# Patient Record
Sex: FEMALE | Race: WHITE | Employment: OTHER | URBAN - NONMETROPOLITAN AREA
[De-identification: names, ages, dates, MRNs, and addresses within clinical notes are randomized per-mention and may not be internally consistent; named-entity substitution may affect disease eponyms.]

---

## 2019-11-08 ENCOUNTER — HOSPITAL ENCOUNTER (OUTPATIENT)
Age: 79
Setting detail: SPECIMEN
Discharge: HOME | End: 2019-11-08
Payer: MEDICARE

## 2019-11-08 PROCEDURE — 87449 NOS EACH ORGANISM AG IA: CPT

## 2019-11-08 PROCEDURE — 87506 IADNA-DNA/RNA PROBE TQ 6-11: CPT

## 2019-11-08 PROCEDURE — 87324 CLOSTRIDIUM AG IA: CPT

## 2019-11-09 LAB
C DIFF AG + TOXIN: NEGATIVE
CAMPYLOBACTER PCR: NORMAL
E COLI ENTEROTOXIGENIC PCR: NORMAL
PLESIOMONAS SHIGELLOIDES PCR: NORMAL
SALMONELLA PCR: NORMAL
SHIGATOXIN GENE PCR: NORMAL
SHIGELLA SP PCR: NORMAL
SPECIMEN DESCRIPTION: NORMAL
SPECIMEN DESCRIPTION: NORMAL
VIBRIO PCR: NORMAL
YERSINIA ENTEROCOLITICA PCR: NORMAL

## 2019-11-12 ENCOUNTER — HOSPITAL ENCOUNTER (OUTPATIENT)
Age: 79
Setting detail: SPECIMEN
Discharge: HOME | End: 2019-11-12
Payer: MEDICARE

## 2019-11-12 LAB
ALBUMIN SERPL-MCNC: 3.3 G/DL (ref 3.5–5.2)
ALBUMIN/GLOBULIN RATIO: 0.8 (ref 1–2.5)
ALP BLD-CCNC: 109 U/L (ref 35–104)
ALT SERPL-CCNC: 26 U/L (ref 5–33)
ANION GAP SERPL CALCULATED.3IONS-SCNC: 14 MMOL/L (ref 9–17)
AST SERPL-CCNC: 13 U/L
BILIRUB SERPL-MCNC: 0.46 MG/DL (ref 0.3–1.2)
BUN BLDV-MCNC: 25 MG/DL (ref 8–23)
BUN/CREAT BLD: 36 (ref 9–20)
CALCIUM SERPL-MCNC: 9.6 MG/DL (ref 8.6–10.4)
CHLORIDE BLD-SCNC: 96 MMOL/L (ref 98–107)
CO2: 27 MMOL/L (ref 20–31)
CREAT SERPL-MCNC: 0.7 MG/DL (ref 0.5–0.9)
GFR AFRICAN AMERICAN: >60 ML/MIN
GFR NON-AFRICAN AMERICAN: >60 ML/MIN
GFR SERPL CREATININE-BSD FRML MDRD: ABNORMAL ML/MIN/{1.73_M2}
GFR SERPL CREATININE-BSD FRML MDRD: ABNORMAL ML/MIN/{1.73_M2}
GLUCOSE BLD-MCNC: 182 MG/DL (ref 70–99)
HCT VFR BLD CALC: 37.2 % (ref 36.3–47.1)
HEMOGLOBIN: 12 G/DL (ref 11.9–15.1)
MCH RBC QN AUTO: 29.1 PG (ref 25.2–33.5)
MCHC RBC AUTO-ENTMCNC: 32.3 G/DL (ref 28.4–34.8)
MCV RBC AUTO: 90.1 FL (ref 82.6–102.9)
NRBC AUTOMATED: 0 PER 100 WBC
PDW BLD-RTO: 13.2 % (ref 11.8–14.4)
PLATELET # BLD: 500 K/UL (ref 138–453)
PMV BLD AUTO: 10.3 FL (ref 8.1–13.5)
POTASSIUM SERPL-SCNC: 4.4 MMOL/L (ref 3.7–5.3)
RBC # BLD: 4.13 M/UL (ref 3.95–5.11)
SODIUM BLD-SCNC: 137 MMOL/L (ref 135–144)
TOTAL PROTEIN: 7.3 G/DL (ref 6.4–8.3)
URIC ACID: 5.5 MG/DL (ref 2.4–5.7)
WBC # BLD: 6.9 K/UL (ref 3.5–11.3)

## 2019-11-12 PROCEDURE — 80053 COMPREHEN METABOLIC PANEL: CPT

## 2019-11-12 PROCEDURE — 36415 COLL VENOUS BLD VENIPUNCTURE: CPT

## 2019-11-12 PROCEDURE — 85027 COMPLETE CBC AUTOMATED: CPT

## 2019-11-12 PROCEDURE — 84550 ASSAY OF BLOOD/URIC ACID: CPT

## 2019-11-12 PROCEDURE — P9604 ONE-WAY ALLOW PRORATED TRIP: HCPCS

## 2020-12-09 PROBLEM — M19.90 OSTEOARTHRITIS: Chronic | Status: ACTIVE | Noted: 2020-12-09

## 2020-12-09 PROBLEM — Z85.828 PERSONAL HISTORY OF OTHER MALIGNANT NEOPLASM OF SKIN: Status: ACTIVE | Noted: 2019-10-24

## 2020-12-09 PROBLEM — I10 ESSENTIAL HYPERTENSION: Status: ACTIVE | Noted: 2020-12-09

## 2020-12-09 PROBLEM — K52.9 CHRONIC DIARRHEA: Chronic | Status: ACTIVE | Noted: 2020-12-09

## 2021-07-12 ENCOUNTER — APPOINTMENT (OUTPATIENT)
Dept: CT IMAGING | Age: 81
DRG: 065 | End: 2021-07-12
Payer: MEDICARE

## 2021-07-12 ENCOUNTER — HOSPITAL ENCOUNTER (EMERGENCY)
Age: 81
Discharge: ANOTHER HOSPITAL | End: 2021-07-12
Attending: EMERGENCY MEDICINE | Admitting: EMERGENCY MEDICINE
Payer: MEDICARE

## 2021-07-12 ENCOUNTER — APPOINTMENT (OUTPATIENT)
Dept: GENERAL RADIOLOGY | Age: 81
End: 2021-07-12
Payer: MEDICARE

## 2021-07-12 ENCOUNTER — HOSPITAL ENCOUNTER (INPATIENT)
Age: 81
LOS: 14 days | Discharge: NURSING, REHAB OR LONG-TERM CARE | DRG: 065 | End: 2021-07-26
Attending: EMERGENCY MEDICINE | Admitting: PSYCHIATRY & NEUROLOGY
Payer: MEDICARE

## 2021-07-12 ENCOUNTER — APPOINTMENT (OUTPATIENT)
Dept: CT IMAGING | Age: 81
End: 2021-07-12
Payer: MEDICARE

## 2021-07-12 VITALS
DIASTOLIC BLOOD PRESSURE: 97 MMHG | HEART RATE: 88 BPM | RESPIRATION RATE: 21 BRPM | SYSTOLIC BLOOD PRESSURE: 183 MMHG | OXYGEN SATURATION: 90 %

## 2021-07-12 DIAGNOSIS — I62.9 INTRACRANIAL HEMORRHAGE (HCC): Primary | ICD-10-CM

## 2021-07-12 DIAGNOSIS — I16.9 HYPERTENSIVE CRISIS: ICD-10-CM

## 2021-07-12 DIAGNOSIS — I61.0 BASAL GANGLIA HEMORRHAGE (HCC): ICD-10-CM

## 2021-07-12 DIAGNOSIS — I61.9 INTRAPARENCHYMAL HEMORRHAGE OF BRAIN (HCC): Primary | ICD-10-CM

## 2021-07-12 PROBLEM — I63.9 ACUTE CEREBROVASCULAR ACCIDENT (CVA) (HCC): Status: ACTIVE | Noted: 2021-07-12

## 2021-07-12 LAB
ABSOLUTE EOS #: 0.11 K/UL (ref 0–0.44)
ABSOLUTE IMMATURE GRANULOCYTE: 0.03 K/UL (ref 0–0.3)
ABSOLUTE LYMPH #: 3.83 K/UL (ref 1.1–3.7)
ABSOLUTE MONO #: 0.69 K/UL (ref 0.1–1.2)
ALBUMIN SERPL-MCNC: 4.4 G/DL (ref 3.5–5.2)
ALBUMIN/GLOBULIN RATIO: 1.5 (ref 1–2.5)
ALP BLD-CCNC: 86 U/L (ref 35–104)
ALT SERPL-CCNC: 35 U/L (ref 5–33)
ANION GAP SERPL CALCULATED.3IONS-SCNC: 15 MMOL/L (ref 9–17)
AST SERPL-CCNC: 27 U/L
BASOPHILS # BLD: 1 % (ref 0–2)
BASOPHILS ABSOLUTE: 0.05 K/UL (ref 0–0.2)
BILIRUB SERPL-MCNC: 0.34 MG/DL (ref 0.3–1.2)
BUN BLDV-MCNC: 19 MG/DL (ref 8–23)
BUN/CREAT BLD: 36 (ref 9–20)
CALCIUM SERPL-MCNC: 9.5 MG/DL (ref 8.6–10.4)
CHLORIDE BLD-SCNC: 99 MMOL/L (ref 98–107)
CO2: 23 MMOL/L (ref 20–31)
CREAT SERPL-MCNC: 0.53 MG/DL (ref 0.5–0.9)
DIFFERENTIAL TYPE: ABNORMAL
EKG ATRIAL RATE: 84 BPM
EKG P AXIS: 57 DEGREES
EKG P-R INTERVAL: 200 MS
EKG Q-T INTERVAL: 420 MS
EKG QRS DURATION: 122 MS
EKG QTC CALCULATION (BAZETT): 496 MS
EKG R AXIS: -47 DEGREES
EKG T AXIS: 64 DEGREES
EKG VENTRICULAR RATE: 84 BPM
EOSINOPHILS RELATIVE PERCENT: 1 % (ref 1–4)
GFR AFRICAN AMERICAN: >60 ML/MIN
GFR NON-AFRICAN AMERICAN: >60 ML/MIN
GFR SERPL CREATININE-BSD FRML MDRD: ABNORMAL ML/MIN/{1.73_M2}
GFR SERPL CREATININE-BSD FRML MDRD: ABNORMAL ML/MIN/{1.73_M2}
GLUCOSE BLD-MCNC: 126 MG/DL (ref 70–99)
HCT VFR BLD CALC: 42.1 % (ref 36.3–47.1)
HEMOGLOBIN: 14.2 G/DL (ref 11.9–15.1)
IMMATURE GRANULOCYTES: 0 %
INR BLD: 1
INR BLD: 1
LYMPHOCYTES # BLD: 41 % (ref 24–43)
MAGNESIUM: 1.9 MG/DL (ref 1.6–2.6)
MCH RBC QN AUTO: 30.7 PG (ref 25.2–33.5)
MCHC RBC AUTO-ENTMCNC: 33.7 G/DL (ref 28.4–34.8)
MCV RBC AUTO: 91.1 FL (ref 82.6–102.9)
MONOCYTES # BLD: 7 % (ref 3–12)
NRBC AUTOMATED: 0 PER 100 WBC
PARTIAL THROMBOPLASTIN TIME: 21.2 SEC (ref 20.5–30.5)
PARTIAL THROMBOPLASTIN TIME: 24.3 SEC (ref 23.9–33.8)
PDW BLD-RTO: 13 % (ref 11.8–14.4)
PLATELET # BLD: 255 K/UL (ref 138–453)
PLATELET ESTIMATE: ABNORMAL
PMV BLD AUTO: 10.3 FL (ref 8.1–13.5)
POTASSIUM SERPL-SCNC: 3.3 MMOL/L (ref 3.7–5.3)
PROTHROMBIN TIME: 10.8 SEC (ref 9.1–12.3)
PROTHROMBIN TIME: 13 SEC (ref 11.5–14.2)
RBC # BLD: 4.62 M/UL (ref 3.95–5.11)
RBC # BLD: ABNORMAL 10*6/UL
SARS-COV-2, RAPID: NOT DETECTED
SEG NEUTROPHILS: 50 % (ref 36–65)
SEGMENTED NEUTROPHILS ABSOLUTE COUNT: 4.75 K/UL (ref 1.5–8.1)
SODIUM BLD-SCNC: 137 MMOL/L (ref 135–144)
SPECIMEN DESCRIPTION: NORMAL
TOTAL PROTEIN: 7.4 G/DL (ref 6.4–8.3)
TROPONIN INTERP: NORMAL
TROPONIN T: NORMAL NG/ML
TROPONIN, HIGH SENSITIVITY: 11 NG/L (ref 0–14)
TROPONIN, HIGH SENSITIVITY: 7 NG/L (ref 0–14)
TROPONIN, HIGH SENSITIVITY: 8 NG/L (ref 0–14)
WBC # BLD: 9.5 K/UL (ref 3.5–11.3)
WBC # BLD: ABNORMAL 10*3/UL

## 2021-07-12 PROCEDURE — 85730 THROMBOPLASTIN TIME PARTIAL: CPT

## 2021-07-12 PROCEDURE — 2500000003 HC RX 250 WO HCPCS: Performed by: STUDENT IN AN ORGANIZED HEALTH CARE EDUCATION/TRAINING PROGRAM

## 2021-07-12 PROCEDURE — 70496 CT ANGIOGRAPHY HEAD: CPT

## 2021-07-12 PROCEDURE — 93005 ELECTROCARDIOGRAM TRACING: CPT | Performed by: EMERGENCY MEDICINE

## 2021-07-12 PROCEDURE — 70450 CT HEAD/BRAIN W/O DYE: CPT

## 2021-07-12 PROCEDURE — 96375 TX/PRO/DX INJ NEW DRUG ADDON: CPT

## 2021-07-12 PROCEDURE — 6370000000 HC RX 637 (ALT 250 FOR IP): Performed by: PSYCHIATRY & NEUROLOGY

## 2021-07-12 PROCEDURE — 6360000004 HC RX CONTRAST MEDICATION: Performed by: EMERGENCY MEDICINE

## 2021-07-12 PROCEDURE — 85025 COMPLETE CBC W/AUTO DIFF WBC: CPT

## 2021-07-12 PROCEDURE — 93005 ELECTROCARDIOGRAM TRACING: CPT | Performed by: STUDENT IN AN ORGANIZED HEALTH CARE EDUCATION/TRAINING PROGRAM

## 2021-07-12 PROCEDURE — 84484 ASSAY OF TROPONIN QUANT: CPT

## 2021-07-12 PROCEDURE — 87635 SARS-COV-2 COVID-19 AMP PRB: CPT

## 2021-07-12 PROCEDURE — 2580000003 HC RX 258: Performed by: EMERGENCY MEDICINE

## 2021-07-12 PROCEDURE — 2000000003 HC NEURO ICU R&B

## 2021-07-12 PROCEDURE — 96374 THER/PROPH/DIAG INJ IV PUSH: CPT

## 2021-07-12 PROCEDURE — 2500000003 HC RX 250 WO HCPCS: Performed by: EMERGENCY MEDICINE

## 2021-07-12 PROCEDURE — 6360000002 HC RX W HCPCS: Performed by: STUDENT IN AN ORGANIZED HEALTH CARE EDUCATION/TRAINING PROGRAM

## 2021-07-12 PROCEDURE — 99283 EMERGENCY DEPT VISIT LOW MDM: CPT

## 2021-07-12 PROCEDURE — 83735 ASSAY OF MAGNESIUM: CPT

## 2021-07-12 PROCEDURE — 85610 PROTHROMBIN TIME: CPT

## 2021-07-12 PROCEDURE — 71045 X-RAY EXAM CHEST 1 VIEW: CPT

## 2021-07-12 PROCEDURE — 36415 COLL VENOUS BLD VENIPUNCTURE: CPT

## 2021-07-12 PROCEDURE — 51702 INSERT TEMP BLADDER CATH: CPT

## 2021-07-12 PROCEDURE — 93010 ELECTROCARDIOGRAM REPORT: CPT | Performed by: INTERNAL MEDICINE

## 2021-07-12 PROCEDURE — 6360000002 HC RX W HCPCS: Performed by: EMERGENCY MEDICINE

## 2021-07-12 PROCEDURE — 6360000002 HC RX W HCPCS: Performed by: PSYCHIATRY & NEUROLOGY

## 2021-07-12 PROCEDURE — 80053 COMPREHEN METABOLIC PANEL: CPT

## 2021-07-12 PROCEDURE — 99223 1ST HOSP IP/OBS HIGH 75: CPT | Performed by: PSYCHIATRY & NEUROLOGY

## 2021-07-12 PROCEDURE — 2580000003 HC RX 258: Performed by: PSYCHIATRY & NEUROLOGY

## 2021-07-12 RX ORDER — NICARDIPINE HYDROCHLORIDE 0.1 MG/ML
3-15 INJECTION INTRAVENOUS CONTINUOUS
Status: DISCONTINUED | OUTPATIENT
Start: 2021-07-12 | End: 2021-07-14

## 2021-07-12 RX ORDER — FENTANYL CITRATE 50 UG/ML
25 INJECTION, SOLUTION INTRAMUSCULAR; INTRAVENOUS
Status: DISCONTINUED | OUTPATIENT
Start: 2021-07-12 | End: 2021-07-13

## 2021-07-12 RX ORDER — SODIUM CHLORIDE 9 MG/ML
25 INJECTION, SOLUTION INTRAVENOUS PRN
Status: DISCONTINUED | OUTPATIENT
Start: 2021-07-12 | End: 2021-07-26 | Stop reason: HOSPADM

## 2021-07-12 RX ORDER — ATORVASTATIN CALCIUM 80 MG/1
80 TABLET, FILM COATED ORAL NIGHTLY
Status: DISCONTINUED | OUTPATIENT
Start: 2021-07-12 | End: 2021-07-13

## 2021-07-12 RX ORDER — ONDANSETRON 2 MG/ML
4 INJECTION INTRAMUSCULAR; INTRAVENOUS ONCE
Status: COMPLETED | OUTPATIENT
Start: 2021-07-12 | End: 2021-07-12

## 2021-07-12 RX ORDER — ONDANSETRON 4 MG/1
4 TABLET, ORALLY DISINTEGRATING ORAL EVERY 8 HOURS PRN
Status: DISCONTINUED | OUTPATIENT
Start: 2021-07-12 | End: 2021-07-26 | Stop reason: HOSPADM

## 2021-07-12 RX ORDER — FENTANYL CITRATE 50 UG/ML
50 INJECTION, SOLUTION INTRAMUSCULAR; INTRAVENOUS
Status: DISCONTINUED | OUTPATIENT
Start: 2021-07-12 | End: 2021-07-13

## 2021-07-12 RX ORDER — ONDANSETRON 2 MG/ML
4 INJECTION INTRAMUSCULAR; INTRAVENOUS EVERY 6 HOURS PRN
Status: DISCONTINUED | OUTPATIENT
Start: 2021-07-12 | End: 2021-07-26 | Stop reason: HOSPADM

## 2021-07-12 RX ORDER — SODIUM CHLORIDE 0.9 % (FLUSH) 0.9 %
5-40 SYRINGE (ML) INJECTION EVERY 12 HOURS SCHEDULED
Status: DISCONTINUED | OUTPATIENT
Start: 2021-07-12 | End: 2021-07-26 | Stop reason: HOSPADM

## 2021-07-12 RX ORDER — SODIUM CHLORIDE 0.9 % (FLUSH) 0.9 %
5-40 SYRINGE (ML) INJECTION PRN
Status: DISCONTINUED | OUTPATIENT
Start: 2021-07-12 | End: 2021-07-26 | Stop reason: HOSPADM

## 2021-07-12 RX ORDER — LEVETIRACETAM 10 MG/ML
1000 INJECTION INTRAVASCULAR ONCE
Status: COMPLETED | OUTPATIENT
Start: 2021-07-12 | End: 2021-07-12

## 2021-07-12 RX ORDER — LEVETIRACETAM 5 MG/ML
500 INJECTION INTRAVASCULAR EVERY 12 HOURS
Status: DISCONTINUED | OUTPATIENT
Start: 2021-07-13 | End: 2021-07-13

## 2021-07-12 RX ORDER — ACETAMINOPHEN 325 MG/1
650 TABLET ORAL EVERY 4 HOURS PRN
Status: DISCONTINUED | OUTPATIENT
Start: 2021-07-12 | End: 2021-07-26 | Stop reason: HOSPADM

## 2021-07-12 RX ADMIN — ONDANSETRON 4 MG: 2 INJECTION INTRAMUSCULAR; INTRAVENOUS at 17:58

## 2021-07-12 RX ADMIN — FENTANYL CITRATE 25 MCG: 50 INJECTION, SOLUTION INTRAMUSCULAR; INTRAVENOUS at 22:18

## 2021-07-12 RX ADMIN — LEVETIRACETAM 1000 MG: 10 INJECTION INTRAVENOUS at 21:56

## 2021-07-12 RX ADMIN — DEXTROSE MONOHYDRATE 3 MG/HR: 50 INJECTION, SOLUTION INTRAVENOUS at 18:00

## 2021-07-12 RX ADMIN — IOPAMIDOL 90 ML: 755 INJECTION, SOLUTION INTRAVENOUS at 19:44

## 2021-07-12 RX ADMIN — ATORVASTATIN CALCIUM 80 MG: 80 TABLET, FILM COATED ORAL at 21:56

## 2021-07-12 RX ADMIN — ONDANSETRON 4 MG: 2 INJECTION INTRAMUSCULAR; INTRAVENOUS at 21:56

## 2021-07-12 RX ADMIN — NICARDIPINE HYDROCHLORIDE 5 MG/HR: 0.1 INJECTION INTRAVENOUS at 20:06

## 2021-07-12 RX ADMIN — SODIUM CHLORIDE, PRESERVATIVE FREE 10 ML: 5 INJECTION INTRAVENOUS at 21:56

## 2021-07-12 ASSESSMENT — ENCOUNTER SYMPTOMS
ABDOMINAL PAIN: 0
NAUSEA: 0
COUGH: 0
BACK PAIN: 0
VOMITING: 0
SHORTNESS OF BREATH: 0
COLOR CHANGE: 0

## 2021-07-12 ASSESSMENT — PAIN SCALES - GENERAL
PAINLEVEL_OUTOF10: 8

## 2021-07-12 ASSESSMENT — PAIN DESCRIPTION - LOCATION: LOCATION: HEAD;NECK

## 2021-07-12 ASSESSMENT — PAIN DESCRIPTION - FREQUENCY: FREQUENCY: INTERMITTENT

## 2021-07-12 ASSESSMENT — PAIN DESCRIPTION - ORIENTATION: ORIENTATION: POSTERIOR

## 2021-07-12 ASSESSMENT — PAIN DESCRIPTION - PAIN TYPE: TYPE: ACUTE PAIN

## 2021-07-12 ASSESSMENT — PAIN DESCRIPTION - DESCRIPTORS: DESCRIPTORS: DISCOMFORT;THROBBING

## 2021-07-12 NOTE — ED PROVIDER NOTES
with complaint of headache. At 1530 was noted to have change in status with left-sided weakness. CT was performed at outlying facility which showed right basal ganglia bleed with ventricular extension. Patient was transferred here for further evaluation by stroke team.  Nikki Dietz started in route. Patient denies any blood thinner use except for baby aspirin. Physical:     blood pressure is 174/100 (abnormal) and her pulse is 92. Her respiration is 22 and oxygen saturation is 95%.    78 y.o. female no acute distress but appears uncomfortable. Diminished strength on the left side. Answering questions appropriately. Impression: Intracranial hemorrhage    Plan: CT head CTA head, stroke alert, admit to neuro ICU    EKG Interpretation    Interpreted by emergency department physician    Rhythm: normal sinus   Rate: normal  Axis: normal  Ectopy: Fusion complexes  Conduction: left anterior fasciclar block and left ventricular hypertrophy with nonspecific interventricular conduction delay  ST Segments: no acute change  T Waves: no acute change  Q Waves: none    EKG  Impression: Abnormal EKG, nonspecific    Virginia Doherty MD          CRITICAL CARE: There was a high probability of clinically significant/life threatening deterioration in this patient's condition which required my urgent intervention. Total critical care time was 31 minutes. This excludes any time for separately reportable procedures.      Rafiq Adler MD, Dilan Cook  Attending Emergency Physician        Virginia Doherty MD  07/12/21 1943       Virginia Doherty MD  07/12/21 2032

## 2021-07-12 NOTE — CODE DOCUMENTATION
Patient arrived to ED as a transfer from [facility] for facial droop and left sided weakness. Last known wellness is 1545. Patient shows slight Left sided droopiness and weakness on left side. Patient wears corrective lenses, but right eye opens more than left eye. Patient is on no blood thinners. Patient AOx3 and has C/O headache and nausea. Patient has history of CVA in 2014 and afib. Patient displays hypertension. Stroke alert called at [phone] [address]

## 2021-07-12 NOTE — ED NOTES
Bed: 02  Expected date:   Expected time:   Means of arrival:   Comments:  Stroke alert       Edris Severs, RN  07/12/21 8987

## 2021-07-12 NOTE — ED NOTES
To CT per cart with RN present, Dr Michael Stewart assessed pt at bedside      Carolina Aguiar, LEÓN  07/12/21 9615

## 2021-07-12 NOTE — ED PROVIDER NOTES
I did not see, evaluate, or staff this patient. Patient seen by Dr. Rogelio Medrano.        Aniceto Ledezma MD  07/12/21 Michael Garrison

## 2021-07-12 NOTE — ED NOTES
Pt had moderate sized yellow emesis on the way back from CT, Dr Muñoz Led notified     Jenna Oliveira, RN  07/12/21 3674

## 2021-07-12 NOTE — ED PROVIDER NOTES
[facility] ED  EMERGENCY DEPARTMENT ENCOUNTER      Pt Name: Duncan Wadsworth  MRN: [id number]  [place] 1940  Date of evaluation: 7/12/2021  Provider: Aparna Miller MD    CHIEF COMPLAINT       Chief Complaint   Patient presents with    Extremity Weakness     left sided, facial droop present, has not been taking BP meds for last several days    Nausea         HISTORY OF PRESENT ILLNESS   (Location/Symptom, Timing/Onset, Context/Setting, Quality, Duration, Modifying Factors, Severity)  Note limiting factors. Duncan Wadsworth is a 78 y.o. female who presents to the emergency department     78year-old patient presents emergency department with rather abrupt onset of weakness and inability to move her left arm left leg occurring 30 minutes prior to ED arrival at approximately 5 PM.  Patient also admits to mild headache. There was no loss conscious.-The patient does have previous history for CVA with previous history for left hemiparesis upper and lower extremities of which she is regained her function per EMS in attendance. Patient has a history for hypertension. Patient also admittedly has stopped taking her antihypertensive medications to 3 days prior to ED arrival does not really admit to any change in her visual acuity. Nursing Notes were reviewed. REVIEW OF SYSTEMS    (2-9 systems for level 4, 10 or more for level 5)     Review of Systems   Unable to perform ROS: Acuity of condition       Except as noted above the remainder of the review of systems was reviewed and negative.        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 ASPIRIN BUF,CACARB-MGCARB-MGO, (BUFFERIN PO)    Take 2 tablets by mouth 4 times daily as needed Per pt    ASPIRIN-ACETAMINOPHEN-CAFFEINE (EXCEDRIN MIGRAINE) 250-250-65 MG PER TABLET    Take 2 tablets by mouth daily as needed for Headaches    CALCIUM-MAGNESIUM-VITAMIN D PO        CHOLESTYRAMINE (QUESTRAN) 4 GM/DOSE POWDER    TAKE ONE SCOOP DISSOLVED IN A GLASS OF WATER TWICE A DAY AS NEEDED. FERROUS SULFATE 324 (65 FE) MG EC TABLET    Take 324 mg by mouth daily (with breakfast)    FLAXSEED, LINSEED, (FLAXSEED OIL PO)        LOSARTAN-HYDROCHLOROTHIAZIDE (HYZAAR) 100-25 MG PER TABLET    TAKE ONE TABLET DAILY. MAGNESIUM OXIDE 250 MG TABS    Take 1 tablet by mouth daily    MELATONIN 10 MG TABS    Take by mouth nightly as needed    NONFORMULARY    Indications: Natural supplement to help with elevated BP STRICTION BP - 2 TABLETS 2 TIMES A DAY    OMEGA-3 FATTY ACIDS (FISH OIL PO)        POTASSIUM GLUCONATE 595 (99 K) MG TABS    Take by mouth daily    UNABLE TO FIND    CBD Oil per pt report       ALLERGIES     Clonidine derivatives, Morphine, and Sulfa antibiotics    FAMILY HISTORY       Family History   Problem Relation Age of Onset    Diabetes Mother     Stroke Father     Heart Disease Father           SOCIAL HISTORY       Social History     Socioeconomic History    Marital status:       Spouse name: Not on file    Number of children: Not on file    Years of education: Not on file    Highest education level: Not on file   Occupational History    Not on file   Tobacco Use    Smoking status: Never Smoker    Smokeless tobacco: Never Used   Substance and Sexual Activity    Alcohol use: Yes    Drug use: Never    Sexual activity: Not Currently   Other Topics Concern    Not on file   Social History Narrative    Not on file     Social Determinants of Health     Financial Resource Strain:     Difficulty of Paying Living Expenses:    Food Insecurity:     Worried About Running Out of Food in the Last Year:     [address] in the Last Year:    Transportation Needs:     Lack of Transportation (Medical):  Lack of Transportation (Non-Medical):    Physical Activity:     Days of Exercise per Week:     Minutes of Exercise per Session:    Stress:     Feeling of Stress :    Social Connections:     Frequency of Communication with Friends and Family:     Frequency of Social Gatherings with Friends and Family:     Attends [employer] Services:     Active Member of Clubs or Organizations:     Attends Club or Organization Meetings:     Marital Status:    Intimate Partner Violence:     Fear of Current or Ex-Partner:     Emotionally Abused:     Physically Abused:     Sexually Abused:        SCREENINGS   NIH Stroke Scale  NIH Stroke Scale Assessed: Yes  Interval: Baseline  Level of Consciousness (1a. ): Not alert, requires repeated/painful stimulation or obtunded  LOC Questions (1b. ):  Answers both correctly  LOC Commands (1c. ): Performs both tasks correctly  Best Gaze (2. ): Normal  Visual (3. ): No visual loss  Facial Palsy (4. ): (!) Complete paralysis of one or both sides  Motor Arm, Left (5a. ): No movement  Motor Arm, Right (5b. ): No drift  Motor Leg, Left (6a. ): No movement  Motor Leg, Right (6b. ): No drift  Limb Ataxia (7. ): Absent  Sensory (8. ): Normal  Best Language (9. ): No aphasia  Dysarthria (10. ): Normal  Extinction and Inattention (11): No abnormality  Total: 12                    PHYSICAL EXAM    (up to 7 for level 4, 8 patient remains responsive to verbal stimuli during emergency department course-patient's blood pressure did improve after IV Cardene has been initiated and as documented per nursing    ED Course as of Jul 13 1300   Mon Jul 12, 2021   1801 Performed at 1759-the ventricular rate 84-sinus rhythm with premature ventricular complexes, CT interval 0.200-QRS duration 0.122-QTc corrected 0.496 nonspecific ST changes noted in 1 and aVL   EKG 12 Lead [RS]   Tue Jul 13, 2021   1255 Comprehensive Metabolic Panel w/ Reflex to MG(!):    Glucose 126(!)   BUN 19   Creatinine 0.53   Bun/Cre Ratio 36(!)   Calcium 9.5   Sodium 137   Potassium 3.3(!)   Chloride 99   CO2 23   Anion Gap 15   Alk Phos 86   ALT 35(!)   AST 27   Bilirubin 0.34   Total Protein 7.4   Albumin 4.4   Albumin/Globulin Ratio 1.5   GFR Non-African American >60   GFR  >60   GFR Comment        GFR Staging      [RS]   1255 CBC Auto Differential(!):    WBC 9.5   RBC 4.62   Hemoglobin Quant 14.2   Hematocrit 42.1   MCV 91.1   MCH 30.7   MCHC 33.7   RDW 13.0   Platelet Count 295   MPV 10.3   NRBC Automated 0.0   Differential Type NOT REPORTED   Seg Neutrophils 50   Lymphocytes 41   Monocytes 7   Eosinophils % 1   Basophils 1   Immature Granulocytes 0   Segs Absolute 4.75   Absolute Lymph # 3.83(!)   Absolute Mono # 0.69   Absolute Eos # 0.11   Basophils Absolute 0.05   Absolute Immature Granulocyte 0.03   WBC Morphology NOT REPORTED   . .. [RS]   1255 Magnesium:    Magnesium 1.9 [RS]   1255 Protime-INR:    Prothrombin Time 13.0   INR 1.0 [RS]   1255 Chest x-ray no acute process radiologist read   XR CHEST PORTABLE [RS]   0080 CT the patient's brain intraparenchymal\basal ganglion hemorrhage with ventricular involvement   CT Head WO Contrast [RS]      ED Course User Index  [RS] Sami Briones MD         CRITICAL CARE TIME   Total Critical Care time was 35 minutes, excluding separately reportable procedures.   There was a high probability of clinically significant/life threatening deterioration in the patient's condition which required my urgent intervention. CONSULTS:  Consultation obtained with emergency department physician Dr. Josette Nevarez. [facility] emergency department physician will except the patient in transfer    PROCEDURES:  Unless otherwise noted below, none     Procedures    FINAL IMPRESSION      1. Intraparenchymal hemorrhage of brain (HCC)    2. Basal ganglia hemorrhage ([facility] [address]    3. Hypertensive crisis          DISPOSITION/PLAN   DISPOSITION Decision To Transfer 07/12/2021 05:44:56 PM      PATIENT REFERRED TO:  No follow-up provider specified. DISCHARGE MEDICATIONS:  New Prescriptions    No medications on file     Controlled Substances Monitoring:     No flowsheet data found.     (Please note that portions of this note were completed with a voice recognition program.  Efforts were made to edit the dictations but occasionally words are mis-transcribed.)    Tracee Singh MD (electronically signed)  Attending Emergency Physician            Tracee Singh MD  07/13/21 1300

## 2021-07-12 NOTE — ED NOTES
Spoke with Patient's Son Lindsay Lundy.  He will leave and head towards [facility] and meet her up there     Nelsy Ramirez  07/12/21 5583

## 2021-07-13 ENCOUNTER — APPOINTMENT (OUTPATIENT)
Dept: CT IMAGING | Age: 81
DRG: 065 | End: 2021-07-13
Payer: MEDICARE

## 2021-07-13 ENCOUNTER — APPOINTMENT (OUTPATIENT)
Dept: GENERAL RADIOLOGY | Age: 81
DRG: 065 | End: 2021-07-13
Payer: MEDICARE

## 2021-07-13 PROBLEM — I61.9 THALAMIC HEMORRHAGE WITH STROKE (HCC): Status: ACTIVE | Noted: 2021-07-12

## 2021-07-13 LAB
ANION GAP SERPL CALCULATED.3IONS-SCNC: 14 MMOL/L (ref 9–17)
BUN BLDV-MCNC: 14 MG/DL (ref 8–23)
BUN/CREAT BLD: ABNORMAL (ref 9–20)
CALCIUM SERPL-MCNC: 8.8 MG/DL (ref 8.6–10.4)
CHLORIDE BLD-SCNC: 100 MMOL/L (ref 98–107)
CHOLESTEROL/HDL RATIO: 2.9
CHOLESTEROL: 144 MG/DL
CO2: 23 MMOL/L (ref 20–31)
CREAT SERPL-MCNC: 0.63 MG/DL (ref 0.5–0.9)
EKG ATRIAL RATE: 95 BPM
EKG P AXIS: 42 DEGREES
EKG P-R INTERVAL: 186 MS
EKG Q-T INTERVAL: 412 MS
EKG QRS DURATION: 124 MS
EKG QTC CALCULATION (BAZETT): 517 MS
EKG R AXIS: -50 DEGREES
EKG T AXIS: 85 DEGREES
EKG VENTRICULAR RATE: 95 BPM
ESTIMATED AVERAGE GLUCOSE: 134 MG/DL
GFR AFRICAN AMERICAN: >60 ML/MIN
GFR NON-AFRICAN AMERICAN: >60 ML/MIN
GFR SERPL CREATININE-BSD FRML MDRD: ABNORMAL ML/MIN/{1.73_M2}
GFR SERPL CREATININE-BSD FRML MDRD: ABNORMAL ML/MIN/{1.73_M2}
GLUCOSE BLD-MCNC: 146 MG/DL (ref 70–99)
HBA1C MFR BLD: 6.3 % (ref 4–6)
HCT VFR BLD CALC: 39.7 % (ref 36.3–47.1)
HDLC SERPL-MCNC: 49 MG/DL
HEMOGLOBIN: 12.9 G/DL (ref 11.9–15.1)
LDL CHOLESTEROL: 56 MG/DL (ref 0–130)
MCH RBC QN AUTO: 30 PG (ref 25.2–33.5)
MCHC RBC AUTO-ENTMCNC: 32.5 G/DL (ref 28.4–34.8)
MCV RBC AUTO: 92.3 FL (ref 82.6–102.9)
NRBC AUTOMATED: 0 PER 100 WBC
PDW BLD-RTO: 13.2 % (ref 11.8–14.4)
PLATELET # BLD: 253 K/UL (ref 138–453)
PMV BLD AUTO: 10.1 FL (ref 8.1–13.5)
POTASSIUM SERPL-SCNC: 3.8 MMOL/L (ref 3.7–5.3)
RBC # BLD: 4.3 M/UL (ref 3.95–5.11)
SODIUM BLD-SCNC: 137 MMOL/L (ref 135–144)
TRIGL SERPL-MCNC: 194 MG/DL
VLDLC SERPL CALC-MCNC: ABNORMAL MG/DL (ref 1–30)
WBC # BLD: 9.6 K/UL (ref 3.5–11.3)

## 2021-07-13 PROCEDURE — 2000000003 HC NEURO ICU R&B

## 2021-07-13 PROCEDURE — 99292 CRITICAL CARE ADDL 30 MIN: CPT | Performed by: PSYCHIATRY & NEUROLOGY

## 2021-07-13 PROCEDURE — 6360000002 HC RX W HCPCS: Performed by: PSYCHIATRY & NEUROLOGY

## 2021-07-13 PROCEDURE — 80048 BASIC METABOLIC PNL TOTAL CA: CPT

## 2021-07-13 PROCEDURE — 6370000000 HC RX 637 (ALT 250 FOR IP): Performed by: NURSE PRACTITIONER

## 2021-07-13 PROCEDURE — 2580000003 HC RX 258: Performed by: PSYCHIATRY & NEUROLOGY

## 2021-07-13 PROCEDURE — 2500000003 HC RX 250 WO HCPCS: Performed by: STUDENT IN AN ORGANIZED HEALTH CARE EDUCATION/TRAINING PROGRAM

## 2021-07-13 PROCEDURE — 70450 CT HEAD/BRAIN W/O DYE: CPT

## 2021-07-13 PROCEDURE — 97163 PT EVAL HIGH COMPLEX 45 MIN: CPT

## 2021-07-13 PROCEDURE — 36415 COLL VENOUS BLD VENIPUNCTURE: CPT

## 2021-07-13 PROCEDURE — 92523 SPEECH SOUND LANG COMPREHEN: CPT

## 2021-07-13 PROCEDURE — 6370000000 HC RX 637 (ALT 250 FOR IP): Performed by: STUDENT IN AN ORGANIZED HEALTH CARE EDUCATION/TRAINING PROGRAM

## 2021-07-13 PROCEDURE — APPNB15 APP NON BILLABLE TIME 0-15 MINS: Performed by: NURSE PRACTITIONER

## 2021-07-13 PROCEDURE — 80061 LIPID PANEL: CPT

## 2021-07-13 PROCEDURE — 6360000002 HC RX W HCPCS: Performed by: STUDENT IN AN ORGANIZED HEALTH CARE EDUCATION/TRAINING PROGRAM

## 2021-07-13 PROCEDURE — 71045 X-RAY EXAM CHEST 1 VIEW: CPT

## 2021-07-13 PROCEDURE — 97530 THERAPEUTIC ACTIVITIES: CPT

## 2021-07-13 PROCEDURE — 6370000000 HC RX 637 (ALT 250 FOR IP): Performed by: PSYCHIATRY & NEUROLOGY

## 2021-07-13 PROCEDURE — 85027 COMPLETE CBC AUTOMATED: CPT

## 2021-07-13 PROCEDURE — 99291 CRITICAL CARE FIRST HOUR: CPT | Performed by: PSYCHIATRY & NEUROLOGY

## 2021-07-13 PROCEDURE — 92610 EVALUATE SWALLOWING FUNCTION: CPT

## 2021-07-13 PROCEDURE — 83036 HEMOGLOBIN GLYCOSYLATED A1C: CPT

## 2021-07-13 RX ORDER — LABETALOL HYDROCHLORIDE 5 MG/ML
10 INJECTION, SOLUTION INTRAVENOUS
Status: DISCONTINUED | OUTPATIENT
Start: 2021-07-13 | End: 2021-07-26 | Stop reason: HOSPADM

## 2021-07-13 RX ORDER — DIPHENHYDRAMINE HCL 25 MG
25 TABLET ORAL ONCE
Status: COMPLETED | OUTPATIENT
Start: 2021-07-13 | End: 2021-07-13

## 2021-07-13 RX ORDER — LOSARTAN POTASSIUM AND HYDROCHLOROTHIAZIDE 25; 100 MG/1; MG/1
1 TABLET ORAL DAILY
Status: DISCONTINUED | OUTPATIENT
Start: 2021-07-13 | End: 2021-07-13

## 2021-07-13 RX ORDER — HYDROCHLOROTHIAZIDE 25 MG/1
25 TABLET ORAL DAILY
Status: DISCONTINUED | OUTPATIENT
Start: 2021-07-13 | End: 2021-07-23

## 2021-07-13 RX ORDER — ONDANSETRON 2 MG/ML
4 INJECTION INTRAMUSCULAR; INTRAVENOUS ONCE
Status: COMPLETED | OUTPATIENT
Start: 2021-07-14 | End: 2021-07-14

## 2021-07-13 RX ORDER — AMLODIPINE BESYLATE 5 MG/1
5 TABLET ORAL DAILY
Status: DISCONTINUED | OUTPATIENT
Start: 2021-07-13 | End: 2021-07-14

## 2021-07-13 RX ORDER — FENTANYL CITRATE 50 UG/ML
50 INJECTION, SOLUTION INTRAMUSCULAR; INTRAVENOUS ONCE
Status: COMPLETED | OUTPATIENT
Start: 2021-07-14 | End: 2021-07-14

## 2021-07-13 RX ORDER — LOSARTAN POTASSIUM 50 MG/1
100 TABLET ORAL DAILY
Status: DISCONTINUED | OUTPATIENT
Start: 2021-07-13 | End: 2021-07-26 | Stop reason: HOSPADM

## 2021-07-13 RX ORDER — LEVETIRACETAM 500 MG/1
500 TABLET ORAL 2 TIMES DAILY
Status: DISCONTINUED | OUTPATIENT
Start: 2021-07-13 | End: 2021-07-14

## 2021-07-13 RX ORDER — DIPHENHYDRAMINE HCL 25 MG
25 TABLET ORAL ONCE
Status: COMPLETED | OUTPATIENT
Start: 2021-07-14 | End: 2021-07-14

## 2021-07-13 RX ADMIN — HYDROCHLOROTHIAZIDE 25 MG: 25 TABLET ORAL at 11:01

## 2021-07-13 RX ADMIN — FENTANYL CITRATE 25 MCG: 50 INJECTION, SOLUTION INTRAMUSCULAR; INTRAVENOUS at 12:18

## 2021-07-13 RX ADMIN — NICARDIPINE HYDROCHLORIDE 6 MG/HR: 0.1 INJECTION INTRAVENOUS at 20:51

## 2021-07-13 RX ADMIN — FENTANYL CITRATE 25 MCG: 50 INJECTION, SOLUTION INTRAMUSCULAR; INTRAVENOUS at 08:16

## 2021-07-13 RX ADMIN — ONDANSETRON 4 MG: 4 TABLET, ORALLY DISINTEGRATING ORAL at 21:32

## 2021-07-13 RX ADMIN — SODIUM CHLORIDE, PRESERVATIVE FREE 10 ML: 5 INJECTION INTRAVENOUS at 21:21

## 2021-07-13 RX ADMIN — SODIUM CHLORIDE, PRESERVATIVE FREE 10 ML: 5 INJECTION INTRAVENOUS at 08:01

## 2021-07-13 RX ADMIN — LEVETIRACETAM 500 MG: 500 TABLET, FILM COATED ORAL at 11:01

## 2021-07-13 RX ADMIN — ACETAMINOPHEN 650 MG: 325 TABLET ORAL at 19:31

## 2021-07-13 RX ADMIN — ONDANSETRON 4 MG: 2 INJECTION INTRAMUSCULAR; INTRAVENOUS at 05:38

## 2021-07-13 RX ADMIN — ONDANSETRON 4 MG: 2 INJECTION INTRAMUSCULAR; INTRAVENOUS at 14:03

## 2021-07-13 RX ADMIN — ACETAMINOPHEN 650 MG: 325 TABLET ORAL at 08:16

## 2021-07-13 RX ADMIN — FENTANYL CITRATE 50 MCG: 50 INJECTION, SOLUTION INTRAMUSCULAR; INTRAVENOUS at 04:06

## 2021-07-13 RX ADMIN — Medication 10 MG: at 08:16

## 2021-07-13 RX ADMIN — LOSARTAN POTASSIUM 100 MG: 50 TABLET, FILM COATED ORAL at 11:01

## 2021-07-13 RX ADMIN — NICARDIPINE HYDROCHLORIDE 3 MG/HR: 0.1 INJECTION INTRAVENOUS at 12:33

## 2021-07-13 RX ADMIN — AMLODIPINE BESYLATE 5 MG: 5 TABLET ORAL at 11:01

## 2021-07-13 RX ADMIN — DIPHENHYDRAMINE HCL 25 MG: 25 TABLET ORAL at 05:38

## 2021-07-13 RX ADMIN — LEVETIRACETAM 500 MG: 500 TABLET, FILM COATED ORAL at 19:30

## 2021-07-13 ASSESSMENT — PAIN DESCRIPTION - LOCATION
LOCATION: HEAD

## 2021-07-13 ASSESSMENT — PAIN DESCRIPTION - DESCRIPTORS
DESCRIPTORS: HEADACHE

## 2021-07-13 ASSESSMENT — PAIN SCALES - GENERAL
PAINLEVEL_OUTOF10: 3
PAINLEVEL_OUTOF10: 8
PAINLEVEL_OUTOF10: 5
PAINLEVEL_OUTOF10: 8
PAINLEVEL_OUTOF10: 6
PAINLEVEL_OUTOF10: 8
PAINLEVEL_OUTOF10: 6

## 2021-07-13 ASSESSMENT — PAIN DESCRIPTION - PAIN TYPE: TYPE: ACUTE PAIN

## 2021-07-13 NOTE — PROGRESS NOTES
sleep apnea, Osteoarthritis, Pain in right foot, Spinal stenosis, Surgical aftercare, neoplasm, and Vitamin D deficiency. has a past surgical history that includes Appendectomy; Cholecystectomy; hernia repair; Tonsillectomy; Tubal ligation; and Hysterectomy.     Restrictions  Restrictions/Precautions  Restrictions/Precautions: General Precautions, Up as Tolerated, Seizure  Required Braces or Orthoses?: No  Vision/Hearing  Vision: Impaired  Vision Exceptions: Wears glasses at all times  Hearing: Exceptions to [facility]  Hearing Exceptions: Bilateral hearing aid;Hard of hearing/hearing concerns     Subjective  BP: 145/77 lying in bed; 186/87 sitting EOB ~4 min; 151/55 lying in bed upon exit  General  Patient assessed for rehabilitation services?: Yes  Response To Previous Treatment: Not applicable  Family / Caregiver Present: Yes (daughter)  Follows Commands: Within Functional Limits  Pain Screening  Patient Currently in Pain: Yes  Pain Assessment  Pain Assessment: 0-10  Pain Level: 6  Pain Type: Acute pain  Pain Location: Head  Pain Descriptors: Headache  Vital Signs  Patient Currently in Pain: Yes  Pre Treatment Pain Screening  Intervention List: Patient able to continue with treatment    Orientation  Orientation  Overall Orientation Status: Within Functional Limits  Social/Functional History  Social/Functional History  Lives With: Alone  Type of Home: Apartment  Home Layout: One level  Home Access: Stairs to enter with rails  Entrance Stairs - Number of Steps: 3-4  Entrance Stairs - Rails:  (daughter unsure of which side rail is on)  Home Equipment: Rolling walker, Cane  Ambulation Assistance: Independent  Transfer Assistance: Independent  Active : Yes  Occupation: Retired  Additional Comments: information obtained from daughter at bedside    Objective     Observation/Palpation  Posture: Poor    PROM RLE (degrees)  RLE PROM: WFL  PROM LLE (degrees)  LLE PROM: WFL  PROM RUE (degrees)  RUE PROM: WFL  PROM LUE (degrees)  LUE PROM: [facility]  Strength RLE  Strength RLE: Exception (hip distal at least 3/5- pt moved against gravity)  Strength LLE  Strength LLE: Exception (hip distal 0/5; pt tried to move but unable to; no palpable muscle contraction)  Strength RUE  Strength RUE: WFL  Strength LUE  Strength LUE: Exception (shoulder distal 0/5; pt tried to move; no palpable muscle contraction)  Tone RLE  RLE Tone: Normotonic  Tone LLE  LLE Tone: Hypertonic  Motor Control  Gross Motor?: Exceptions (pt unable to find LUE w/ RUE w/o assistance)  Sensation  Overall Sensation Status: Impaired (Pt has no sensation to LUE/LLE)  Bed mobility  Rolling to Left: Moderate assistance  Rolling to Right: Maximum assistance  Supine to Sit: Maximum assistance (trunk and BLE progression)  Sit to Supine: Maximum assistance  Scooting: Maximal assistance  Transfers  Comment: Not appropriate this date d/t nausea and elevated BP  Ambulation  Ambulation?: No  Stairs/Curb  Stairs?: No     Balance  Posture: Poor (pt leaning posterior and to the L; able to correct at times but reverts back to previous posture)  Sitting - Static: Poor  Sitting - Dynamic: Poor  Exercises  Comments: PROM LUE/LE x5 flexion/extension     Plan   Plan  Times per week: 5-6 visits weekly  Times per day: Daily  Current Treatment Recommendations: Strengthening, ROM, Balance Training, Functional Mobility Training, Stair training, Gait Training, Endurance Training, Transfer Training, Safety Education & Training  Safety Devices  Type of devices: Nurse notified, Left in bed, Patient at risk for falls, Call light within reach  Restraints  Initially in place: No      AM-PAC Score  AM-PAC Inpatient Mobility Raw Score : 9 (07/13/21 1454)  AM-PAC Inpatient T-Scale Score : 30.55 (07/13/21 1454)  Mobility Inpatient CMS 0-100% Score: 81.38 (07/13/21 1454)  Mobility Inpatient CMS G-Code Modifier : CM (07/13/21 1454)          Goals  Short term goals  Time Frame for Short term goals: 12 visits  Short term goal 1: Perform bed mob IND  Short term goal 2: Assess and set goals for transfers, ambulation, and stair negotiation as appropriate  Short term goal 3: Prevent contractures  Short term goal 4: Facilitate active movement  Patient Goals   Patient goals : Go home       Therapy Time   Individual Concurrent Group Co-treatment   Time In 1332         Time Out 1409         Minutes 37         Timed Code Treatment Minutes: 15 Minutes       JESSICA Sepulveda    This treatment/evaluation completed by signing SPT. Signing PT agrees with treatment and documentation.   Mark Cabezas, PT

## 2021-07-13 NOTE — CARE COORDINATION
PHQ 9  Pt present with headache, left-sided weakness (stroke)  Met with pt to assess for support and needs. Dtr at bedside and consent given. Pt states she has been depressed a little. No suicidal ideations. Pt states no change in her appetite. No problems sleeping. Tired at times. Pt states no problem with her concentration, however has noticed a change in her vision. Patient Health Questionnaire-9 (PHQ-9)    Over the last 2 weeks, how often have you been bothered by any of the following problems? 1. Little Interest or pleasure in doing things? [x] Not at all  [] Several Days  [] More than half the day  []  Nearly every day    2. Feeling down, depressed or hopeless? [] Not at all  [x] Several Days  [] More than half the day  []  Nearly every day    3. Trouble falling or staying asleep, or sleeping too much? [x] Not at all  [] Several Days  [] More than half the day  []  Nearly every day    4. Feeling tired or having little energy? [] Not at all  [x] Several Days  [] More than half the day  []  Nearly every day    5. Poor apettite or overeating? [x] Not at all  [] Several Days  [] More than half the day  []  Nearly every day    6. Feeling bad about yourself-or that you are a failure or have let yourself or your family down? [x] Not at all  [] Several Days  [] More than half the day  []  Nearly every day    7. Trouble concentrating on things, such as reading the newspaper or watching television? [x] Not at all  [] Several Days  [] More than half the day  []  Nearly every day    8. Moving or speaking so slowly that other people could have noticed? Or the opposite-being so fidgety or restless that you have been moving around a lot more than usual?   [x] Not at all  [] Several Days  [] More than half the day  []  Nearly every day    9. Thoughts that you would be better off dead or of hurting yourself in some way?    [x] Not at all  [] Several Days  [] More than half the day  []  Nearly every day    Total Score:2  If you checked off any problems, how difficult have these problems made it for you to do your work, take care of things at home, or get along with other people?    [x] Not difficult at all  [] Somewhat Difficult  [] Very Difficult  []  Extremely Difficult

## 2021-07-13 NOTE — ED PROVIDER NOTES
[facility] ED  Emergency Department Encounter  Emergency Medicine Resident     Pt Name: Isai Ayala  MRN: [id number]  [place] 1940  Date of evaluation: 7/12/21  PCP:  CHRISTIANNE Quinonez CNP    CHIEF COMPLAINT       No chief complaint on file. HISTORY OFPRESENT ILLNESS  (Location/Symptom, Timing/Onset, Context/Setting, Quality, Duration, Modifying Factors,Severity.)      Isai Ayala is a 78 y.o. female with past medical history hypertension, hyperlipidemia, CVA who presents as a transfer from [facility] due to intracranial hemorrhage. Patient initially presented to to the emergency department with complaints of headache, inability to move her left arm and inability to move her left leg. Last known well approximately 1545 today. Patient states she was getting ready to get up and use the bathroom however was unable to move her left leg and felt like her left upper and left lower extremities were \"asleep \". Per chart review patient does have history of CVA with previous hemiparesis of her left upper and lower extremities however she did regain function. Blood pressure found to be 476 systolic at [facility] emergency department, patient was started on Cardene and transferred to [facility]. Patient arrives alert and oriented to person, place, time. She does endorse left-sided weakness in her left arm and left lower extremity. 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      has a past surgical history that includes Appendectomy; Cholecystectomy; hernia repair; Tonsillectomy; Tubal ligation; and Hysterectomy. Social History     Socioeconomic History    Marital status:      Spouse name: Not on file    Number of children: Not on file    Years of education: Not on file    Highest education level: Not on file   Occupational History    Not on file   Tobacco Use    Smoking status: Never Smoker    Smokeless tobacco: Never Used   Substance and Sexual Activity    Alcohol use: Yes    Drug use: Never    Sexual activity: Not Currently   Other Topics Concern    Not on file   Social History Narrative    Not on file     Social Determinants of Health     Financial Resource Strain:     Difficulty of Paying Living Expenses:    Food Insecurity:     Worried About Running Out of Food in the Last Year:     [address] in the Last Year:    Transportation Needs:     Lack of Transportation (Medical):  Lack of Transportation (Non-Medical):    Physical Activity:     Days of Exercise per Week:     Minutes of Exercise per Session:    Stress:     Feeling of Stress :    Social Connections:     Frequency of Communication with Friends and Family:     Frequency of Social Gatherings with Friends and Family:     Attends Restoration Services:     Active Member of Clubs or Organizations:     Attends Club or Organization Meetings:     Marital Status:    Intimate Partner Violence:     Fear of Current or Ex-Partner:     Emotionally Abused:     Physically Abused:     Sexually Abused:        Family History   Problem Relation Age of Onset    Diabetes Mother     Stroke Father     Heart Disease Father        Allergies:  Clonidine derivatives, Morphine, and Sulfa antibiotics    Home Medications:  Prior to Admission medications    Medication Sig Start Date End Date Taking?  Authorizing Provider   cholestyramine (QUESTRAN) 4 GM/DOSE powder TAKE ONE SCOOP DISSOLVED IN A GLASS OF WATER TWICE A DAY AS NEEDED. 5/17/21   CHRISTIANNE Meenses CNP   NONFORMULARY Indications: Natural supplement to help with elevated BP STRICTION BP - 2 TABLETS 2 TIMES A DAY    Historical Provider, MD   Aspirin Buf,CaCarb-MgCarb-MgO, (BUFFERIN PO) Take 2 tablets by mouth 4 times daily as needed Per pt    Historical Provider, MD   Melatonin 10 MG TABS Take by mouth nightly as needed    Historical Provider, MD   Omega-3 Fatty Acids (FISH OIL PO)     Historical Provider, MD   losartan-hydroCHLOROthiazide (HYZAAR) 100-25 MG per tablet TAKE ONE TABLET DAILY. 2/12/21   CHRISTIANNE Meneses CNP   amLODIPine (NORVASC) 5 MG tablet TAKE ONE TABLET DAILY. 2/12/21   CHRISTIANNE Meneses CNP   UNABLE TO FIND CBD Oil per pt report    Historical Provider, MD   CALCIUM-MAGNESIUM-VITAMIN D PO     Historical Provider, MD   Potassium Gluconate 595 (99 K) MG TABS Take by mouth daily    Historical Provider, MD   Flaxed Linseed, (FLAXSEED OIL PO)     Historical Provider, MD   aspirin-acetaminophen-caffeine (Judieth Lackey) [phone] MG per tablet Take 2 tablets by mouth daily as needed for Headaches    Historical Provider, MD   Magnesium Oxide 250 MG TABS Take 1 tablet by mouth daily    Historical Provider, MD   ferrous sulfate 324 (65 Fe) MG EC tablet Take 324 mg by mouth daily (with breakfast)    Historical Provider, MD       REVIEW OF SYSTEMS    (2-9 systems for level 4, 10 or more for level 5)      Review of Systems   Constitutional: Negative for chills and fever. HENT: Negative for congestion. Eyes: Negative for visual disturbance. Respiratory: Negative for cough and shortness of breath. Gastrointestinal: Negative for abdominal pain, nausea and vomiting. Musculoskeletal: Negative for back pain. Skin: Negative for color change and rash. Neurological: Positive for weakness, numbness and headaches. Psychiatric/Behavioral: Negative for confusion.        PHYSICAL EXAM   (up to 7 for level 4, 8 or more for level 5)     INITIAL VITALS:    axillary temperature is 98.4 °F (36.9 °C). Her blood pressure is 146/80 (abnormal) and her pulse is 98. Her respiration is 18 and oxygen saturation is 96%. Physical Exam  Constitutional:       General: She is not in acute distress. Appearance: Normal appearance. HENT:      Head: Normocephalic and atraumatic. Eyes:      Extraocular Movements: Extraocular movements intact. Comments: Pupils 3 mm bilaterally and equally reactive to light   Cardiovascular:      Rate and Rhythm: Normal rate and regular rhythm. Heart sounds: No murmur heard. No gallop. Pulmonary:      Effort: Pulmonary effort is normal. No respiratory distress. Breath sounds: Normal breath sounds. No wheezing. Abdominal:      General: Abdomen is flat. Palpations: Abdomen is soft. Tenderness: There is no abdominal tenderness. Musculoskeletal:         General: No tenderness or deformity. Skin:     General: Skin is warm and dry. Capillary Refill: Capillary refill takes less than 2 seconds. Findings: No rash. Neurological:      Mental Status: She is alert. Comments: Alert and oriented to person, place, time. Pupils are equal and reactive bilaterally, extraocular motions intact and patient is able to cross midline with extraocular motion. She has no facial sensory deficit however there is left-sided facial droop noted upon exam.  Patient has good range of motion her tongue, symmetrical palate elevation. Patient has 0/5 strength left upper and left lower extremities, 5/5 strength right upper and right lower extremities, she does not have any sensory deficits and sensation is intact in her bilateral upper and lower extremities.          DIFFERENTIAL  DIAGNOSIS     PLAN (LABS / IMAGING / EKG):  Orders Placed This Encounter   Procedures    COVID-19, Rapid    CT HEAD WO CONTRAST    CTA HEAD W CONTRAST    CT head without contrast    Basic metabolic panel    CBC    Hemoglobin A1c    hemorrhage. Systolic blood pressure initially in the 210s. Patient started on Cardene and transferred to [facility]. Patient seen and examined, she is in no acute distress. Patient has left-sided facial droop, left upper and left lower extremity weakness. No sensory deficits. Blood pressure 170s on 1.75 mg of Cardene/hour. Neuro critical care consult obtained. Will perform repeat CT of the head, CTA of head. Will optimize blood pressure control with Cardene, plan to increase to 5 mg/hour. Plan for admission. INITIAL NIH STROKE SCALE    Time Performed:  1930 PM     1a. Level of consciousness:  0 - alert; keenly responsive  1b. Level of consciousness questions:  0 - answers both questions correctly  1c. Level of consciousness questions:  0 - performs both tasks correctly  2. Best Gaze:  0 - normal  3. Visual:  0 - no visual loss  4. Facial Palsy:  2 - partial paralysis (total or near total paralysis of the lower face)  5a. Motor left arm:  4 - no movement  5b. Motor right arm:  0 - no drift, limb holds 90 (or 45) degrees for full 10 seconds  6a. Motor left le - no movement  6b. Motor right le - no drift; leg holds 30 degree position for full 5 seconds  7. Limb Ataxia:  1 - present in one limb  8. Sensory:  0 - normal; no sensory loss  9. Best Language:  0 - no aphasia, normal  10. Dysarthria:  0 - normal  11.   Extinction and Inattention:  0 - no abnormality    TOTAL:  11          DIAGNOSTIC RESULTS / EMERGENCY DEPARTMENT COURSE / MDM     LABS:  Labs Reviewed   COVID-19, RAPID   TROPONIN   TROPONIN   APTT   PROTIME-INR   BASIC METABOLIC PANEL   CBC   HEMOGLOBIN A1C   LIPID PANEL         RADIOLOGY:  CT HEAD WO CONTRAST    Result Date: 2021  EXAMINATION: CT OF THE HEAD WITHOUT CONTRAST; CTA OF THE HEAD WITH CONTRAST 2021 7:35 pm; 2021 7:36 pm TECHNIQUE: CT of the head was performed without the administration of intravenous contrast. Dose modulation, iterative reconstruction, and/or weight based adjustment of the mA/kV was utilized to reduce the radiation dose to as low as reasonably achievable.; CTA of the head/brain was performed with the administration of intravenous contrast. Multiplanar reformatted images are provided for review. MIP images are provided for review. Dose modulation, iterative reconstruction, and/or weight based adjustment of the mA/kV was utilized to reduce the radiation dose to as low as reasonably achievable. COMPARISON: July 12, 2021 at 1735 hours HISTORY: ORDERING SYSTEM PROVIDED HISTORY: known basal ganglia bleed, transferred here, eval for progression TECHNOLOGIST PROVIDED HISTORY: known basal ganglia bleed, transferred here, eval for progression Decision Support Exception - unselect if not a suspected or confirmed emergency medical condition->Emergency Medical Condition (MA) Reason for Exam: stroke,known bleed Acuity: Unknown Type of Exam: Unknown; ORDERING SYSTEM PROVIDED HISTORY: basal ganglia bleed, eval for vascular malformation TECHNOLOGIST PROVIDED HISTORY: basal ganglia bleed, eval for vascular malformation Decision Support Exception - unselect if not a suspected or confirmed emergency medical condition->Emergency Medical Condition (MA) Reason for Exam: stroke,known bleed Acuity: Unknown Type of Exam: Unknown FINDINGS: : CT HEAD: BRAIN/VENTRICLES: Stable appearance to right basal ganglia hematoma unchanged in size again measured at 2.6 AP by 2.2 cm transverse by 2.0 cm craniocaudal. ORBITS: The visualized portion of the orbits demonstrate no acute abnormality. SINUSES:  The visualized paranasal sinuses and mastoid air cells demonstrate no acute abnormality. SOFT TISSUES/SKULL: No acute abnormality of the visualized skull or soft tissues. CTA HEAD: ANTERIOR CIRCULATION: No significant stenosis of the intracranial internal carotid, anterior cerebral, or middle cerebral arteries. No aneurysm.  POSTERIOR CIRCULATION: No significant stenosis of the vertebral, basilar, or posterior cerebral arteries. No aneurysm. OTHER: No dural venous sinus thrombosis on this non-dedicated study. 1. Stable appearance to right basal ganglia hemorrhage with intraventricular hemorrhage most likely representing rupture of the basal ganglia hemorrhage into the ventricular system with asymmetric amount of blood in the right lateral ventricle compared to the left. Blood fills the 3rd ventricle and the sylvian aqueduct is also blood in the 4th ventricle. 2. Unremarkable CTA of the head. Findings were discussed with Dr. Eugenie Daniel at 7:58 pm on 7/12/2021. CT Head WO Contrast    Result Date: 7/12/2021  EXAMINATION: CT OF THE HEAD WITHOUT CONTRAST  7/12/2021 5:36 pm TECHNIQUE: CT of the head was performed without the administration of intravenous contrast. Dose modulation, iterative reconstruction, and/or weight based adjustment of the mA/kV was utilized to reduce the radiation dose to as low as reasonably achievable. COMPARISON: None. HISTORY: ORDERING SYSTEM PROVIDED HISTORY: Left hemiplegia TECHNOLOGIST PROVIDED HISTORY: Left hemiplegia Decision Support Exception - unselect if not a suspected or confirmed emergency medical condition->Emergency Medical Condition (MA) FINDINGS: BRAIN/VENTRICLES: Right basal ganglia hemorrhage 2.6 cm AP x 2.2 cm transverse by 2.0 cm craniocaudal with hematoma volume estimated 5.7 mL. There is hemorrhage twitches in the ventricular system most likely secondary to basal ganglia hemorrhage rupture into the ventricular system is blood predominantly in the right lateral ventricle 3rd ventricle aqueduct with blood also noted in the left lateral ventricle to a smaller extent there is also blood noted in 4th ventricle. There is no midline shift at this time.   There is diffuse volume loss with extensive white matter changes noted bilaterally old infarct in the left periventricular white matter No blood is noted in the basal cisterns or the sylvian fissure or middle cerebral artery cisterns. ORBITS: The visualized portion of the orbits demonstrate no acute abnormality. SINUSES: The visualized paranasal sinuses and mastoid air cells demonstrate no acute abnormality. SOFT TISSUES/SKULL:  No acute abnormality of the visualized skull or soft tissues. A 2.6 x 2.2 x 2.0 cm intraparenchymal hematoma with intraventricular hemorrhage most likely representing rupture the basal ganglia hemorrhage into the right lateral ventricle. There is some small amount of blood noted in the left lateral ventricle as well as blood filling the 3rd ventricle in the aqueduct and in the 4th ventricle. No midline shift. Diffuse volume loss is evident. Findings were discussed with Abi Rm at 5:45 pm on 7/12/2021. XR CHEST PORTABLE    Result Date: 7/12/2021  EXAMINATION: ONE XRAY VIEW OF THE CHEST 7/12/2021 2:56 pm COMPARISON: None. HISTORY: ORDERING SYSTEM PROVIDED HISTORY: Hypertension TECHNOLOGIST PROVIDED HISTORY: Hypertension FINDINGS: The cardiac size is normal.  Small granuloma in the left upper lobe. No acute infiltrates or pleural effusions are seen. Pulmonary vascularity appears normal. There is mild ectasia of the thoracic aorta. There are degenerative changes in the spine . No acute bony abnormalities. The hilar structures are normal.     No acute cardiopulmonary disease     CTA HEAD W CONTRAST    Result Date: 7/12/2021  EXAMINATION: CT OF THE HEAD WITHOUT CONTRAST; CTA OF THE HEAD WITH CONTRAST 7/12/2021 7:35 pm; 7/12/2021 7:36 pm TECHNIQUE: CT of the head was performed without the administration of intravenous contrast. Dose modulation, iterative reconstruction, and/or weight based adjustment of the mA/kV was utilized to reduce the radiation dose to as low as reasonably achievable.; CTA of the head/brain was performed with the administration of intravenous contrast. Multiplanar reformatted images are provided for review.   MIP images are provided for review. Dose modulation, iterative reconstruction, and/or weight based adjustment of the mA/kV was utilized to reduce the radiation dose to as low as reasonably achievable. COMPARISON: July 12, 2021 at 1735 hours HISTORY: ORDERING SYSTEM PROVIDED HISTORY: known basal ganglia bleed, transferred here, eval for progression TECHNOLOGIST PROVIDED HISTORY: known basal ganglia bleed, transferred here, eval for progression Decision Support Exception - unselect if not a suspected or confirmed emergency medical condition->Emergency Medical Condition (MA) Reason for Exam: stroke,known bleed Acuity: Unknown Type of Exam: Unknown; ORDERING SYSTEM PROVIDED HISTORY: basal ganglia bleed, eval for vascular malformation TECHNOLOGIST PROVIDED HISTORY: basal ganglia bleed, eval for vascular malformation Decision Support Exception - unselect if not a suspected or confirmed emergency medical condition->Emergency Medical Condition (MA) Reason for Exam: stroke,known bleed Acuity: Unknown Type of Exam: Unknown FINDINGS: : CT HEAD: BRAIN/VENTRICLES: Stable appearance to right basal ganglia hematoma unchanged in size again measured at 2.6 AP by 2.2 cm transverse by 2.0 cm craniocaudal. ORBITS: The visualized portion of the orbits demonstrate no acute abnormality. SINUSES:  The visualized paranasal sinuses and mastoid air cells demonstrate no acute abnormality. SOFT TISSUES/SKULL: No acute abnormality of the visualized skull or soft tissues. CTA HEAD: ANTERIOR CIRCULATION: No significant stenosis of the intracranial internal carotid, anterior cerebral, or middle cerebral arteries. No aneurysm. POSTERIOR CIRCULATION: No significant stenosis of the vertebral, basilar, or posterior cerebral arteries. No aneurysm. OTHER: No dural venous sinus thrombosis on this non-dedicated study.      1. Stable appearance to right basal ganglia hemorrhage with intraventricular hemorrhage most likely representing rupture of the basal ganglia hemorrhage into the ventricular system with asymmetric amount of blood in the right lateral ventricle compared to the left. Blood fills the 3rd ventricle and the sylvian aqueduct is also blood in the 4th ventricle. 2. Unremarkable CTA of the head. Findings were discussed with Dr. Kenn Muniz at 7:58 pm on 7/12/2021. EMERGENCY DEPARTMENT COURSE:     CT imaging reveals stable appearance of right basal ganglia hemorrhage with intraventricular hemorrhage. CTA of head is negative. Cardene increased to 5 mg/hour, patient had systolic blood pressures 090609Q. Neuro critical care saw and evaluated patient, they accepted admission of the patient. PROCEDURES:  None    CONSULTS:  IP CONSULT TO NEUROCRITICAL CARE  IP CONSULT TO NEUROSURGERY  IP CONSULT TO PHARMACY  PHARMACY TO CHANGE BASE FLUIDS    CRITICAL CARE:  Please see attending note    FINAL IMPRESSION      1. Intracranial hemorrhage ([address]          DISPOSITION / PLAN     DISPOSITION Admitted 07/12/2021 07:41:42 PM        PATIENTREFERRED TO:  No follow-up provider specified.     DISCHARGE MEDICATIONS:  Current Discharge Medication List          Lilia Humphries DO  EmergencyMedicine Resident    (Please note that portions of this note were completed with a voice recognition program.  Efforts were made to edit the dictations but occasionally words are mis-transcribed.)        Lilia Humphries DO  Resident  07/12/21 [id number]

## 2021-07-13 NOTE — PROGRESS NOTES
I was assigned to this patient in error. I did not evaluate or treat this patient during this emergency department encounter.     Frida Bazzi DO, PGY-2  Emergency Medicine Resident  7/12/2021     11:06 PM

## 2021-07-13 NOTE — FLOWSHEET NOTE
[facility] CARE DEPARTMENT - [address]     Emergency/Trauma Note    PATIENT NAME: Ciarra Ornelas    Shift date: 7/12/21  Shift day: Monday   Shift # 3    Room # 2654/5987-14   Name: Ciarra Ornelas            Age: 78 y.o. Gender: female          Hinduism: Belinda [address] 31 of Yazidism:     Trauma/Incident type: Stroke Alert  Admit Date & Time: 7/12/2021  7:15 PM  TRAUMA NAME:     ADVANCE DIRECTIVES IN CHART? No    NAME OF DECISION MAKER:     RELATIONSHIP OF DECISION MAKER TO PATIENT:     PATIENT/EVENT DESCRIPTION:  Ciarra Ornelas is a 78 y.o. female who presents with hemorrhagic stroke as a transfer from [facility]. Patient originally presented emergency department for a headache. Patient had follow-up head CT. Patient transferred to [facility] for increase in acuity and care. Pt to be admitted to 0522/0522-01. SPIRITUAL ASSESSMENT/INTERVENTION:   was bedside support for Stroke Alert.  offered words of comfort and nurtured hope while in ED.  had prayer with pt. Meaghan Rose will follow up with \"son\" EMS spoke about son coming to hospital for visit. PATIENT BELONGINGS:  With patient    ANY BELONGINGS OF SIGNIFICANT VALUE NOTED:  N/A    REGISTRATION STAFF NOTIFIED?   Yes    WHAT IS YOUR SPIRITUAL CARE PLAN FOR THIS PATIENT?:  Chaplains are available 24/7 via [employer].        07/12/21 2757   Encounter Summary   Services provided to: Patient   Referral/Consult From: Multi-disciplinary team   Support System Children   Continue Visiting   (7/12/21)   Complexity of Encounter Moderate   Length of Encounter 30 minutes   Spiritual Assessment Completed Yes   Crisis   Type Stroke Alert   Assessment Approachable   Intervention Prayer   Outcome Comfort       Electronically signed by Shama Thomas on 7/12/2021 at 9:55 PM.  [address] [employer]  [phone]

## 2021-07-13 NOTE — PROGRESS NOTES
Daily Progress Note  Neuro Critical Care    Patient Name: Ama Ortiz  Patient : 1940  Room/Bed: [facility]-  Allergies: Allergies   Allergen Reactions    Clonidine Derivatives     Morphine     Sulfa Antibiotics        CHIEF COMPLAINT:      ICH     INTERVAL HISTORY    Initial Presentation (Admitted 21): The patient is a 78 y.o. female with a history of HTN, HLD and CVA () who presented as a transfer from [facility] with a right basal ganglia IPH. Patient initially presented to [facility] with acute onset left sided weakness. LKW around 1700, about 30 minutes prior to arrival at [facility]. Initial . Patient had stopped taking her antihypertensive medications 3 days prior. NIH 12.  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 Started on Cardene infusion for BP control. Transferred to [facility] for further evaluation and management. On arrival to [facility] ED, NIH 11. On Cardene infusion for BP control. CT Head stable. CTA Head/Neck unremarkable. Neurosurgery consulted. Admitted to Neuro ICU for close monitoring. Last 24h:   No acute events overnight. Repeat CT Head this morning stable. Clinical exam remains stable. Weaned off Cardene infusion overnight. Home Losartan-HCTZ 100-25mg daily and Norvasc 5mg QD restarted. Cardene infusion had to be resumed later this morning, wean as tolerated. Febrile this morning, 38.7C. UA& CXR ordered.     CURRENT MEDICATIONS:  SCHEDULED MEDICATIONS:   sodium chloride flush  5-40 mL Intravenous 2 times per day    levetiracetam  500 mg Intravenous Q12H    atorvastatin  80 mg Oral Nightly     CONTINUOUS INFUSIONS:   sodium chloride      niCARdipine Stopped (21 2320)     PRN MEDICATIONS:   labetalol, sodium chloride flush, sodium chloride, acetaminophen, ondansetron **OR** ondansetron, fentanNYL **OR** fentanNYL    VITALS:  Temperature Range: Temp: 98.3 °F (36.8 °C) Temp  Av.2 °F (36.8 °C)  Min: 97.9 °F (36.6 °C)  Max: 98.5 °F (36.9 °C)  BP Range: Systolic (36IOQ), HLJ:033 , Min:113 , MXT:172     Diastolic (32ZEJ), OKX:23, Min:56, Max:195    Pulse Range: Pulse  Av.8  Min: 77  Max: 104  Respiration Range: Resp  Av.9  Min: 15  Max: 22  Current Pulse Ox: SpO2: 98 %  24HR Pulse Ox Range: SpO2  Av.6 %  Min: 82 %  Max: 99 %  Patient Vitals for the past 12 hrs:   BP Temp Temp src Pulse Resp SpO2   21 0700 (!) 159/92   83 19 98 %   21 0600 (!) 161/66 98.3 °F (36.8 °C) Oral 80 17 98 %   21 0505 (!) 161/74   87 16 95 %   21 0420 (!) 168/82 98.5 °F (36.9 °C) Oral 93 20 94 %   21 0300 134/68   77 15 98 %   21 0200 (!) 139/93 98 °F (36.7 °C) Oral 96 17 97 %   21 0100 129/67   83 17 97 %   21 0000 (!) 162/80 97.9 °F (36.6 °C) Oral 102 20 97 %   21 2315 (!) 113/56   84 15 96 %   21 2300 (!) 145/65   97 16 96 %   21 2245 (!) 157/72   100 18 91 %   21 2230 139/70   94 15 93 %   21 2215 (!) 167/80   104 15 97 %   21 2200 (!) 153/80   103 16 96 %   21 2145 (!) 146/63   101 19 95 %   21 2130 139/62 98.1 °F (36.7 °C) Oral 101 18 93 %   21 (!) 146/80   98 18 96 %   21 (!) 150/80   98 17 93 %   21 (!) 116/97   99 20 92 %   21 (!) 169/104   94 18 (!) 82 %   21  98.4 °F (36.9 °C) Axillary      21 (!) 172/90   93 19 99 %     Estimated body mass index is 26.49 kg/m² as calculated from the following:    Height as of 21: 4' 11.5\" (1.511 m).     Weight as of 21: 133 lb 6.4 oz (60.5 kg).  []<16 Severe malnutrition  []1616.99 Moderate malnutrition  []1718.49 Mild malnutrition  []18.524.9 Normal  [x]2529.9 Overweight (not obese)  []3034.9 Obese class 1 (Low Risk)  []3539.9 Obese class 2 (Moderate Risk)  []?40 Obese class 3 (High Risk)    RECENT LABS:   Lab Results   Component Value Date    WBC 9.6 07/13/2021    HGB 12.9 07/13/2021    HCT 39.7 07/13/2021     07/13/2021    CHOL 144 07/13/2021    TRIG 194 (H) 07/13/2021    HDL 49 07/13/2021    ALT 35 (H) 07/12/2021    AST 27 07/12/2021     07/13/2021    K 3.8 07/13/2021     07/13/2021    CREATININE 0.63 07/13/2021    BUN 14 07/13/2021    CO2 23 07/13/2021    TSH 1.350 02/04/2021    INR 1.0 07/12/2021     24 HOUR INTAKE/OUTPUT:    Intake/Output Summary (Last 24 hours) at 7/13/2021 0745  Last data filed at 7/13/2021 0630  Gross per 24 hour   Intake 170 ml   Output 800 ml   Net -630 ml       RADIOLOGY:   CT head without contrast  Result Date: 7/13/2021  Stable examination. CT HEAD WO CONTRAST  Result Date: 7/12/2021  1. Stable appearance to right basal ganglia hemorrhage with intraventricular hemorrhage most likely representing rupture of the basal ganglia hemorrhage into the ventricular system with asymmetric amount of blood in the right lateral ventricle compared to the left. Blood fills the 3rd ventricle and the sylvian aqueduct is also blood in the 4th ventricle. 2. Unremarkable CTA of the head. Findings were discussed with Dr. Rodrigo Rolon at 7:58 pm on 7/12/2021. CT Head WO Contrast  Result Date: 7/12/2021  A 2.6 x 2.2 x 2.0 cm intraparenchymal hematoma with intraventricular hemorrhage most likely representing rupture the basal ganglia hemorrhage into the right lateral ventricle. There is some small amount of blood noted in the left lateral ventricle as well as blood filling the 3rd ventricle in the aqueduct and in the 4th ventricle. No midline shift. Diffuse volume loss is evident. Findings were discussed with Jaimie Rudd at 5:45 pm on 7/12/2021. XR CHEST PORTABLE  Result Date: 7/13/2021  1.   Prominence of the right paratracheal stripe which could be positional. If clinically indicated, this could be further evaluated with a contrast-enhanced CT chest. 2.  Mild interstitial prominence in the lungs, possibly chronic interstitial change versus edema versus pneumonitis. XR CHEST PORTABLE  Result Date: 7/12/2021  No acute cardiopulmonary disease     CTA HEAD W CONTRAST  Result Date: 7/12/2021  1. Stable appearance to right basal ganglia hemorrhage with intraventricular hemorrhage most likely representing rupture of the basal ganglia hemorrhage into the ventricular system with asymmetric amount of blood in the right lateral ventricle compared to the left. Blood fills the 3rd ventricle and the sylvian aqueduct is also blood in the 4th ventricle. 2. Unremarkable CTA of the head. Findings were discussed with Dr. Ed Harris at 7:58 pm on 7/12/2021. Labs and Images reviewed with:  [x] Dr. Rubén Hoyt    [] Dr. Hansa Humphrey  [] Dr. Morenita Blair  [] There are no new interval images to review. PHYSICAL EXAM       CONSTITUTIONAL:  Alert and oriented x 3, in no acute distress. GCS 15. Nontoxic. Mild dysarthria. No aphasia. HEAD:  normocephalic, atraumatic    EYES:  PERRL, EOMI   ENT:  moist mucous membranes   NECK:  supple, symmetric   LUNGS:  Equal air entry bilaterally, clear   CARDIOVASCULAR:  normal s1 / s2, RRR, distal pulses intact   ABDOMEN:  Soft, no rigidity, normal bowel sounds   NEUROLOGIC:  Mental Status:  A & O x3, Awake             Cranial Nerves:    II: Visual fields:  normal  III: Pupils:  equal, round, reactive to light  III,IV,VI: Extra Ocular Movements: intact  V: Facial sensation:  intact  VII: Facial strength: abnormal - left facial droop    Motor Exam:    Drift:  present - left upper and lower extremities  Tone:  Decreased left upper extremity    5/5 strength in the right upper and right lower extremities  0/5 strength in the left upper extremity, no movement to pain.   Withdraws to pain in the left lower extremity      Sensory:    Touch:    Right Upper Extremity:  normal  Left Upper Extremity:  abnormal - decreased  Right Lower Extremity:  normal  Left Lower Extremity:  abnormal - decreased NIH Stroke Scale Total (if not done complete detailed one below):    1a.  Level of consciousness:  0 - alert; keenly responsive  1b. Level of consciousness questions:  0 - answers both questions correctly  1c. Level of consciousness questions:  0 - performs both tasks correctly  2. Best Gaze:  0 - normal  3. Visual:  0 - no visual loss  4. Facial Palsy:  2 - partial paralysis (total or near total paralysis of the lower face)  5a. Motor left arm:  4 - no movement  5b. Motor right arm:  0 - no drift, limb holds 90 (or 45) degrees for full 10 seconds  6a. Motor left leg:  3 - no effort against gravity; leg falls to bed immediately  6b. Motor right le - no drift; leg holds 30 degree position for full 5 seconds  7. Limb Ataxia:  0 - absent  8. Sensory:  1 - mild to moderate sensory loss; patient feels pinprick is less sharp or is dull on the affected side; there is a loss of superficial pain with pinprick but patient is aware of being touched   9. Best Language:  0 - no aphasia, normal  10. Dysarthria:  1 - mild to moderate, patient slurs at least some words and at worst, can be understood with some difficulty  11. Extinction and Inattention:  1 - visual, tactile, auditory, spatial or personal inattention or extinction to bilateral simultaneous stimulation in one of the sensory modalities   TOTAL:     DRAINS:  [x] There are no drains for Neuro Critical Care to monitor at this time. ASSESSMENT AND PLAN:       The patient is a 77 yo female with a history of  HTN, HLD and CVA () who presented as a transfer from [facility] with a right basal ganglia IPH. initially presented to [facility] ED with acute onset left sided weakness. Found to be hypertensive with . CT Head revealed right basal ganglia IPH with intraventricular extension.       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  - Etiology likely hypertension  - CTA Head/Neck unremarkable  - Repeat CT Head this AM stable  - Keppra 500mg Q12h for seizure prophylaxis  - Goal SBP<160  - Neuro checks per protocol    CARDIOVASCULAR:  - Goal SBP<160  - PRN Labetalol  - Essential HTN  - Resume home Losartan-HCTZ 100-25mg daily, Norvasc 5mg QD  - Troponin 11, EKG sinus rhythm, left anterior fascicular block  - Echo pending  - Lipid panel; LDL 56, cholesterol 144 - no need for statin in setting of ICH  - Continue telemetry    PULMONARY:  - Maintaining O2 sats on 0-2L nasal canula  - Wean O2 as tolerates  - Incentive spirometry  - CXR showed prominence or right paratracheal stripe which could be positional, mild interstitial prominence  - Consider CT chest if needed    RENAL/FLUID/ELECTROLYTE:  - Normal renal functioning  - BUN 14/ Creatinine 0.63  - Monitor I&O; 170/800  - No maintenance IVF, tolerating PO  - Replace electrolytes PRN  - Daily BMP    GI/NUTRITION:  NUTRITION:  ADULT DIET; Regular   - Passed speech bedside swallow eval; Easy to chew, thin liquids  - Chronic diarrhea  - Bowel regimen: Held due to above  - GI prophylaxis: N/A    ID:  - Febrile x1 this AM, 38.7C  - No leukocytosis, WBC 9.6  - UA pending  - CXR with mild interstitial prominence, edema vs pneumonitis  - Monitor off antibiotics for now  - Consider starting empiric antibiotics if fever recurs or leukocytosis   - Daily CBC    HEME:   - H&H 12.9/39.7  - Platelets 188  - Daily CBC    ENDOCRINE:  - Continue to monitor blood glucose, goal <180  - Hemoglobin A1C 6.3  - Glucose well controlled    OTHER:  - PT/OT/ST   - Code Status: FULL    PROPHYLAXIS:  Stress ulcer: N/A    DVT PROPHYLAXIS:  - SCD sleeves - Thigh High   - No chemoprophylaxis anticoagulation at this time due to [address]:  [x] To remain ICU for close neurological monitoring, blood pressure monitoring. We will continue to follow along. For any changes in exam or patient status please contact Neuro Critical Care.       CHRISTIANNE Lewis - CNP  Neuro Critical

## 2021-07-13 NOTE — PROGRESS NOTES
Speech Language Pathology  Facility/Department: [address]   CLINICAL BEDSIDE SWALLOW EVALUATION    NAME: Christopher Poole  : 1940  MRN: [id number]    ADMISSION DATE: 2021  ADMITTING DIAGNOSIS: has Personal history of other malignant neoplasm of skin; Neoplasm of uncertain behavior of skin; Essential hypertension; Osteoarthritis; Chronic diarrhea; Acute cerebrovascular accident (CVA) ([address] Intracranial hemorrhage ([address] and Hypertensive emergency on their problem list.     Date of Eval: 2021  Evaluating Therapist: [address]    Current Diet level:  Current Diet : Regular  Current Liquid Diet : Thin    Primary Complaint: The patient is a 78 y.o. female presented to [facility] emergency department with complaint of a headache. At  patient was noted to have a change in mental status with new left-sided weakness. CT head was obtained at that time which showed IPH with blood entering the third and fourth ventricles. There was also concern at that time for being a right basal ganglia bleed. Patient was auto launched from [facility] facility to [facility] ER. In route patient was started on Cardene. Pain:  Pain Assessment  Pain Assessment: 0-10  Pain Type: Acute pain  Pain Location: Head      Reason for Referral  Christopher Poole was referred for a bedside swallow evaluation to assess the efficiency of her swallow function, identify signs and symptoms of aspiration and make recommendations regarding safe dietary consistencies, effective compensatory strategies, and safe eating environment. Impression  Patient presents with probable safe swallow for easy to chew diet with thin liquids as evidenced by no overt s/s of aspiration noted with consistencies tested. Recommend small sips and bites, only feed when alert and awake and upright at 90 degrees for all PO intake.   Recommend close monitoring for overt/clinical s/s of aspiration and D/C PO intake and complete Modified Barium Swallow Study should they occur. Results and recommendations reported to RN. Treatment Plan  Requires SLP Intervention: Yes  Duration/Frequency of Treatment: 1-2x/week  D/C Recommendations: Further therapy recommended at discharge. Recommended Diet and Intervention  Diet Solids Recommendation:  (Easy to Chew)  Liquid Consistency Recommendation: Thin  Recommended Form of Meds: Meds in puree     Therapeutic Interventions: Diet tolerance monitoring;Patient/Family education    Compensatory Swallowing Strategies  Compensatory Swallowing Strategies: Alternate solids and liquids;Eat/Feed slowly;Upright as possible for all oral intake; Check for pocketing of food on the Left;Small bites/sips    Treatment/Goals  Dysphagia Goals: The patient will tolerate recommended diet without observed clinical signs of aspiration; The patient/caregiver will demonstrate understanding of compensatory strategies for improved swallowing safety. General  Chart Reviewed: Yes  Behavior/Cognition: Alert; Cooperative  Respiratory Status: Room air  Breath Sounds: Clear  O2 Device: None (Room air)  Communication Observation: Functional  Follows Directions: Complex  Dentition: Some missing teeth  Patient Positioning: Upright in bed  Baseline Vocal Quality: Normal  Consistencies Administered: Dysphagia Soft and Bite-Sized (Dysphagia III); Dysphagia Minced and Moist (Dysphagia II); Dysphagia Pureed (Dysphagia I); Nectar - teaspoon;Nectar - straw; Thin - teaspoon; Thin - straw      Oral Motor Deficits  Oral/Motor  Oral Motor: Exceptions to [facility]  Labial ROM: Reduced left  Labial Symmetry: Abnormal symmetry left    Oral Phase Dysfunction  Oral Phase  Oral Phase: WFL  Oral Phase  Oral Phase - Comment: adequate mastication and A-P transit for all consistencies tested     Indicators of Pharyngeal Phase Dysfunction   Pharyngeal Phase  Pharyngeal Phase: WFL  Pharyngeal Phase   Pharyngeal: no overt s/s of aspiration on all consistencies tested. Prognosis  Prognosis  Prognosis for safe diet advancement: fair  Individuals consulted  Consulted and agree with results and recommendations: Patient; Family member;RN  Family member consulted: daughter    Education  Patient Education: yes  Patient Education Response: Verbalizes understanding             Therapy Time  SLP Individual Minutes  Time In: 0932  Time Out: 3994  Minutes: 10          Completed by: [address]  Clinician    Cosigned By: Vahid Epstein S.CCC/SLP

## 2021-07-13 NOTE — CARE COORDINATION
Case Management Initial Discharge Plan  Ama Ortiz,             Met with daughter at bedside to discuss discharge plans. Information verified: address, contacts, phone number, , insurance Yes  Insurance Provider: [facility]    Emergency Contact/Next of Kin name & number: Michaela Amaya, daughter [phone] and Marian Blair, son [phone]  Who are involved in patient's support system? children    PCP: Corazon Bateman, APRN - CNP  Date of last visit: not sure      Discharge Planning    Living Arrangements:  Alone     Home has 1 story      Patient able to perform ADL's:Independent    Current Services (outpatient & in home) none  DME equipment: none  DME provider: n/a    Is patient receiving oral anticoagulation therapy? No        Potential Assistance Needed:  Elliott Dietrich    Patient agreeable to home care: No  [place] of choice provided:  n/a    Prior SNF/Rehab Placement and Facility: [place] [id number] to SNF/Rehab: Yes  [place] of choice provided: yes     Evaluation: no    Expected Discharge date:       Patient expects to be discharged to:  SNF vs AL    If home: is the family and/or caregiver wiling & able to provide support at home? Probable not home  Who will be providing this support? Follow Up Appointment: Best Day/ Time:      Transportation provider: children or ambulance, depending on progress  Transportation arrangements needed for discharge: TBD    Readmission Risk              Risk of Unplanned Readmission:  10             Does patient have a readmission risk score greater than 14?: No  If yes, follow-up appointment must be made within 7 days of discharge. Goals of Care: CVA stable      Educated daughter on transitional options, provided freedom of choice and are agreeable with plan      Discharge Plan: Most likely SNF, the AL    Writer calls [facility] SNF and spoke with Nickie Perze.  Gave daughter both Arlene's number in admissions at [facility] and also Annie's number at [place], [facility] that is AL after daughter voiced concern she doesn't think Mom can live on her own anymore      Electronically signed by Lupillo Hutson RN on 7/13/21 at 3:23 PM EDT

## 2021-07-13 NOTE — PROGRESS NOTES
Occupational [address]  Occupational Therapy Not Seen Note    DATE: 2021    NAME: Franki Bustamante  MRN: [id number]   : 1940      Patient not seen this date for Occupational Therapy due to:     Other: PT just finished, patient now nauseous, hold OT eval    Next Scheduled Treatment: Check back  as appropriate    Electronically signed by LINCOLN Alicea/OT on 2021 at 2:08 PM

## 2021-07-13 NOTE — PROGRESS NOTES
SYSTEM PROVIDED HISTORY[place] [facility] [place] TECHNOLOGIST PROVIDED HISTORY: [facility] [place] Decision Support Exception - unselect if not a suspected or confirmed emergency medical condition->Emergency Medical Condition (MA) Reason for Exam: stroke Acuity: Unknown Type of Exam: Subsequent/Follow-up FINDINGS: BRAIN/VENTRICLES: There has been no significant change in the 2.1 cm right thalamic hemorrhage with intraventricular rupture and minor hydrocephalus. There is mild diffuse cerebral atrophy and chronic white matter ischemic change. Old lacunar infarct noted at the mid left corona radiata. ORBITS: The visualized portion of the orbits demonstrate no acute abnormality. SINUSES: The visualized paranasal sinuses and mastoid air cells demonstrate no acute abnormality. SOFT TISSUES/SKULL:  No acute abnormality of the visualized skull or soft tissues. Stable examination. CT HEAD WO CONTRAST    Result Date: 7/12/2021  EXAMINATION: CT OF THE HEAD WITHOUT CONTRAST; CTA OF THE HEAD WITH CONTRAST 7/12/2021 7:35 pm; 7/12/2021 7:36 pm TECHNIQUE: CT of the head was performed without the administration of intravenous contrast. Dose modulation, iterative reconstruction, and/or weight based adjustment of the mA/kV was utilized to reduce the radiation dose to as low as reasonably achievable.; CTA of the head/brain was performed with the administration of intravenous contrast. Multiplanar reformatted images are provided for review. MIP images are provided for review. Dose modulation, iterative reconstruction, and/or weight based adjustment of the mA/kV was utilized to reduce the radiation dose to as low as reasonably achievable.  COMPARISON: July 12, 2021 at 1735 hours HISTORY: ORDERING SYSTEM PROVIDED HISTORY: known basal ganglia bleed, transferred here, eval for progression TECHNOLOGIST PROVIDED HISTORY: known basal ganglia bleed, transferred here, eval for progression Decision Support Exception - unselect if not a suspected or confirmed emergency medical condition->Emergency Medical Condition (MA) Reason for Exam: stroke,known bleed Acuity: Unknown Type of Exam: Unknown; ORDERING SYSTEM PROVIDED HISTORY: basal ganglia bleed, eval for vascular malformation TECHNOLOGIST PROVIDED HISTORY: basal ganglia bleed, eval for vascular malformation Decision Support Exception - unselect if not a suspected or confirmed emergency medical condition->Emergency Medical Condition (MA) Reason for Exam: stroke,known bleed Acuity: Unknown Type of Exam: Unknown FINDINGS: : CT HEAD: BRAIN/VENTRICLES: Stable appearance to right basal ganglia hematoma unchanged in size again measured at 2.6 AP by 2.2 cm transverse by 2.0 cm craniocaudal. ORBITS: The visualized portion of the orbits demonstrate no acute abnormality. SINUSES:  The visualized paranasal sinuses and mastoid air cells demonstrate no acute abnormality. SOFT TISSUES/SKULL: No acute abnormality of the visualized skull or soft tissues. CTA HEAD: ANTERIOR CIRCULATION: No significant stenosis of the intracranial internal carotid, anterior cerebral, or middle cerebral arteries. No aneurysm. POSTERIOR CIRCULATION: No significant stenosis of the vertebral, basilar, or posterior cerebral arteries. No aneurysm. OTHER: No dural venous sinus thrombosis on this non-dedicated study. 1. Stable appearance to right basal ganglia hemorrhage with intraventricular hemorrhage most likely representing rupture of the basal ganglia hemorrhage into the ventricular system with asymmetric amount of blood in the right lateral ventricle compared to the left. Blood fills the 3rd ventricle and the sylvian aqueduct is also blood in the 4th ventricle. 2. Unremarkable CTA of the head. Findings were discussed with Dr. Nelida Halsted at 7:58 pm on 7/12/2021.      CT Head WO Contrast    Result Date: 7/12/2021  EXAMINATION: CT OF THE HEAD WITHOUT CONTRAST  7/12/2021 5:36 pm TECHNIQUE: CT of the head was performed without the administration of intravenous contrast. Dose modulation, iterative reconstruction, and/or weight based adjustment of the mA/kV was utilized to reduce the radiation dose to as low as reasonably achievable. COMPARISON: None. HISTORY: ORDERING SYSTEM PROVIDED HISTORY: Left hemiplegia TECHNOLOGIST PROVIDED HISTORY: Left hemiplegia Decision Support Exception - unselect if not a suspected or confirmed emergency medical condition->Emergency Medical Condition (MA) FINDINGS: BRAIN/VENTRICLES: Right basal ganglia hemorrhage 2.6 cm AP x 2.2 cm transverse by 2.0 cm craniocaudal with hematoma volume estimated 5.7 mL. There is hemorrhage twitches in the ventricular system most likely secondary to basal ganglia hemorrhage rupture into the ventricular system is blood predominantly in the right lateral ventricle 3rd ventricle aqueduct with blood also noted in the left lateral ventricle to a smaller extent there is also blood noted in 4th ventricle. There is no midline shift at this time. There is diffuse volume loss with extensive white matter changes noted bilaterally old infarct in the left periventricular white matter No blood is noted in the basal cisterns or the sylvian fissure or middle cerebral artery cisterns. ORBITS: The visualized portion of the orbits demonstrate no acute abnormality. SINUSES: The visualized paranasal sinuses and mastoid air cells demonstrate no acute abnormality. SOFT TISSUES/SKULL:  No acute abnormality of the visualized skull or soft tissues. A 2.6 x 2.2 x 2.0 cm intraparenchymal hematoma with intraventricular hemorrhage most likely representing rupture the basal ganglia hemorrhage into the right lateral ventricle. There is some small amount of blood noted in the left lateral ventricle as well as blood filling the 3rd ventricle in the aqueduct and in the 4th ventricle. No midline shift. Diffuse volume loss is evident.  Findings were discussed with Bia Sheldon at 5:45 pm on unselect if not a suspected or confirmed emergency medical condition->Emergency Medical Condition (MA) Reason for Exam: stroke,known bleed Acuity: Unknown Type of Exam: Unknown; ORDERING SYSTEM PROVIDED HISTORY: basal ganglia bleed, eval for vascular malformation TECHNOLOGIST PROVIDED HISTORY: basal ganglia bleed, eval for vascular malformation Decision Support Exception - unselect if not a suspected or confirmed emergency medical condition->Emergency Medical Condition (MA) Reason for Exam: stroke,known bleed Acuity: Unknown Type of Exam: Unknown FINDINGS: : CT HEAD: BRAIN/VENTRICLES: Stable appearance to right basal ganglia hematoma unchanged in size again measured at 2.6 AP by 2.2 cm transverse by 2.0 cm craniocaudal. ORBITS: The visualized portion of the orbits demonstrate no acute abnormality. SINUSES:  The visualized paranasal sinuses and mastoid air cells demonstrate no acute abnormality. SOFT TISSUES/SKULL: No acute abnormality of the visualized skull or soft tissues. CTA HEAD: ANTERIOR CIRCULATION: No significant stenosis of the intracranial internal carotid, anterior cerebral, or middle cerebral arteries. No aneurysm. POSTERIOR CIRCULATION: No significant stenosis of the vertebral, basilar, or posterior cerebral arteries. No aneurysm. OTHER: No dural venous sinus thrombosis on this non-dedicated study. 1. Stable appearance to right basal ganglia hemorrhage with intraventricular hemorrhage most likely representing rupture of the basal ganglia hemorrhage into the ventricular system with asymmetric amount of blood in the right lateral ventricle compared to the left. Blood fills the 3rd ventricle and the sylvian aqueduct is also blood in the 4th ventricle. 2. Unremarkable CTA of the head. Findings were discussed with Dr. Sujata Swift at 7:58 pm on 7/12/2021.        A/P  78 y.o. female who presents with headache, Basal ganglia hemorrhage with intraventricular hemorrhage     Continue to monitor neuro exam  Please contact neurosurgery with any changes in patients neurologic status.        Jania Anderson CNP  7/13/21  6:42 AM

## 2021-07-13 NOTE — CONSULTS
Department of Neurosurgery                                       Resident Consult Note      Reason for Consult: Concern for possible hydrocephalus with intraventricular hemorrhage  Requesting Physician: Dr. Shirin Wetzel:   [x]Dr. Migel Wood  []Dr. Nkechi Dai  []Dr. Yogesh Dietz     History Obtained From:  patient, electronic medical record    CHIEF COMPLAINT:         Hemorrhagic stroke    HISTORY OF PRESENT ILLNESS:       The patient is a 78 y.o. female who presents with hemorrhagic stroke as a transfer from [place] [id number]. Patient originally presented emergency department for a headache. Patient had acute mental status change around 3:30 PM while in the emergency department. Patient had follow-up head CT which showed basal ganglier stroke with intraventricular hemorrhage. Patient transferred to [facility] for increase in acuity and care. 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 with unspecified severity (HCC)     Nondisp fx of fourth metatarsal bone of right foot with routine heal     Obstructive sleep apnea     Osteoarthritis     Pain in right foot     Spinal stenosis     Surgical aftercare, neoplasm     Vitamin D deficiency        Past Surgical History:        Procedure Laterality Date    APPENDECTOMY      CHOLECYSTECTOMY      HERNIA REPAIR      HYSTERECTOMY      TONSILLECTOMY      TUBAL LIGATION         Social History:   Social History     Socioeconomic History    Marital status:       Spouse name: Not on file    Number of children: Not on file    Years of education: Not on file    Highest education level: Not on file   Occupational History    Not on file   Tobacco Use    Smoking status: Never Smoker    Smokeless tobacco: Never Used   Substance and Sexual Activity    Alcohol use: Yes    Drug use: Never    Sexual activity: Not Currently   Other Topics Concern    Not on file   Social History Narrative    Not on file     Social Determinants of Health     Financial Resource Strain:     Difficulty of Paying Living Expenses:    Food Insecurity:     Worried About Running Out of Food in the Last Year:     [address] in the Last Year:    Transportation Needs:     Lack of Transportation (Medical):  Lack of Transportation (Non-Medical):    Physical Activity:     Days of Exercise per Week:     Minutes of Exercise per Session:    Stress:     Feeling of Stress :    Social Connections:     Frequency of Communication with Friends and Family:     Frequency of Social Gatherings with Friends and Family:     Attends Zoroastrian Services:     Active Member of Clubs or Organizations:     Attends Club or Organization Meetings:     Marital Status:    Intimate Partner Violence:     Fear of Current or Ex-Partner:     Emotionally Abused:     Physically Abused:     Sexually Abused:        Family History:       Problem Relation Age of Onset    Diabetes Mother     Stroke Father     Heart Disease Father        Allergies:  Clonidine derivatives, Morphine, and Sulfa antibiotics    Home Medications:  Prior to Admission medications    Medication Sig Start Date End Date Taking?  Authorizing Provider   cholestyramine (QUESTRAN) 4 GM/DOSE powder TAKE ONE SCOOP DISSOLVED IN A GLASS OF WATER TWICE A DAY AS NEEDED. 5/17/21   Dauna Other, APRN - CNP   NONFORMULARY Indications: Natural supplement to help with elevated BP STRICTION BP - 2 TABLETS 2 TIMES A DAY    Historical Provider, MD   Aspirin Buf,CaCarb-MgCarb-MgO, (BUFFERIN PO) Take 2 tablets by mouth 4 times daily as needed Per pt    Historical Provider, MD   Melatonin 10 MG TABS Take by mouth nightly as needed    Historical Provider, MD   Omega-3 Fatty Acids (FISH OIL PO)     Historical Provider, MD   losartan-hydroCHLOROthiazide (HYZAAR) 100-25 MG per tablet TAKE ONE TABLET DAILY. 2/12/21   CHRISTIANNE Hernandez - CNP   amLODIPine (NORVASC) 5 MG tablet TAKE ONE TABLET DAILY. 2/12/21   Erin November, CHRISTIANNE - CNP   UNABLE TO FIND CBD Oil per pt report    Historical Provider, MD   CALCIUM-MAGNESIUM-VITAMIN D PO     Historical Provider, MD   Potassium Gluconate 595 (99 K) MG TABS Take by mouth daily    Historical Provider, MD   Flaxseed, Linseed, (FLAXSEED OIL PO)     Historical Provider, MD   aspirin-acetaminophen-caffeine (Finas Pelt) 680-828-70 MG per tablet Take 2 tablets by mouth daily as needed for Headaches    Historical Provider, MD   Magnesium Oxide 250 MG TABS Take 1 tablet by mouth daily    Historical Provider, MD   ferrous sulfate 324 (65 Fe) MG EC tablet Take 324 mg by mouth daily (with breakfast)    Historical Provider, MD       Current Medications:   Current Facility-Administered Medications: niCARdipine (CARDENE) 20 mg in 0.9 % sodium chloride 200 mL solution, 3-15 mg/hr, Intravenous, Continuous    REVIEW OF SYSTEMS:       CONSTITUTIONAL: negative for fatigue and malaise   EYES: negative for double vision and photophobia    HEENT: negative for tinnitus and sore throat   RESPIRATORY: negative for cough, shortness of breath   CARDIOVASCULAR: negative for chest pain, palpitations   GASTROINTESTINAL: negative for nausea, vomiting   GENITOURINARY: negative for incontinence   MUSCULOSKELETAL: negative for neck or back pain   NEUROLOGICAL: negative for seizures, positive for headaches   PSYCHIATRIC: negative for agitated     Review of systems otherwise negative.     PHYSICAL EXAM:       BP (!) 174/100   Pulse 92   Temp 98.4 °F (36.9 °C) (Axillary)   Resp 22   SpO2 95%       PHYSICAL EXAM:  CONSTITUTIONAL:  Well developed, well nourished, alert and oriented x 3, in no acute distress. GCS 15. Nontoxic. No dysarthria. No aphasia. HEAD:  normocephalic, atraumatic    EYES:  PERRLA, EOMI. no gaze palsy   ENT:  moist mucous membranes   LUNGS:  Equal air entry bilaterally   CARDIOVASCULAR:  normal s1 / s2   ABDOMEN:  Soft, no rigidity   NECK supple, symmetric, no midline tenderness to palpation    BACK without midline tenderness, step-offs or deformities    EXTREMITIES Normal ROM on right side, range of motion limited on left   NEUROLOGIC:  Mental Status:  ,awake             Cranial Nerves:    cranial nerves II-XII are grossly intact     Motor Exam:    Drift:  absent  Tone:  abnormal -left-sided loss of tone     Motor exam is 5 out of 5 right upper and right lower extremities  Left-sided weakness: Left upper extremity, left lower extremity.     Sensory:    Touch:    Right Upper Extremity:  normal  Left Upper Extremity:  abnormal -lack of sharp dull differentiation  Right Lower Extremity:  normal  Left Lower Extremity:  abnormal -lack of sharp dull differentiation     Deep Tendon Reflexes:    Right Bicep:  2+  Left Bicep:  2+  Right Knee:  2+  Left Knee:  2+     Plantar Response:  Right:  no response  Left:  no response     Clonus:  absent  Maloney's:  absent     Coordination/Dysmetria:  Heel to Shin:  Right:  normal  Left:  abnormal -unable to complete heel-to-shin  Finger to Nose:   Right:  normal  Left:  normal   Dysdiadochokinesia:  absent     Gait: Patient was unable to ambulate   SKIN No obvious ecchymosis, rashes, or lesions    NIH Stroke Scale Total (if not done complete detailed one below):    1a.  Level of consciousness:  0 - alert; keenly responsive  1b. Level of consciousness questions:  0 - answers both questions correctly  1c. Level of consciousness questions:  0 - performs both tasks correctly  2. Best Gaze:  0 - normal  3. Visual:  0 - no visual loss  4.     Facial Palsy:  3 - complete paralysis of one or both sides (absence of facial movement in the upper and mA/kV was utilized to reduce the radiation dose to as low   as reasonably achievable.; CTA of the head/brain was performed with the   administration of intravenous contrast. Multiplanar reformatted images are   provided for review.  MIP images are provided for review.  Dose modulation,   iterative reconstruction, and/or weight based adjustment of the mA/kV was   utilized to reduce the radiation dose to as low as reasonably achievable.       COMPARISON:   July 12, 2021 at 1735 hours       HISTORY:   ORDERING SYSTEM PROVIDED HISTORY: known basal ganglia bleed, transferred   here, eval for progression   TECHNOLOGIST PROVIDED HISTORY:       known basal ganglia bleed, transferred here, eval for progression   Decision Support Exception - unselect if not a suspected or confirmed   emergency medical condition->Emergency Medical Condition (MA)   Reason for Exam: stroke,known bleed   Acuity: Unknown   Type of Exam: Unknown; ORDERING SYSTEM PROVIDED HISTORY: basal ganglia bleed,   eval for vascular malformation   TECHNOLOGIST PROVIDED HISTORY:   basal ganglia bleed, eval for vascular malformation       Decision Support Exception - unselect if not a suspected or confirmed   emergency medical condition->Emergency Medical Condition (MA)   Reason for Exam: stroke,known bleed   Acuity: Unknown   Type of Exam: Unknown       FINDINGS:   :           CT HEAD:       BRAIN/VENTRICLES: Stable appearance to right basal ganglia hematoma unchanged   in size again measured at 2.6 AP by 2.2 cm transverse by 2.0 cm craniocaudal.       ORBITS: The visualized portion of the orbits demonstrate no acute abnormality.       SINUSES:  The visualized paranasal sinuses and mastoid air cells demonstrate   no acute abnormality.       SOFT TISSUES/SKULL: No acute abnormality of the visualized skull or soft   tissues.           CTA HEAD:       ANTERIOR CIRCULATION: No significant stenosis of the intracranial internal   carotid, anterior cerebral, or middle cerebral arteries. No aneurysm.       POSTERIOR CIRCULATION: No significant stenosis of the vertebral, basilar, or   posterior cerebral arteries. No aneurysm.       OTHER: No dural venous sinus thrombosis on this non-dedicated study. Narrative   EXAMINATION:   CT OF THE HEAD WITHOUT CONTRAST; CTA OF THE HEAD WITH CONTRAST 7/12/2021 7:35   pm; 7/12/2021 7:36 pm       TECHNIQUE:   CT of the head was performed without the administration of intravenous   contrast. Dose modulation, iterative reconstruction, and/or weight based   adjustment of the mA/kV was utilized to reduce the radiation dose to as low   as reasonably achievable.; CTA of the head/brain was performed with the   administration of intravenous contrast. Multiplanar reformatted images are   provided for review.  MIP images are provided for review.  Dose modulation,   iterative reconstruction, and/or weight based adjustment of the mA/kV was   utilized to reduce the radiation dose to as low as reasonably achievable.       COMPARISON:   July 12, 2021 at 1735 hours       HISTORY:   ORDERING SYSTEM PROVIDED HISTORY: known basal ganglia bleed, transferred   here, eval for progression   TECHNOLOGIST PROVIDED HISTORY:       known basal ganglia bleed, transferred here, eval for progression   Decision Support Exception - unselect if not a suspected or confirmed   emergency medical condition->Emergency Medical Condition (MA)   Reason for Exam: stroke,known bleed   Acuity: Unknown   Type of Exam: Unknown; ORDERING SYSTEM PROVIDED HISTORY: basal ganglia bleed,   eval for vascular malformation   TECHNOLOGIST PROVIDED HISTORY:   basal ganglia bleed, eval for vascular malformation       Decision Support Exception - unselect if not a suspected or confirmed   emergency medical condition->Emergency Medical Condition (MA)   Reason for Exam: stroke,known bleed   Acuity: Unknown   Type of Exam: Unknown       FINDINGS:   :           CT HEAD:       BRAIN/VENTRICLES: Stable appearance to right basal ganglia hematoma unchanged   in size again measured at 2.6 AP by 2.2 cm transverse by 2.0 cm craniocaudal.       ORBITS: The visualized portion of the orbits demonstrate no acute abnormality.       SINUSES:  The visualized paranasal sinuses and mastoid air cells demonstrate   no acute abnormality.       SOFT TISSUES/SKULL: No acute abnormality of the visualized skull or soft   tissues.           CTA HEAD:       ANTERIOR CIRCULATION: No significant stenosis of the intracranial internal   carotid, anterior cerebral, or middle cerebral arteries. No aneurysm.       POSTERIOR CIRCULATION: No significant stenosis of the vertebral, basilar, or   posterior cerebral arteries. No aneurysm.       OTHER: No dural venous sinus thrombosis on this non-dedicated study. ASSESSMENT AND PLAN:       Patient Active Problem List   Diagnosis    Personal history of other malignant neoplasm of skin    Neoplasm of uncertain behavior of skin    Essential hypertension    Osteoarthritis    Chronic diarrhea    Acute cerebrovascular accident (CVA) ([facility] [address]         A/P:  This is a 78 y.o. 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 Patient's repeat scans were stable. Patient has severe left-sided deficits and is admitted to the neuro critical care service. Patient care will be discussed with attending, will reevaluate patient along with attending     - No neurosurgical interventions planned for now  - HOB: 30 degrees   - Obtain CT head in AM 0730   - Neuro checks per protocol  - Hold all antiplatelets and anticoagulants  - We recommend SBP < 160   - Determine the lower limit of SBP clinically based on mentation      Additional recommendations may follow    Please contact neurosurgery with any changes in patients neurologic status. Thank you for your consult.        No Valerio MD     7/12/2021  8:29 PM

## 2021-07-13 NOTE — H&P
Neuro ICU History & Physical    Patient Name: Dallin Salinas  Patient : 1940  Room/Bed:   Code Status: full  Allergies: Allergies   Allergen Reactions    Clonidine Derivatives     Morphine     Sulfa Antibiotics        CHIEF COMPLAINT     Headache, left-sided weakness (stroke)    HPI    History Obtained From: EMR, PT and EMS     The patient is a 78 y.o. female presented to [facility] emergency department with complaint of a headache. At [address] patient was noted to have a change in mental status with new left-sided weakness. CT head was obtained at that time which showed IPH with blood entering the third and fourth ventricles. There was also concern at that time for being a right basal ganglia bleed. Patient was auto launched from [facility] to [facility] ER. In route patient was started on Cardene. 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 with unspecified severity (HCC)     Nondisp fx of fourth metatarsal bone of right foot with routine heal     Obstructive sleep apnea     Osteoarthritis     Pain in right foot     Spinal stenosis     Surgical aftercare, neoplasm     Vitamin D deficiency        Past Surgical History:        Procedure Laterality Date    APPENDECTOMY      CHOLECYSTECTOMY      HERNIA REPAIR      HYSTERECTOMY      TONSILLECTOMY      TUBAL LIGATION         Social History:   Social History     Socioeconomic History    Marital status:       Spouse name: Not on file    Number of children: Not on file    Years of education: Not on file    Highest education level: Not on file   Occupational History    Not nausea, vomiting, diarrhea, or constipation    GENITOURINARY: negative for incontinence or retention    MUSCULOSKELETAL: negative for neck or back pain, negative for extremity pain   NEUROLOGICAL: Negative for seizures, positive for headaches, positive for previous strokes with mildly residual left-sided sensory deficits. Negative for weakness, numbness, confusion, aphasia, dysarthria    PSYCHIATRIC: negative for agitation, hallucination, SI/HI   SKIN Negative for spontaneous contusions, rashes, or lesions      PHYSICAL EXAM:     BP (!) 174/100   Pulse 92   Temp 98.4 °F (36.9 °C) (Axillary)   Resp 22   SpO2 95%     PHYSICAL EXAM:  CONSTITUTIONAL:  Well developed, well nourished, alert and oriented x 3, in no acute distress. GCS 15. Nontoxic. No dysarthria. No aphasia. HEAD:  normocephalic, atraumatic    EYES:  PERRLA, EOMI. no gaze palsy   ENT:  moist mucous membranes   LUNGS:  Equal air entry bilaterally   CARDIOVASCULAR:  normal s1 / s2   ABDOMEN:  Soft, no rigidity   NECK supple, symmetric, no midline tenderness to palpation    BACK without midline tenderness, step-offs or deformities    EXTREMITIES Normal ROM on right side, range of motion limited on left   NEUROLOGIC:  Mental Status:  ,awake             Cranial Nerves:    cranial nerves II-XII are grossly intact    Motor Exam:    Drift:  absent  Tone:  abnormal -left-sided loss of tone    Motor exam is 5 out of 5 right upper and right lower extremities  Left-sided weakness: Left upper extremity, left lower extremity.     Sensory:    Touch:    Right Upper Extremity:  normal  Left Upper Extremity:  abnormal -lack of sharp dull differentiation  Right Lower Extremity:  normal  Left Lower Extremity:  abnormal -lack of sharp dull differentiation    Deep Tendon Reflexes:    Right Bicep:  2+  Left Bicep:  2+  Right Knee:  2+  Left Knee:  2+    Plantar Response:  Right:  no response  Left:  no response    Clonus:  absent  Maloney's: absent    Coordination/Dysmetria:  Heel to Shin:  Right:  normal  Left:  abnormal -unable to complete heel-to-shin  Finger to Nose:   Right:  normal  Left:  normal   Dysdiadochokinesia:  absent    Gait: Patient was unable to ambulate   SKIN No obvious ecchymosis, rashes, or lesions    NIH Stroke Scale Total (if not done complete detailed one below):    1a.  Level of consciousness:  0 - alert; keenly responsive  1b. Level of consciousness questions:  0 - answers both questions correctly  1c. Level of consciousness questions:  0 - performs both tasks correctly  2. Best Gaze:  0 - normal  3. Visual:  0 - no visual loss  4. Facial Palsy:  3 - complete paralysis of one or both sides (absence of facial movement in the upper and lower face)  5a. Motor left arm:  4 - no movement  5b. Motor right arm:  0 - no drift, limb holds 90 (or 45) degrees for full 10 seconds  6a. Motor left leg:  3 - no effort against gravity; leg falls to bed immediately  6b. Motor right le - no drift; leg holds 30 degree position for full 5 seconds  7. Limb Ataxia:  1 - present in one limb  8. Sensory:  1 - mild to moderate sensory loss; patient feels pinprick is less sharp or is dull on the affected side; there is a loss of superficial pain with pinprick but patient is aware of being touched   9. Best Language:  0 - no aphasia, normal  10. Dysarthria:  0 - normal  11.   Extinction and Inattention:  0 - no abnormality   Total: 12    LABS AND IMAGING:     RECENT LABS:  CBC with Differential:    Lab Results   Component Value Date    WBC 9.5 2021    RBC 4.62 2021    RBC 4.26 2021    HGB 14.2 2021    HCT 42.1 2021     2021    MCV 91.1 2021    MCH 30.7 2021    MCHC 33.7 2021    RDW 13.0 2021    LYMPHOPCT 41 2021    MONOPCT 7 2021    BASOPCT 1 2021    MONOSABS 0.69 2021    LYMPHSABS 3.83 2021    EOSABS 0.11 2021    MAJO 0.05 07/12/2021    DIFFTYPE NOT REPORTED 07/12/2021     BMP:    Lab Results   Component Value Date     07/12/2021    K 3.3 07/12/2021    CL 99 07/12/2021    CO2 23 07/12/2021    BUN 19 07/12/2021    LABALBU 4.4 07/12/2021    CREATININE 0.53 07/12/2021    CREATININE 0.65 02/04/2021    CALCIUM 9.5 07/12/2021    GFRAA >60 07/12/2021    LABGLOM >60 07/12/2021    GLUCOSE 126 07/12/2021    GLUCOSE 136 02/04/2021       RADIOLOGY:   Narrative   EXAMINATION:   CT OF THE HEAD WITHOUT CONTRAST; CTA OF THE HEAD WITH CONTRAST 7/12/2021 7:35   pm; 7/12/2021 7:36 pm       TECHNIQUE:   CT of the head was performed without the administration of intravenous   contrast. Dose modulation, iterative reconstruction, and/or weight based   adjustment of the mA/kV was utilized to reduce the radiation dose to as low   as reasonably achievable.; CTA of the head/brain was performed with the   administration of intravenous contrast. Multiplanar reformatted images are   provided for review.  MIP images are provided for review.  Dose modulation,   iterative reconstruction, and/or weight based adjustment of the mA/kV was   utilized to reduce the radiation dose to as low as reasonably achievable.       COMPARISON:   July 12, 2021 at 1735 hours       HISTORY:   ORDERING SYSTEM PROVIDED HISTORY: known basal ganglia bleed, transferred   here, eval for progression   TECHNOLOGIST PROVIDED HISTORY:       known basal ganglia bleed, transferred here, eval for progression   Decision Support Exception - unselect if not a suspected or confirmed   emergency medical condition->Emergency Medical Condition (MA)   Reason for Exam: stroke,known bleed   Acuity: Unknown   Type of Exam: Unknown; ORDERING SYSTEM PROVIDED HISTORY: basal ganglia bleed,   eval for vascular malformation   TECHNOLOGIST PROVIDED HISTORY:   basal ganglia bleed, eval for vascular malformation       Decision Support Exception - unselect if not a suspected or confirmed   emergency medical condition->Emergency Medical Condition (MA)   Reason for Exam: stroke,known bleed   Acuity: Unknown   Type of Exam: Unknown       FINDINGS:   :           CT HEAD:       BRAIN/VENTRICLES: Stable appearance to right basal ganglia hematoma unchanged   in size again measured at 2.6 AP by 2.2 cm transverse by 2.0 cm craniocaudal.       ORBITS: The visualized portion of the orbits demonstrate no acute abnormality.       SINUSES:  The visualized paranasal sinuses and mastoid air cells demonstrate   no acute abnormality.       SOFT TISSUES/SKULL: No acute abnormality of the visualized skull or soft   tissues.           CTA HEAD:       ANTERIOR CIRCULATION: No significant stenosis of the intracranial internal   carotid, anterior cerebral, or middle cerebral arteries. No aneurysm.       POSTERIOR CIRCULATION: No significant stenosis of the vertebral, basilar, or   posterior cerebral arteries. No aneurysm.       OTHER: No dural venous sinus thrombosis on this non-dedicated study.         Narrative   EXAMINATION:   CT OF THE HEAD WITHOUT CONTRAST; CTA OF THE HEAD WITH CONTRAST 7/12/2021 7:35   pm; 7/12/2021 7:36 pm       TECHNIQUE:   CT of the head was performed without the administration of intravenous   contrast. Dose modulation, iterative reconstruction, and/or weight based   adjustment of the mA/kV was utilized to reduce the radiation dose to as low   as reasonably achievable.; CTA of the head/brain was performed with the   administration of intravenous contrast. Multiplanar reformatted images are   provided for review.  MIP images are provided for review.  Dose modulation,   iterative reconstruction, and/or weight based adjustment of the mA/kV was   utilized to reduce the radiation dose to as low as reasonably achievable.       COMPARISON:   July 12, 2021 at 1735 hours       HISTORY:   ORDERING SYSTEM PROVIDED HISTORY: known basal ganglia bleed, transferred   here, eval for progression   TECHNOLOGIST PROVIDED HISTORY:     known basal ganglia bleed, transferred here, eval for progression   Decision Support Exception - unselect if not a suspected or confirmed   emergency medical condition->Emergency Medical Condition (MA)   Reason for Exam: stroke,known bleed   Acuity: Unknown   Type of Exam: Unknown; ORDERING SYSTEM PROVIDED HISTORY: basal ganglia bleed,   eval for vascular malformation   TECHNOLOGIST PROVIDED HISTORY:   basal ganglia bleed, eval for vascular malformation       Decision Support Exception - unselect if not a suspected or confirmed   emergency medical condition->Emergency Medical Condition (MA)   Reason for Exam: stroke,known bleed   Acuity: Unknown   Type of Exam: Unknown       FINDINGS:   :           CT HEAD:       BRAIN/VENTRICLES: Stable appearance to right basal ganglia hematoma unchanged   in size again measured at 2.6 AP by 2.2 cm transverse by 2.0 cm craniocaudal.       ORBITS: The visualized portion of the orbits demonstrate no acute abnormality.       SINUSES:  The visualized paranasal sinuses and mastoid air cells demonstrate   no acute abnormality.       SOFT TISSUES/SKULL: No acute abnormality of the visualized skull or soft   tissues.           CTA HEAD:       ANTERIOR CIRCULATION: No significant stenosis of the intracranial internal   carotid, anterior cerebral, or middle cerebral arteries. No aneurysm.       POSTERIOR CIRCULATION: No significant stenosis of the vertebral, basilar, or   posterior cerebral arteries. No aneurysm.       OTHER: No dural venous sinus thrombosis on this non-dedicated study. Labs and Images reviewed with:    [x] Erwin Garcia MD    [] Lukasz Hinton MD  [] Antelmo Pemberton MD  --[] there are no new interval images to review. ASSESSMENT AND PLAN:         ASSESSMENT:     This is a 78 y.o. female with labile right basal ganglia hemorrhage with intraventricular component with blood in the third and fourth ventricles. Patient NIH of 12.   Patient

## 2021-07-13 NOTE — PLAN OF CARE
Problem: Falls - Risk of:  Goal: Will remain free from falls  Description: Will remain free from falls  Outcome: Ongoing  Goal: Absence of physical injury  Description: Absence of physical injury  Outcome: Ongoing     Problem: HEMODYNAMIC STATUS  Goal: Patient has stable vital signs and fluid balance  Outcome: Ongoing     Problem: ACTIVITY INTOLERANCE/IMPAIRED MOBILITY  Goal: Mobility/activity is maintained at optimum level for patient  Outcome: Ongoing     Problem: COMMUNICATION IMPAIRMENT  Goal: Ability to express needs and understand communication  Outcome: Ongoing     Problem: Coping:  Goal: Ability to cope will improve  Description: Ability to cope will improve  Outcome: Ongoing  Goal: Ability to identify appropriate support needs will improve  Description: Ability to identify appropriate support needs will improve  Outcome: Ongoing     Problem: Health Behavior:  Goal: Ability to manage health-related needs will improve  Description: Ability to manage health-related needs will improve  Outcome: Ongoing     Problem: Physical Regulation:  Goal: Signs of adequate cerebral perfusion will increase  Description: Signs of adequate cerebral perfusion will increase  Outcome: Ongoing  Goal: Ability to maintain a stable neurologic state will improve  Description: Ability to maintain a stable neurologic state will improve  Outcome: Ongoing     Problem: Safety:  Goal: Ability to remain free from injury will improve  Description: Ability to remain free from injury will improve  Outcome: Ongoing     Problem: Self-Concept:  Goal: Level of anxiety will decrease  Description: Level of anxiety will decrease  Outcome: Ongoing  Goal: Ability to verbalize feelings about condition will improve  Description: Ability to verbalize feelings about condition will improve  Outcome: Ongoing

## 2021-07-13 NOTE — CONSULTS
Department of Endovascular Neurosurgery                                                                                                                      Resident Consult Note  Stroke Alert paged @7:14 PM  ER Room #12  Arrival to patient bedside @ 7:20        Reason for Consult: Stroke alert  Requesting Physician:  Dr. Walker Jeans:   [x]Dr. Rosario Yanez  []Dr. Salvador Joy  []Dr. Chadd Lyn   []    History Obtained From:  patient, electronic medical record    CHIEF COMPLAINT:       Intraparenchymal hemorrhage     HISTORY OF PRESENT ILLNESS:       The patient is a 78 y.o. female with a history of prior stroke and essential hypertension who presented with headache and left-sided weakness. Patient states that this morning she developed a severe headache which she attributed to one of her migraine headaches along with nausea. She took Excedrin Migraine without much relief. .  At , patient developed left-sided upper and lower extremity weakness and facial droop. She arrived to the to the [facility] emergency room about 30 minutes later where a CT head showed a 2.6 x 2.2 x 2.0 cm intraparenchymal hematoma with intraventricular hemorrhage at the location of the right thalamus. Blood pressure was also elevated and patient was started on a Cardene drip and transferred to [facility] emergency room. Upon arrival, /100, patient is AAO x3 and demonstrates 1 out of 5 strength in left upper and lower extremities and facial droop affecting her lower face on the left. NIH 15. patient also has some sensory deficits affecting her left upper extremity. CT head without contrast is stable and CTA head without contrast is unremarkable. Patient states that she has not been taking her antihypertensive medication for the past few days due to low blood pressure.   She claims to have taken aspirin this morning at 10 AM,  does not take a statin. 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 with unspecified severity (HCC)     Nondisp fx of fourth metatarsal bone of right foot with routine heal     Obstructive sleep apnea     Osteoarthritis     Pain in right foot     Spinal stenosis     Surgical aftercare, neoplasm     Vitamin D deficiency        Past Surgical History:        Procedure Laterality Date    APPENDECTOMY      CHOLECYSTECTOMY      HERNIA REPAIR      HYSTERECTOMY      TONSILLECTOMY      TUBAL LIGATION         Social History:   Social History     Socioeconomic History    Marital status:      Spouse name: Not on file    Number of children: Not on file    Years of education: Not on file    Highest education level: Not on file   Occupational History    Not on file   Tobacco Use    Smoking status: Never Smoker    Smokeless tobacco: Never Used   Substance and Sexual Activity    Alcohol use: Yes    Drug use: Never    Sexual activity: Not Currently   Other Topics Concern    Not on file   Social History Narrative    Not on file     Social Determinants of Health     Financial Resource Strain:     Difficulty of Paying Living Expenses:    Food Insecurity:     Worried About Running Out of Food in the Last Year:     [address] in the Last Year:    Transportation Needs:     Lack of Transportation (Medical):      Lack of Transportation (Non-Medical):    Physical Activity:     Days of Exercise per Week:     Minutes of Exercise per Session:    Stress:     Feeling of Stress :    Social Connections:     Frequency of Communication with Friends and Family:     Frequency of Social Gatherings with Friends and Family:     Attends Hoahaoism Services:     Active Member of Clubs or Organizations:     Attends Club or Organization Meetings:     Marital Status:    Intimate Partner Violence:     Fear of Current or Ex-Partner:     Emotionally Abused:     Physically Abused:     Sexually Abused:        Family History:       Problem Relation Age of Onset    Diabetes Mother     Stroke Father     Heart Disease Father        Allergies:  Clonidine derivatives, Morphine, and Sulfa antibiotics    Home Medications:  Prior to Admission medications    Medication Sig Start Date End Date Taking? Authorizing Provider   cholestyramine (QUESTRAN) 4 GM/DOSE powder TAKE ONE SCOOP DISSOLVED IN A GLASS OF WATER TWICE A DAY AS NEEDED. 5/17/21   CHRISTIANNE Easley CNP   NONFORMULARY Indications: Natural supplement to help with elevated BP STRICTION BP - 2 TABLETS 2 TIMES A DAY    Historical Provider, MD   Aspirin Buf,CaCarb-MgCarb-MgO, (BUFFERIN PO) Take 2 tablets by mouth 4 times daily as needed Per pt    Historical Provider, MD   Melatonin 10 MG TABS Take by mouth nightly as needed    Historical Provider, MD   Omega-3 Fatty Acids (FISH OIL PO)     Historical Provider, MD   losartan-hydroCHLOROthiazide (HYZAAR) 100-25 MG per tablet TAKE ONE TABLET DAILY. 2/12/21   CHRISTIANNE Easley CNP   amLODIPine (NORVASC) 5 MG tablet TAKE ONE TABLET DAILY.  2/12/21   CHRISTIANNE Easley CNP   UNABLE TO FIND CBD Oil per pt report    Historical Provider, MD   CALCIUM-MAGNESIUM-VITAMIN D PO     Historical Provider, MD   Potassium Gluconate 595 (99 K) MG TABS Take by mouth daily    Historical Provider, MD   Flaxmary Linseed, (FLAXSEED OIL PO)     Historical Provider, MD   aspirin-acetaminophen-caffeine ([address] [place] [address]) 030-353-13 MG per tablet Take 2 tablets by mouth daily as needed for Headaches    Historical Provider, MD   Magnesium Oxide 250 MG TABS Take 1 tablet by mouth daily    Historical Provider, MD   ferrous sulfate 324 (65 Fe) MG EC tablet Take 324 mg by mouth daily (with breakfast)    Historical Provider, MD       Current Medications:   Current Facility-Administered Medications: niCARdipine (CARDENE) 20 mg in 0.9 % sodium chloride 200 mL solution, 3-15 mg/hr, Intravenous, Continuous    REVIEW OF SYSTEMS:       CONSTITUTIONAL: negative for fatigue and malaise   EYES: negative for double vision and photophobia    HEENT: negative for tinnitus and sore throat   RESPIRATORY: negative for cough, shortness of breath   CARDIOVASCULAR: negative for chest pain, palpitations   GASTROINTESTINAL: negative for nausea, vomiting   GENITOURINARY: negative for incontinence   MUSCULOSKELETAL: negative for neck or back pain   NEUROLOGICAL: negative for seizures   PSYCHIATRIC: negative for fatigue     Review of systems otherwise negative. PHYSICAL EXAM:       BP (!) 174/100   Pulse 92   Temp 98.4 °F (36.9 °C) (Axillary)   Resp 22   SpO2 95%     CONSTITUTIONAL:  Well developed, well nourished, alert and oriented x 3, in no acute distress. GCS 15, nontoxic. No dysarthria, no aphasia.    HEAD:  normocephalic, atraumatic    EYES:  PERRLA, EOMI.   ENT:  moist mucous membranes   NECK:  supple, symmetric, no midline tenderness to palpation    BACK:  without midline tenderness, step-offs or deformities    LUNGS:  Equal air entry bilaterally   CARDIOVASCULAR:  normal s1 / s2   ABDOMEN:  Soft, no rigidity   NEUROLOGIC:  Mental Status:  A & O x3,awake             Cranial Nerves:    VII: Facial strength: abnormal - left sided facial droop    Motor Exam:    Drift:  present - left arm, left leg  Tone:  normal    Motor exam is 5 out of 5 all extremities with the exception of  1/5 on left UE and LE    Sensory:    Touch:    Right Upper Extremity:  normal  Left Upper Extremity:  abnormal -   Right Lower Extremity:  normal  Left Lower Extremity:  normal    Deep Tendon Reflexes:    Right Bicep:  2+  Left Bicep:  2+  Right Knee:  2+  Left Knee:  2+    Plantar Response:  Right:  downgoing  Left:  downgoing    Clonus:  absent  Maloney's:  absent    Coordination/Dysmetria:  Heel to Shin:  Right:  normal  Left:  normal  Finger to Nose:   Right:  normal  Left:  normal   Dysdiadochokinesia:  absent    Gait:  normal    INITIAL NIH STROKE SCALE:    Time Performed:   750 PM     1a. Level of consciousness:  0 - alert; keenly responsive  1b. Level of consciousness questions:  0 - answers both questions correctly  1c. Level of consciousness questions:  0 - performs both tasks correctly  2. Best Gaze:  0 - normal  3. Visual:  0 - no visual loss  4. Facial Palsy:  2 - partial paralysis (total or near total paralysis of the lower face)  5a. Motor left arm:  4 - no movement  5b. Motor right arm:  0 - no drift, limb holds 90 (or 45) degrees for full 10 seconds  6a. Motor left le - no drift; leg holds 30 degree position for full 5 seconds  6b. Motor right le - no movement  7. Limb Ataxia:  0 - absent  8. Sensory:  1 - mild to moderate sensory loss; patient feels pinprick is less sharp or is dull on the affected side; there is a loss of superficial pain with pinprick but patient is aware of being touched   9. Best Language:  0 - no aphasia, normal  10. Dysarthria:  0 - normal  11.   Extinction and Inattention:  0 - no abnormality    TOTAL:  15     SKIN:  no rash      Modified [place] Score Scale:     [] Zero: No symptoms at all   [] 1: No significant disability despite symptoms; able to carry out all usual duties and activities   [] 2: Slight disability; unable to carry out all previous activities, but able to look after own affairs without assistance   [] 3:Moderate disability; requiring some help, but able to walk without assistance   [x] 4: Moderately severe disability; unable to walk and attend to bodily needs without assistance   [] 5:Severe disability; bedridden, incontinent and requiring constant nursing care and attention    LABS AND IMAGING:     CBC with Differential:    Lab Results   Component Value Date    WBC 9.5 07/12/2021    RBC 4.62 07/12/2021    RBC 4.26 02/04/2021    HGB 14.2 07/12/2021    HCT 42.1 07/12/2021     07/12/2021    MCV 91.1 07/12/2021    MCH 30.7 07/12/2021    MCHC 33.7 07/12/2021    RDW 13.0 07/12/2021    LYMPHOPCT 41 07/12/2021    MONOPCT 7 07/12/2021    BASOPCT 1 07/12/2021    MONOSABS 0.69 07/12/2021    LYMPHSABS 3.83 07/12/2021    EOSABS 0.11 07/12/2021    BASOSABS 0.05 07/12/2021    DIFFTYPE NOT REPORTED 07/12/2021         BMP:    Lab Results   Component Value Date     07/12/2021    K 3.3 07/12/2021    CL 99 07/12/2021    CO2 23 07/12/2021    BUN 19 07/12/2021    LABALBU 4.4 07/12/2021    CREATININE 0.53 07/12/2021    CREATININE 0.65 02/04/2021    CALCIUM 9.5 07/12/2021    GFRAA >60 07/12/2021    LABGLOM >60 07/12/2021    GLUCOSE 126 07/12/2021    GLUCOSE 136 02/04/2021       Radiology Review:    1.) CT brain without contrast: (Reviewed at  2002)  . Stable appearance to right basal ganglia hemorrhage with intraventricular   hemorrhage most likely representing rupture of the basal ganglia hemorrhage   into the ventricular system with asymmetric amount of blood in the right   lateral ventricle compared to the left.  Blood fills the 3rd ventricle and   the sylvian aqueduct is also blood in the 4th ventricle. 2.) CTA Head (Reviewed at 2002)  Unremarkable CTA of the head.          ASSESSMENT AND PLAN:       Patient Active Problem List   Diagnosis    Personal history of other malignant neoplasm of skin    Neoplasm of uncertain behavior of skin    Essential hypertension    Osteoarthritis    Chronic diarrhea    Acute cerebrovascular accident (CVA) [facility])       Assessment                 78 y.o. female who presents with headache and left-sided weakness    1. Last Known Well (date and time): [phone] at 7/12/21    2. Candidate for IV tPA therapy     Yes []     No  [x] due to the following exclusion criteria: Hemorrhage    3. Candidate for Thrombectomy    Yes []      No [] due to the following exclusion criteria: Hemorrhage, no clot     - Discussed with Dr. Tabitha Cody     Recommendations:    [] General Neurology Care Status - prefer 5th floor (5A/5C)   [] Internal Medicine General Care Status   [x] NICU Status - (5B)     [] MICU Status   [] Observation Status    Please use the following admission order set for stroke admission:   [x] [id number] - PARAS Intercerebral Hemorrhage Admission   [] [id number] - PARAS Sub Arachnoid Hemorrhage Admission   [] [id number] - PARAS Ischemic Stroke TPA Treatment Focused   [] [id number] - IP Ischemic Stroke ICU Post Alteplase (TPA) Admission    [] [id number] - GEN Ischemic Stroke Non-Thrombolytic Focussed      Imaging   - CT Head  WO : done   - CTA Head: done   - ECHO  - Carotid US B/L    Medications   - Folic acid 1mg BID  - Atorvastatin 40  mg nightly   -Cardene drip for BP control    Labs  - Fasting Lipid panel  - HgbA1c lab  -Troponin    - Admit to NICU  - Neurosurgery consult  - PT, OT, Speech eval  - Telemetry   - Neuro checks per protocol  - We recommend SBP <160  - Blood glucose goal less than 180  - Please avoid dextrose containing solutions        Additional recommendations may follow    Please contact [facility] with any changes in patients neurologic status. Thank you for your consult.        Shay Haque MD   PGY 2 Neurology Resident  7/12/2021 at 8:21 PM

## 2021-07-13 NOTE — PROGRESS NOTES
Speech Language Pathology  Facility/Department: [facility] NSICU  Initial Speech/Language/Cognitive Assessment    NAME: Rogelio Sol  : 1940   MRN: [id number]  ADMISSION DATE: 2021  ADMITTING DIAGNOSIS: has Personal history of other malignant neoplasm of skin; Neoplasm of uncertain behavior of skin; Essential hypertension; Osteoarthritis; Chronic diarrhea; Acute cerebrovascular accident (CVA) ([facility] [address] Intracranial hemorrhage ([facility] [address] and Hypertensive emergency on their problem list.    Date of Eval: 2021   Evaluating Therapist: Kendra Hurt    RECENT RESULTS  CT OF HEAD/MRI: Stable examination. Primary Complaint: The patient is a 78 y.o. female presented to [facility] emergency department with complaint of a headache. At  patient was noted to have a change in mental status with new left-sided weakness. CT head was obtained at that time which showed IPH with blood entering the third and fourth ventricles. There was also concern at that time for being a right basal ganglia bleed. Patient was auto launched from [facility] facility to [address] [place] [address] ER. In route patient was started on Cardene. Pain:  Pain Assessment  Pain Assessment: 0-10  Pain Type: Acute pain  Pain Location: Head    Assessment:  Pt presents with moderate-severe cognitive-linguistic deficits characterized by impaired automatic speech, responsive naming, word associations, verbal sequencing, antonyms, inductive reasoning, similarities/differences, task insight, thought flexibility, and divergent naming. Immediate/delayed recall and divergent naming EVANGELISTA due to pt. Not responding to these tasks. Pt. Presents with no dysarthria. Pt. With L facial droop at this time with decreased left labial ROM. ST to follow up and provide treatment to address noted deficits. Education provided. Further therapy recommended at discharge.     Recommendations:  Requires SLP Intervention: Yes  Duration/Frequency of Treatment: 3-5x/week  D/C Fair  Individuals consulted  Consulted and agree with results and recommendations: Patient; Family member  Family member consulted: daughter    Education:  Patient Education: yes  Patient Education Response: Verbalizes understanding          Therapy Time:   Individual Concurrent Group Co-treatment   Time In 0920         Time Out 0930         Minutes 10              Completed by: [id number]  Clinician    Cosigned By: Liliana Latif S.CCC/SLP     7/13/2021 11:04 AM

## 2021-07-14 LAB
-: ABNORMAL
ABSOLUTE EOS #: 0.05 K/UL (ref 0–0.44)
ABSOLUTE IMMATURE GRANULOCYTE: 0.03 K/UL (ref 0–0.3)
ABSOLUTE LYMPH #: 1.5 K/UL (ref 1.1–3.7)
ABSOLUTE MONO #: 0.76 K/UL (ref 0.1–1.2)
AMORPHOUS: ABNORMAL
ANION GAP SERPL CALCULATED.3IONS-SCNC: 8 MMOL/L (ref 9–17)
BACTERIA: ABNORMAL
BASOPHILS # BLD: 0 % (ref 0–2)
BASOPHILS ABSOLUTE: 0.04 K/UL (ref 0–0.2)
BILIRUBIN URINE: NEGATIVE
BUN BLDV-MCNC: 14 MG/DL (ref 8–23)
BUN/CREAT BLD: ABNORMAL (ref 9–20)
CALCIUM SERPL-MCNC: 8.5 MG/DL (ref 8.6–10.4)
CASTS UA: ABNORMAL /LPF (ref 0–8)
CHLORIDE BLD-SCNC: 102 MMOL/L (ref 98–107)
CO2: 27 MMOL/L (ref 20–31)
COLOR: YELLOW
COMMENT UA: ABNORMAL
CREAT SERPL-MCNC: 0.67 MG/DL (ref 0.5–0.9)
CRYSTALS, UA: ABNORMAL /HPF
DIFFERENTIAL TYPE: ABNORMAL
EOSINOPHILS RELATIVE PERCENT: 1 % (ref 1–4)
EPITHELIAL CELLS UA: ABNORMAL /HPF (ref 0–5)
GFR AFRICAN AMERICAN: >60 ML/MIN
GFR NON-AFRICAN AMERICAN: >60 ML/MIN
GFR SERPL CREATININE-BSD FRML MDRD: ABNORMAL ML/MIN/{1.73_M2}
GFR SERPL CREATININE-BSD FRML MDRD: ABNORMAL ML/MIN/{1.73_M2}
GLUCOSE BLD-MCNC: 137 MG/DL (ref 70–99)
GLUCOSE URINE: NEGATIVE
HCT VFR BLD CALC: 40.3 % (ref 36.3–47.1)
HEMOGLOBIN: 12.9 G/DL (ref 11.9–15.1)
IMMATURE GRANULOCYTES: 0 %
KETONES, URINE: NEGATIVE
LEUKOCYTE ESTERASE, URINE: ABNORMAL
LYMPHOCYTES # BLD: 14 % (ref 24–43)
MCH RBC QN AUTO: 30.9 PG (ref 25.2–33.5)
MCHC RBC AUTO-ENTMCNC: 32 G/DL (ref 28.4–34.8)
MCV RBC AUTO: 96.6 FL (ref 82.6–102.9)
MONOCYTES # BLD: 7 % (ref 3–12)
MUCUS: ABNORMAL
NITRITE, URINE: NEGATIVE
NRBC AUTOMATED: 0 PER 100 WBC
OTHER OBSERVATIONS UA: ABNORMAL
PDW BLD-RTO: 13.2 % (ref 11.8–14.4)
PH UA: 5 (ref 5–8)
PLATELET # BLD: 392 K/UL (ref 138–453)
PLATELET ESTIMATE: ABNORMAL
PMV BLD AUTO: 10.3 FL (ref 8.1–13.5)
POTASSIUM SERPL-SCNC: 3.5 MMOL/L (ref 3.7–5.3)
PROTEIN UA: NEGATIVE
RBC # BLD: 4.17 M/UL (ref 3.95–5.11)
RBC # BLD: ABNORMAL 10*6/UL
RBC UA: ABNORMAL /HPF (ref 0–4)
RENAL EPITHELIAL, UA: ABNORMAL /HPF
SEG NEUTROPHILS: 77 % (ref 36–65)
SEGMENTED NEUTROPHILS ABSOLUTE COUNT: 8.07 K/UL (ref 1.5–8.1)
SODIUM BLD-SCNC: 137 MMOL/L (ref 135–144)
SPECIFIC GRAVITY UA: 1.01 (ref 1–1.03)
TRICHOMONAS: ABNORMAL
TURBIDITY: CLEAR
URINE HGB: ABNORMAL
UROBILINOGEN, URINE: NORMAL
WBC # BLD: 10.5 K/UL (ref 3.5–11.3)
WBC # BLD: ABNORMAL 10*3/UL
WBC UA: ABNORMAL /HPF (ref 0–5)
YEAST: ABNORMAL

## 2021-07-14 PROCEDURE — 2500000003 HC RX 250 WO HCPCS: Performed by: STUDENT IN AN ORGANIZED HEALTH CARE EDUCATION/TRAINING PROGRAM

## 2021-07-14 PROCEDURE — 2580000003 HC RX 258: Performed by: NURSE PRACTITIONER

## 2021-07-14 PROCEDURE — 2000000003 HC NEURO ICU R&B

## 2021-07-14 PROCEDURE — 99233 SBSQ HOSP IP/OBS HIGH 50: CPT | Performed by: PSYCHIATRY & NEUROLOGY

## 2021-07-14 PROCEDURE — APPNB15 APP NON BILLABLE TIME 0-15 MINS: Performed by: NURSE PRACTITIONER

## 2021-07-14 PROCEDURE — 97167 OT EVAL HIGH COMPLEX 60 MIN: CPT

## 2021-07-14 PROCEDURE — 2580000003 HC RX 258: Performed by: PSYCHIATRY & NEUROLOGY

## 2021-07-14 PROCEDURE — 6370000000 HC RX 637 (ALT 250 FOR IP): Performed by: PSYCHIATRY & NEUROLOGY

## 2021-07-14 PROCEDURE — 6370000000 HC RX 637 (ALT 250 FOR IP): Performed by: NURSE PRACTITIONER

## 2021-07-14 PROCEDURE — 6360000002 HC RX W HCPCS: Performed by: NURSE PRACTITIONER

## 2021-07-14 PROCEDURE — 97535 SELF CARE MNGMENT TRAINING: CPT

## 2021-07-14 PROCEDURE — 97530 THERAPEUTIC ACTIVITIES: CPT

## 2021-07-14 PROCEDURE — 36415 COLL VENOUS BLD VENIPUNCTURE: CPT

## 2021-07-14 PROCEDURE — 81001 URINALYSIS AUTO W/SCOPE: CPT

## 2021-07-14 PROCEDURE — 85025 COMPLETE CBC W/AUTO DIFF WBC: CPT

## 2021-07-14 PROCEDURE — 97112 NEUROMUSCULAR REEDUCATION: CPT

## 2021-07-14 PROCEDURE — 2500000003 HC RX 250 WO HCPCS: Performed by: NURSE PRACTITIONER

## 2021-07-14 PROCEDURE — 6370000000 HC RX 637 (ALT 250 FOR IP): Performed by: STUDENT IN AN ORGANIZED HEALTH CARE EDUCATION/TRAINING PROGRAM

## 2021-07-14 PROCEDURE — 99222 1ST HOSP IP/OBS MODERATE 55: CPT | Performed by: PHYSICAL MEDICINE & REHABILITATION

## 2021-07-14 PROCEDURE — 80048 BASIC METABOLIC PNL TOTAL CA: CPT

## 2021-07-14 PROCEDURE — 6360000002 HC RX W HCPCS: Performed by: STUDENT IN AN ORGANIZED HEALTH CARE EDUCATION/TRAINING PROGRAM

## 2021-07-14 PROCEDURE — 97164 PT RE-EVAL EST PLAN CARE: CPT

## 2021-07-14 RX ORDER — NICARDIPINE HYDROCHLORIDE 0.1 MG/ML
3-15 INJECTION INTRAVENOUS CONTINUOUS
Status: DISCONTINUED | OUTPATIENT
Start: 2021-07-14 | End: 2021-07-15

## 2021-07-14 RX ORDER — DIPHENOXYLATE HYDROCHLORIDE AND ATROPINE SULFATE 2.5; .025 MG/1; MG/1
1 TABLET ORAL 4 TIMES DAILY PRN
Status: DISCONTINUED | OUTPATIENT
Start: 2021-07-14 | End: 2021-07-26 | Stop reason: HOSPADM

## 2021-07-14 RX ORDER — AMLODIPINE BESYLATE 5 MG/1
5 TABLET ORAL ONCE
Status: COMPLETED | OUTPATIENT
Start: 2021-07-14 | End: 2021-07-14

## 2021-07-14 RX ORDER — FAMOTIDINE 20 MG/1
20 TABLET, FILM COATED ORAL 2 TIMES DAILY
Status: DISCONTINUED | OUTPATIENT
Start: 2021-07-14 | End: 2021-07-17

## 2021-07-14 RX ORDER — CEPHALEXIN 500 MG/1
500 CAPSULE ORAL EVERY 6 HOURS SCHEDULED
Status: DISCONTINUED | OUTPATIENT
Start: 2021-07-14 | End: 2021-07-15

## 2021-07-14 RX ORDER — AMLODIPINE BESYLATE 10 MG/1
10 TABLET ORAL DAILY
Status: DISCONTINUED | OUTPATIENT
Start: 2021-07-15 | End: 2021-07-26 | Stop reason: HOSPADM

## 2021-07-14 RX ORDER — POTASSIUM CHLORIDE 20 MEQ/1
40 TABLET, EXTENDED RELEASE ORAL ONCE
Status: COMPLETED | OUTPATIENT
Start: 2021-07-14 | End: 2021-07-14

## 2021-07-14 RX ORDER — CHOLESTYRAMINE LIGHT 4 G/5.7G
4 POWDER, FOR SUSPENSION ORAL 2 TIMES DAILY
Status: DISCONTINUED | OUTPATIENT
Start: 2021-07-14 | End: 2021-07-26 | Stop reason: HOSPADM

## 2021-07-14 RX ORDER — MAGNESIUM SULFATE 1 G/100ML
1000 INJECTION INTRAVENOUS ONCE
Status: COMPLETED | OUTPATIENT
Start: 2021-07-14 | End: 2021-07-14

## 2021-07-14 RX ORDER — DEXAMETHASONE SODIUM PHOSPHATE 4 MG/ML
4 INJECTION, SOLUTION INTRA-ARTICULAR; INTRALESIONAL; INTRAMUSCULAR; INTRAVENOUS; SOFT TISSUE EVERY 8 HOURS
Status: COMPLETED | OUTPATIENT
Start: 2021-07-14 | End: 2021-07-16

## 2021-07-14 RX ORDER — 0.9 % SODIUM CHLORIDE 0.9 %
500 INTRAVENOUS SOLUTION INTRAVENOUS ONCE
Status: COMPLETED | OUTPATIENT
Start: 2021-07-14 | End: 2021-07-14

## 2021-07-14 RX ADMIN — DIPHENHYDRAMINE HCL 25 MG: 25 TABLET ORAL at 00:57

## 2021-07-14 RX ADMIN — Medication 10 MG: at 14:37

## 2021-07-14 RX ADMIN — SODIUM CHLORIDE, PRESERVATIVE FREE 10 ML: 5 INJECTION INTRAVENOUS at 07:46

## 2021-07-14 RX ADMIN — ACETAMINOPHEN 650 MG: 325 TABLET ORAL at 08:47

## 2021-07-14 RX ADMIN — CEPHALEXIN 500 MG: 500 CAPSULE ORAL at 17:28

## 2021-07-14 RX ADMIN — ACETAMINOPHEN 650 MG: 325 TABLET ORAL at 20:05

## 2021-07-14 RX ADMIN — Medication 10 MG: at 10:07

## 2021-07-14 RX ADMIN — MAGNESIUM SULFATE HEPTAHYDRATE 1000 MG: 1 INJECTION, SOLUTION INTRAVENOUS at 10:30

## 2021-07-14 RX ADMIN — LEVETIRACETAM 500 MG: 500 TABLET, FILM COATED ORAL at 08:00

## 2021-07-14 RX ADMIN — SODIUM CHLORIDE, PRESERVATIVE FREE 10 ML: 5 INJECTION INTRAVENOUS at 20:40

## 2021-07-14 RX ADMIN — HYDROCHLOROTHIAZIDE 25 MG: 25 TABLET ORAL at 08:00

## 2021-07-14 RX ADMIN — Medication 10 MG: at 19:07

## 2021-07-14 RX ADMIN — FENTANYL CITRATE 50 MCG: 50 INJECTION, SOLUTION INTRAMUSCULAR; INTRAVENOUS at 00:57

## 2021-07-14 RX ADMIN — Medication 10 MG: at 13:35

## 2021-07-14 RX ADMIN — DEXAMETHASONE SODIUM PHOSPHATE 4 MG: 4 INJECTION, SOLUTION INTRAMUSCULAR; INTRAVENOUS at 17:47

## 2021-07-14 RX ADMIN — ONDANSETRON 4 MG: 2 INJECTION INTRAMUSCULAR; INTRAVENOUS at 00:55

## 2021-07-14 RX ADMIN — POTASSIUM CHLORIDE 40 MEQ: 1500 TABLET, EXTENDED RELEASE ORAL at 10:30

## 2021-07-14 RX ADMIN — CHOLESTYRAMINE 4 G: 4 POWDER, FOR SUSPENSION ORAL at 23:17

## 2021-07-14 RX ADMIN — CEPHALEXIN 500 MG: 500 CAPSULE ORAL at 23:17

## 2021-07-14 RX ADMIN — FAMOTIDINE 20 MG: 20 TABLET, FILM COATED ORAL at 20:39

## 2021-07-14 RX ADMIN — SODIUM CHLORIDE 500 ML: 9 INJECTION, SOLUTION INTRAVENOUS at 10:30

## 2021-07-14 RX ADMIN — LOSARTAN POTASSIUM 100 MG: 50 TABLET, FILM COATED ORAL at 08:00

## 2021-07-14 RX ADMIN — NICARDIPINE HYDROCHLORIDE 5 MG/HR: 0.1 INJECTION INTRAVENOUS at 20:39

## 2021-07-14 RX ADMIN — AMLODIPINE BESYLATE 5 MG: 5 TABLET ORAL at 08:00

## 2021-07-14 RX ADMIN — ENOXAPARIN SODIUM 40 MG: 40 INJECTION SUBCUTANEOUS at 20:39

## 2021-07-14 RX ADMIN — Medication 10 MG: at 23:44

## 2021-07-14 RX ADMIN — AMLODIPINE BESYLATE 5 MG: 5 TABLET ORAL at 15:04

## 2021-07-14 RX ADMIN — CHOLESTYRAMINE 4 G: 4 POWDER, FOR SUSPENSION ORAL at 10:50

## 2021-07-14 RX ADMIN — CAFFEINE AND SODIUM BENZOATE 250 MG: 125 INJECTION, SOLUTION INTRAMUSCULAR; INTRAVENOUS at 11:42

## 2021-07-14 RX ADMIN — ACETAMINOPHEN 650 MG: 325 TABLET ORAL at 14:49

## 2021-07-14 ASSESSMENT — PAIN DESCRIPTION - DESCRIPTORS
DESCRIPTORS: HEADACHE
DESCRIPTORS: CONSTANT;HEADACHE

## 2021-07-14 ASSESSMENT — PAIN SCALES - GENERAL
PAINLEVEL_OUTOF10: 10
PAINLEVEL_OUTOF10: 6
PAINLEVEL_OUTOF10: 6
PAINLEVEL_OUTOF10: 7
PAINLEVEL_OUTOF10: 10
PAINLEVEL_OUTOF10: 8

## 2021-07-14 ASSESSMENT — PAIN DESCRIPTION - PAIN TYPE
TYPE: ACUTE PAIN

## 2021-07-14 ASSESSMENT — PAIN DESCRIPTION - LOCATION
LOCATION: HEAD

## 2021-07-14 ASSESSMENT — PAIN DESCRIPTION - ONSET: ONSET: ON-GOING

## 2021-07-14 ASSESSMENT — PAIN DESCRIPTION - PROGRESSION: CLINICAL_PROGRESSION: NOT CHANGED

## 2021-07-14 ASSESSMENT — PAIN SCALES - WONG BAKER: WONGBAKER_NUMERICALRESPONSE: 6

## 2021-07-14 ASSESSMENT — PAIN DESCRIPTION - FREQUENCY: FREQUENCY: CONTINUOUS

## 2021-07-14 NOTE — CONSULTS
Physical Medicine & Rehabilitation  Consult Note      Admitting Physician:   Clay Carbajal MD    Primary Care Provider:   CHRISTIANNE Chambers CNP     Reason for Consult:  Acute Inpatient Rehabilitation    Chief Complaint: Headache    History of Present Illness:  Referring Provider is requesting an evaluation for appropriate placement upon discharge from acute care. History from chart review and patient and discharge. Jonathon Green is a 78 y.o. RHD female admitted to [facility] on 7/12/2021. Patient admitted from [place] ED with c/o headache and AMS with L sided weakness. CT showed IPH with blood in 3rd and 4th ventricles and possible R BG involvement. She was transferred to [address] [place] [address] for further neuro care and started on Cardene drip. Neurosurgery was consulted. Managing conservatively and monitoring for hydrocephalus. Patient is alert and interactive today with impaired mobility of LUE and LLE. Review of Systems:  Constitutional: negative for anorexia, chills, fatigue, fevers, sweats and weight loss  Eyes: negative for redness and visual disturbance  Ears, nose, mouth, throat, and face: negative for earaches, sore throat and tinnitus  Respiratory: negative for cough and shortness of breath  Cardiovascular: negative for chest pain, dyspnea and palpitations  Gastrointestinal: negative for abdominal pain, change in bowel habits, constipation, nausea and vomiting  Genitourinary:negative for dysuria, frequency, hesitancy and urinary incontinence  Integument/breast: negative for pruritus and rash  Musculoskeletal:negative for stiff joints  Neurological: negative for dizziness, headaches   Behavioral/Psych: negative for decreased appetite, depression and fatigue       Premorbid function:  Independent    Current function:    PT:  Restrictions/Precautions: General Precautions, Up as Tolerated, Seizure  Other position/activity restrictions: SBP <160   Transfers  Sit to Stand:  Moderate suzanne steady at min A ~5 min)  Standing Balance: Moderate assistance (mod A x2)   Standing Balance  Time: ~4 min  Activity: Standing in suzanne steady for brief management  Comment: Heavy L lateral lean     Apparatus Needs  Apparatus Needs: O2  Bed mobility  Rolling to Left: Minimal assistance (HOB 0 degrees)  Rolling to Right: Maximum assistance  Supine to Sit: Maximum assistance (For trunk and leg progression. Education provided on using RLE to facilitate LLE progression during t/f with good return)  Sit to Supine: Maximum assistance  Scooting: Maximal assistance  Comment: Pt supine with HOB elevated ~50 degrees on arrival; retired to bedside recliner  Transfers  Sit to stand: Minimal assistance, 2 Person assistance  Stand to sit: Minimal assistance, 2 Person assistance                 SLP:  Patient presents with probable safe swallow for easy to chew diet with thin liquids as evidenced by no overt s/s of aspiration noted with consistencies tested. Recommend small sips and bites, only feed when alert and awake and upright at 90 degrees for all PO intake. Recommend close monitoring for overt/clinical s/s of aspiration and D/C PO intake and complete Modified Barium Swallow Study should they occur. Results and recommendations reported to RN. Pt presents with moderate-severe cognitive-linguistic deficits characterized by impaired automatic speech, responsive naming, word associations, verbal sequencing, antonyms, inductive reasoning, similarities/differences, task insight, thought flexibility, and divergent naming. Immediate/delayed recall and divergent naming EVANGELISTA due to pt. Not responding to these tasks. Pt. Presents with no dysarthria. Pt. With L facial droop at this time with decreased left labial ROM. ST to follow up and provide treatment to address noted deficits. Education provided. Further therapy recommended at discharge.     Past Medical History:        Diagnosis Date    Anemia, unspecified     Basal cell rail is on)  Home Equipment: Rolling walker, Cane  Ambulation Assistance: Independent  Transfer Assistance: Independent  Active : Yes  Occupation: Retired  Additional Comments: information obtained from daughter at [address] Marital status:      Spouse name: Not on file    Number of children: Not on file    Years of education: Not on file    Highest education level: Not on file   Occupational History    Not on file   Tobacco Use    Smoking status: Never Smoker    Smokeless tobacco: Never Used   Substance and Sexual Activity    Alcohol use: Yes    Drug use: Never    Sexual activity: Not Currently   Other Topics Concern    Not on file   Social History Narrative    Not on file     Social Determinants of Health     Financial Resource Strain:     Difficulty of Paying Living Expenses:    Food Insecurity:     Worried About Running Out of Food in the Last Year:     [address] in the Last Year:    Transportation Needs:     Lack of Transportation (Medical):      Lack of Transportation (Non-Medical):    Physical Activity:     Days of Exercise per Week:     Minutes of Exercise per Session:    Stress:     Feeling of Stress :    Social Connections:     Frequency of Communication with Friends and Family:     Frequency of Social Gatherings with Friends and Family:     Attends Yazdanism Services:     Active Member of Clubs or Organizations:     Attends Club or Organization Meetings:     Marital Status:    Intimate Partner Violence:     Fear of Current or Ex-Partner:     Emotionally Abused:     Physically Abused:     Sexually Abused:        Family History:       Problem Relation Age of Onset    Diabetes Mother     Stroke Father     Heart Disease Father        Diagnostics:    CT BRAIN 7/12/21  FINDINGS:   BRAIN/VENTRICLES: Right basal ganglia hemorrhage 2.6 cm AP x 2.2 cm   transverse by 2.0 cm craniocaudal with hematoma volume estimated 5.7 mL.   There is hemorrhage twitches in the ventricular system most likely secondary   to basal ganglia hemorrhage rupture into the ventricular system is blood   predominantly in the right lateral ventricle 3rd ventricle aqueduct with   blood also noted in the left lateral ventricle to a smaller extent there is   also blood noted in 4th ventricle.       There is no midline shift at this time. [place] is diffuse volume loss with   extensive white matter changes noted bilaterally old infarct in the left   periventricular white matter       No blood is noted in the basal cisterns or the sylvian fissure or middle   cerebral artery cisterns.       ORBITS: The visualized portion of the orbits demonstrate no acute abnormality.       SINUSES: The visualized paranasal sinuses and mastoid air cells demonstrate   no acute abnormality.       SOFT TISSUES/SKULL:  No acute abnormality of the visualized skull or soft   tissues.           Impression   A 2.6 x 2.2 x 2.0 cm intraparenchymal hematoma with intraventricular   hemorrhage most likely representing rupture the basal ganglia hemorrhage into   the right lateral ventricle.  There is some small amount of blood noted in   the left lateral ventricle as well as blood filling the 3rd ventricle in the   aqueduct and in the 4th ventricle.       No midline shift.  Diffuse volume loss is evident. CBC:   Recent Labs     07/12/21 1725 07/13/21 0448 07/14/21  0836   WBC 9.5 9.6 10.5   RBC 4.62 4.30 4.17   HGB 14.2 12.9 12.9   HCT 42.1 39.7 40.3   MCV 91.1 92.3 96.6   RDW 13.0 13.2 13.2    253 392     BMP:   Recent Labs     07/12/21  1725 07/13/21  0448 07/14/21  0836    137 137   K 3.3* 3.8 3.5*   CL 99 100 102   CO2 23 23 27   BUN 19 14 14   CREATININE 0.53 0.63 0.67   GLUCOSE 126* 146* 137*      HbA1c:   Lab Results   Component Value Date    LABA1C 6.3 (H) 07/13/2021     BNP: No results for input(s): BNP in the last 72 hours.   PT/INR:   Recent Labs     07/12/21 1725 07/12/21 2013   PROTIME 13.0 10.8   INR 1.0 1.0     APTT:   Recent Labs     07/12/21  1725 07/12/21 2013   APTT 24.3 21.2     CARDIAC ENZYMES:   Recent Labs     07/12/21  1725 07/12/21 2013 07/12/21 2059   TROPONINT NOT REPORTED NOT REPORTED NOT REPORTED     FASTING LIPID PANEL:  Lab Results   Component Value Date    CHOL 144 07/13/2021    HDL 49 07/13/2021    TRIG 194 (H) 07/13/2021     LIVER PROFILE:   Recent Labs     07/12/21  1725   AST 27   ALT 35*   BILITOT 0.34   ALKPHOS 86          Physical Exam:  BP (!) 151/68   Pulse 73   Temp 98.6 °F (37 °C) (Oral)   Resp 16   SpO2 98%     GEN: Well developed, well nourished, no acute distress. HEENT: NCAT, PERRL, EOMI, mucous membranes pink and moist.  RESP: Lungs clear to auscultation. No rales or rhonchi. Respirations WNL and unlabored. CV: Regular rate rhythm. No murmurs, rubs, or gallops. ABD: soft, non-distended, non-tender. BS+ and equal.  NEURO: A&O x 3. Sensation intact to light touch. DTRs 2+ RUE and RLE. Impaired L CN VII.   MSK: Functional ROM RUE and RLE. Muscle tone and bulk are normal bilaterally. Strength 4+/5 key muscles RUE and RLE. L shoulder shrug 2/5, L elbow flexion 1/5. L  0/5. L hip flexion and knee extension 2/5. EXT: No calf tenderness to palpation or edema BLEs. SKIN: Warm dry and intact with good turgor. PSYCH: Mood WNL. Affect WNL. Appropriately interactive. Impression:  1. Hemorrhagic CVA with L hemiparesis  2. HTN  3. WALLACE  4. Anemia  5. Depression  6. Spinal stenosis  7. Vitamin D deficiency    Recommendations:    1. Diagnosis:  Hemorrhagic CVA  2. Therapy: Has PT/OT/SLP needs  3. Medical Necessity: As above  4. Support: Has supportive daughter who lives 2 hours away  5. Rehab Recommendation: Lengthy discussion with patient and daughter today about levels of inpatient rehabilitation.  Patient has minimal assistance available in her senior living apartment at [address] [place] [address] - would recommend SNF level rehabilitation there where she can easily transition back to her apartment when able. She would not likely achieve level of independence for apartment in time typically allowed for acute rehab. 6. DVT Prophylaxis: No current chemoprophylaxis - contraindicated for hemorrhagic CVA    It was my pleasure to evaluate Sae Matos today. Please call with questions. Abhishek Nichole MD        This note is created with the assistance of a speech recognition program.  While intending to generate a document that actually reflects the content of the visit, the document can still have some errors including those of syntax and sound a like substitutions which may escape proof reading. In such instances, actual meaning can be extrapolated by contextual diversion.

## 2021-07-14 NOTE — PROGRESS NOTES
Physical Therapy    Facility/Department: [address]  Reassessment    NAME: Allen Hays  : 1940  MRN: [id number]    Date of Service: 2021  The patient is a 78 y. o. female presented to [facility] emergency department with complaint of a headache.  At 1530 patient was noted to have a change in mental status with new left-sided weakness.  CT head was obtained at that time which showed IPH with blood entering the third and fourth ventricles. Discharge Recommendations:    Further therapy recommended at discharge. PT Equipment Recommendations  Equipment Needed: No    Assessment    Pt sitting EOB w/ OT when PT arrived. Pt hypertensive with 177/85 BP in sitting EOB, then BP retaken ~2 min later and was 171/94. RN okayed to transfer to chair. Pt /72 after transfer to chair. Pt modA+2 transfer to chair via [facility] stedy. Pt modA+2 STS and stand to sit. Pt presented w/ L posterior and lateral lean in sitting and standing. Pt required maxA to correct posture. Pt still has trouble finding LUE w/ RUE w/o assistance. Pt states her arm still feels asleep. Pt would benefit from further therapy at this time in order to address deficits. Body structures, Functions, Activity limitations: Decreased functional mobility ; Decreased strength;Decreased safe awareness;Decreased balance;Decreased sensation;Decreased endurance;Decreased ROM  Prognosis: Fair  Decision Making: High Complexity  PT Education: Goals;PT Role;Plan of Care;General Safety;Precautions;Transfer Training  REQUIRES PT FOLLOW UP: Yes  Activity Tolerance  Activity Tolerance: Patient Tolerated treatment well;Patient limited by endurance; Patient limited by fatigue       Patient Diagnosis(es): The encounter diagnosis was Intracranial hemorrhage ([address]      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 has a past surgical history that includes Appendectomy; Cholecystectomy; hernia repair; Tonsillectomy; Tubal ligation; and Hysterectomy. Restrictions  Restrictions/Precautions  Restrictions/Precautions: General Precautions, Up as Tolerated, Seizure  Required Braces or Orthoses?: No  Vision/Hearing  Vision: Impaired  Vision Exceptions: Wears glasses at all times  Hearing: Exceptions to [facility]  Hearing Exceptions: Bilateral hearing aid;Hard of hearing/hearing concerns     Subjective  BP: 177/85 sitting EOB; 171/94 sitting EOB ~2 min later; 164/72 sitting in chair after transfer.   General  Patient assessed for rehabilitation services?: Yes  Response To Previous Treatment: Not applicable  Family / Caregiver Present: Yes (daughter)  Follows Commands: Within Functional Limits  Pain Screening  Patient Currently in Pain: Yes  Pain Assessment  Pain Assessment: 0-10  Pain Level: 7  Pain Type: Acute pain  Pain Location: Head  Pain Descriptors: Headache  Vital Signs  Patient Currently in Pain: Yes  Pre Treatment Pain Screening  Intervention List: Patient able to continue with treatment    Orientation  Orientation  Overall Orientation Status: Within Functional Limits  Social/Functional History  Social/Functional History  Lives With: Alone  Type of Home: Apartment  Home Layout: One level  Home Access: Stairs to enter with rails  Entrance Stairs - Number of Steps: 3-4  Entrance Stairs - Rails:  (daughter unsure of which side rail is on)  Bathroom Shower/Tub: Walk-in shower  Bathroom Toilet: Handicap height  Bathroom Equipment: Grab bars in shower, Grab bars around toilet  Home Equipment: Rolling walker, Cane  ADL Assistance: Independent  Homemaking Assistance: in place: No    AM-PAC Score  AM-PAC Inpatient Mobility Raw Score : 11 (07/14/21 1003)  AM-PAC Inpatient T-Scale Score : 33.86 (07/14/21 1003)  Mobility Inpatient CMS 0-100% Score: 72.57 (07/14/21 1003)  Mobility Inpatient CMS G-Code Modifier : CL (07/14/21 1003)          Goals  Short term goals  Time Frame for Short term goals: 12 visits  Short term goal 1: Perform bed mob Ariella+1  Short term goal 2: Perform transfers modA+1  Short term goal 3: Ambulate 25' modA+2 w/ appropriate device  Short term goal 4: Dangle EOB ~15 min SBA  Short term goal 5: Facilitate active movement LUE/LLE  Patient Goals   Patient goals : Go home       Therapy Time   Individual Concurrent Group Co-treatment   Time In 0920         Time Out 0938         Minutes 18         Timed Code Treatment Minutes: 8 Minutes       JESSICA De La Vega    This treatment/evaluation completed by signing SPT. Signing PT agrees with treatment and documentation.

## 2021-07-14 NOTE — PROGRESS NOTES
Occupational Therapy   Occupational Therapy Initial Assessment  Date: 2021   Patient Name: Nola Hallman  MRN: [id number]     : 1940    Date of Service: 2021   Obtained from medical chart:   \"Ansley Denney is a 78 y.o. female with past medical history hypertension, hyperlipidemia, CVA who presents as a transfer from [facility] due to intracranial hemorrhage. Patient initially presented to to the emergency department with complaints of headache, inability to move her left arm and inability to move her left leg. Last known well approximately 1545 today. Patient states she was getting ready to get up and use the bathroom however was unable to move her left leg and felt like her left upper and left lower extremities were \"asleep \". Per chart review patient does have history of CVA with previous hemiparesis of her left upper and lower extremities however she did regain function. Blood pressure found to be 314 systolic at [facility] emergency department, patient was started on Cardene and transferred to [facility]. Patient arrives alert and oriented to person, place, time. She does endorse left-sided weakness in her left arm and left lower extremity. \"    Discharge Recommendations: Further therapy recommended at discharge. Further therapy recommended at discharge. The patient should be able to tolerate at least three hours of therapy per day over 5 days or 15 hours over 7 days. OT Equipment Recommendations  Equipment Needed: Yes  Mobility Devices: ADL Assistive Devices  ADL Assistive Devices: Shower Chair with back    Assessment   Performance deficits / Impairments: Decreased functional mobility ; Decreased endurance;Decreased coordination;Decreased ADL status; Decreased sensation;Decreased posture;Decreased ROM; Decreased balance;Decreased strength;Decreased vision/visual deficit; Decreased safe awareness;Decreased high-level IADLs;Decreased fine motor control;Decreased cognition  Assessment: Pt presents with L neglect and hemiparesis. Pt engaged in rolling at min A to complete toileting in bed pan d/t urgency. Pt completed supine>sit t/f at max A and demo'd poor static sitting balance at EOB d/t heavy L lateral lean. OT facilitated LB dressing task at EOB; pt req min A for trunk support progressing to mod A when reaching with R UE. Pt req SBA to thread R sock and max A to don L sock. OT educated on adaptive dressing technique with fair return. Pt t/f into suzanne steady at mod A x2 and req hand over hand assistance to keep L hand on grab bar. Req mod A x2 for standing balance in suzanne steady during brief management. OT facilitated functional transfer to bedside recliner at mod A x2. Pt engaged in ADL tasks seated supported in bedside recliner at mod A; pt demo'd difficulty identifying and retrieving items on L side and req max VC to scan. Hand over hand assistance provided to hold washcloth and mouthwash in L hand for proprioceptive input. During beverage management, pt demo'd difficulty bringing straw to mouth and inability to suck straw on L side of mouth; req min A to reposition straw. Pt retired to bedside recliner with chair alarm on, call light in reach, and nursing notified. Prognosis: Good  Decision Making: High Complexity  Patient Education: OT role, OT POC, purpose of eval, importance of OOB activity, EC/WS strategies, ADL adaptive dressing techniques, visual scanning compensatory strategies, transfer training, and incorporation of L hand during functional tasks - fair return  Barriers to Learning: Decreased cognition  REQUIRES OT FOLLOW UP: Yes  Activity Tolerance  Activity Tolerance: Patient Tolerated treatment well;Patient limited by fatigue;Patient limited by pain;Treatment limited secondary to medical complications (free text)  Activity Tolerance: Increased BP  Safety Devices  Safety Devices in place: Yes  Type of devices: Left in chair;Call light within reach;Nurse notified; Chair alarm in place;Gait belt  Restraints  Initially in place: Yes  Restraints: Seizure precautions           Patient Diagnosis(es): The encounter diagnosis was Intracranial hemorrhage ([facility] [address] 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 has a past surgical history that includes Appendectomy; Cholecystectomy; hernia repair; Tonsillectomy; Tubal ligation; and Hysterectomy. Restrictions  Restrictions/Precautions  Restrictions/Precautions: General Precautions, Up as Tolerated, Seizure  Required Braces or Orthoses?: No  Position Activity Restriction  Other position/activity restrictions: SBP <160    Subjective   General  Patient assessed for rehabilitation services?: Yes  Family / Caregiver Present: Yes (daughter)  General Comment  Comments: RN ok'd patient for OT evaluation. PT present for transfers tasks only. Pt cooperative and participatory throughout. BP  146/84 prior to functional activities, ~172/84 seated EOB (RN ok'd continuing session), 157/90 at end of session.   Vital Signs  O2 Device: Nasal cannula  Skin Assessment: Clean, dry, & intact  O2 Flow Rate (L/min): 2 L/min    Social/Functional History  Social/Functional History  Lives With: Alone  Type of Home: Apartment  Home Layout: One level  Home Access: Stairs to enter with rails  Entrance Stairs - Number of Steps: 3-4  Entrance Stairs - Rails:  (daughter unsure of which side rail is on)  Bathroom Shower/Tub: Walk-in shower  Bathroom Toilet: Handicap height  Bathroom Equipment: Grab bars in shower, Grab bars around toilet  Home Equipment: Rolling walker, Isha Henry  ADL Assistance: Independent  Homemaking Assistance: Independent  Homemaking Responsibilities: Yes  Meal Prep Responsibility: Primary  Laundry Responsibility: Primary  Cleaning Responsibility: Primary  Shopping Responsibility: Primary  Ambulation Assistance: Independent  Transfer Assistance: Independent  Active : Yes  Occupation: Retired  Type of occupation: Teacher  Leisure & Hobbies: Quilting  Additional Comments: information obtained from daughter at bedside       Objective   Vision: Impaired  Vision Exceptions: Wears glasses at all times; Visual field cut (L neglect)  Hearing: Exceptions to [facility]  Hearing Exceptions: Bilateral hearing aid;Hard of hearing/hearing concerns    Orientation  Overall Orientation Status: Within Functional Limits  Observation/Palpation  Posture: Poor  Balance  Sitting Balance: Minimal assistance (Dynamic sitting EOB to don socks ~8-9 min at min A progressing to mod A reaching outside RAMON with RUE. Seated supported in recliner during feeding activity ~35 min at SBA; seated in suzanne steady ~5 min at min A. Demo'd heavy L lateral lean throughout)  Standing Balance: Moderate assistance (mod A x2)  Standing Balance  Time: ~4 min  Activity: Standing in suzanne steady for brief management  Comment: Heavy L lateral lean    ADL  Feeding: Minimal assistance;Setup;Verbal cueing; Increased time to complete (Pt engaged in feeding seated supported in bedside recliner. Demo'd difficulty locating items on left side and req max VC to visually scan for items. Demo'd difficulty sucking straw on L side of mouth and req min A to reposition straw to R side.)  Grooming: Verbal cueing;Setup; Increased time to complete; Moderate assistance (Pt washed face with L hand at max A with hand over hand assistance; demo'd ability to wash face using R hand at mod I. Pt completed oral care at mod A to hold mouthwash in L hand, locate/manage cap, and use sponge.)  UE Bathing: Setup;Verbal cueing; Moderate assistance; Increased time to complete  LE Bathing: Setup;Verbal cueing; Increased time to complete;Maximum assistance  UE Dressing: Setup;Verbal cueing; Increased time to complete; Moderate assistance  LE Dressing: Setup;Verbal cueing; Increased time to complete;Maximum assistance (Pt threaded R sock at SBA, req max A for L sock. Education provided on adaptive dressing techniques with fair return)  Toileting: Setup; Increased time to complete;Maximum assistance (Pt unable to complete unilateral hand release d/t balance deficits, req max A for brief management and anirudh care standing in suzanne steady)  Additional Comments: Pt lethargic this date with difficulty keeping eyes open    Tone RUE  RUE Tone: Normotonic  Tone LUE  LUE Tone: Normotonic  Tone Description: Slight increase in tone  Coordination  Movements Are Fluid And Coordinated: No  Coordination and Movement description: Fine motor impairments;Decreased speed;Decreased accuracy; Right UE;Left UE (L>R)     Bed mobility  Rolling to Left: Minimal assistance (HOB 0 degrees)  Supine to Sit: Maximum assistance (For trunk and leg progression. Education provided on using RLE to facilitate LLE progression during t/f with good return)  Comment: Pt supine with HOB elevated ~50 degrees on arrival; retired to bedside recliner  Transfers  Sit to stand: 2 Person assistance; Moderate assistance  Stand to sit: 2 Person assistance; Moderate assistance     Cognition  Overall Cognitive Status: Exceptions  Arousal/Alertness: Delayed responses to stimuli  Following Commands: Follows one step commands with increased time  Attention Span: Difficulty attending to directions; Attends with cues to redirect  Safety Judgement: Decreased awareness of need for assistance;Decreased awareness of need for safety  Problem Solving: Assistance required to generate solutions;Assistance required to implement solutions;Decreased awareness of errors  Insights: Decreased awareness of deficits  Initiation: Requires cues for some  Sequencing: Requires cues for some  Cognition Comment: Pt very lethargic with difficulty keeping eyes open.  L sided neglect noted, requiring increased time and cueing to attend to items on L side        Sensation  Overall Sensation Status: Impaired (pt denies numbess/tingling; overall decreased sensation on L side)        LUE PROM: WFL  LUE AROM : Exceptions  LUE General AROM: Unable to complete AROM of LUE  Left Hand PROM: WFL  Left Hand AROM: Exceptions  Left Hand General AROM: Unable to complete AROM of L hand  RUE AROM : WFL  Right Hand AROM: WFL  LUE Strength  L Hand General: 0/5  LUE Strength Comment: No contractions felt in LUE muscles  RUE Strength  Gross RUE Strength: WFL  R Hand General: 4/5  RUE Strength Comment: RUE grossly 4/5                   Plan   Plan  Times per week: 4-5x/wk  Current Treatment Recommendations: Strengthening, Patient/Caregiver Education & Training, Home Management Training, ROM, Equipment Evaluation, Education, & procurement, Balance Training, Neuromuscular Re-education, Functional Mobility Training, Positioning, Endurance Training, Cognitive/Perceptual Training, Safety Education & Training, Self-Care / ADL    AM-PAC Score        AM-[facility] Inpatient Daily Activity Raw Score: 13 (07/14/21 1341)  AM-PAC Inpatient ADL T-Scale Score : 32.03 (07/14/21 1341)  ADL Inpatient CMS 0-100% Score: 63.03 (07/14/21 1341)  ADL Inpatient CMS G-Code Modifier : CL (07/14/21 1341)    Goals  Short term goals  Time Frame for Short term goals: Patient will, by discharge:  Short term goal 1: demo UB ADLs at min A with <3 VC  Short term goal 2: demo LB ADLs at mod A with <3 VC  Short term goal 3: locate supplies on L side with <2 VC to visually scan to increase participation in ADL tasks  Short term goal 4: demo safety awareness with <3 VC to increase participation in functional tasks  Short term goal 5: incorporate LUE during ADL tasks with mod A to increase functional ROM and strength  Short term goal 6: demo functional transfers/mobility at mod A to increase participation in functional tasks  Short term goal 7: demo 10+ minutes of proprioceptive input to the L extremities to facilitate neuromuscular re-education to improve functional use       Therapy Time   Individual Concurrent Group Co-treatment   Time In 0855         Time Out 0956         Minutes 61         Timed Code Treatment Minutes: [address]  S/OT

## 2021-07-14 NOTE — PLAN OF CARE
Problem: Falls - Risk of:  Goal: Will remain free from falls  Description: Will remain free from falls  7/14/2021 1607 by Victoriano Campoverde RN  Outcome: Met This Shift  7/14/2021 0620 by Marissa Green RN  Outcome: Ongoing     Problem: COMMUNICATION IMPAIRMENT  Goal: Ability to express needs and understand communication  7/14/2021 1607 by Victoriano Campoverde RN  Outcome: Met This Shift  7/14/2021 0620 by Marissa Green RN  Outcome: Ongoing     Problem: Falls - Risk of:  Goal: Absence of physical injury  Description: Absence of physical injury  7/14/2021 0620 by Marissa Green RN  Outcome: Ongoing     Problem: HEMODYNAMIC STATUS  Goal: Patient has stable vital signs and fluid balance  7/14/2021 0620 by Marissa Green RN  Outcome: Ongoing     Problem: ACTIVITY INTOLERANCE/IMPAIRED MOBILITY  Goal: Mobility/activity is maintained at optimum level for patient  7/14/2021 1607 by Victoriano Campoverde RN  Outcome: Ongoing  7/14/2021 0620 by Marissa Green RN  Outcome: Ongoing     Problem: Coping:  Goal: Ability to cope will improve  Description: Ability to cope will improve  7/14/2021 0620 by Marissa Green RN  Outcome: Ongoing  Goal: Ability to identify appropriate support needs will improve  Description: Ability to identify appropriate support needs will improve  7/14/2021 0620 by Marissa Green RN  Outcome: Ongoing     Problem: Health Behavior:  Goal: Ability to manage health-related needs will improve  Description: Ability to manage health-related needs will improve  7/14/2021 0620 by Marissa Green RN  Outcome: Ongoing     Problem: Physical Regulation:  Goal: Signs of adequate cerebral perfusion will increase  Description: Signs of adequate cerebral perfusion will increase  7/14/2021 0620 by Marissa Green RN  Outcome: Ongoing  Goal: Ability to maintain a stable neurologic state will improve  Description: Ability to maintain a stable neurologic state will improve  7/14/2021 0620 by Marissa Green RN  Outcome: Ongoing     Problem: Safety:  Goal: Ability to remain free from injury will improve  Description: Ability to remain free from injury will improve  7/14/2021 0620 by Willem Turner RN  Outcome: Ongoing     Problem: Self-Concept:  Goal: Level of anxiety will decrease  Description: Level of anxiety will decrease  7/14/2021 1607 by Vickie Mullins RN  Outcome: Ongoing  7/14/2021 0620 by Willem Turner RN  Outcome: Ongoing  Goal: Ability to verbalize feelings about condition will improve  Description: Ability to verbalize feelings about condition will improve  7/14/2021 0620 by Willem Turner RN  Outcome: Ongoing     Problem: Pain:  Goal: Pain level will decrease  Description: Pain level will decrease  7/14/2021 0620 by Willem Turner RN  Outcome: Ongoing  Goal: Control of acute pain  Description: Control of acute pain  7/14/2021 1607 by Vickie Mullins RN  Outcome: Ongoing  7/14/2021 0620 by Willem Turner RN  Outcome: Ongoing  Goal: Control of chronic pain  Description: Control of chronic pain  7/14/2021 0620 by Willem Turner RN  Outcome: Ongoing

## 2021-07-14 NOTE — PROGRESS NOTES
Daily Progress Note  Neuro Critical Care    Patient Name: Jah Tinajero  Patient : 1940  Room/Bed: 7220/2016-86  Code Status: Full  Allergies: Allergies   Allergen Reactions    Clonidine Derivatives     Morphine     Sulfa Antibiotics        CHIEF COMPLAINT:      ICH     INTERVAL HISTORY    Initial Presentation (Admitted 21): The patient is a 78 y. o. female with a history of HTN, HLD and CVA () who presented as a transfer from [address] with a right basal ganglia IPH. Patient initially presented to [facility] ED with acute onset left sided weakness. LKW around 1700, about 30 minutes prior to arrival at [facility]. Initial . Patient had stopped taking her antihypertensive medications 3 days prior. NIH 12.  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 Started on Cardene infusion for BP control. Transferred to [facility] for further evaluation and management. On arrival to [facility] ED, NIH 11. On Cardene infusion for BP control. CT Head stable. CTA Head/Neck unremarkable. Neurosurgery consulted. Admitted to Neuro ICU for close monitoring. Hospital Course:   : CT head stable. Home oral antihypertensives restarted. Febrile, UA sent. Last 24h:   No acute events overnight. Cardene infusion weaned off around 0300. Norvasc increased to 10 mg QD, remains off Cardene infusion. Discontinue Keppra, monitor off AEDs. Patient complaining of headache this morning, received Magnesium, caffeine and fluid bolus with some improvement. Headache continued this afternoon, will start Decadron 4mg q8h. UA consistent with UTI, Keflex started, follow culture. PM&R consult.      CURRENT MEDICATIONS:  SCHEDULED MEDICATIONS:   amLODIPine  5 mg Oral Daily    levETIRAcetam  500 mg Oral BID    losartan  100 mg Oral Daily    And    hydroCHLOROthiazide  25 mg Oral Daily    sodium chloride flush  5-40 mL Intravenous 2 times per day     CONTINUOUS INFUSIONS:   sodium chloride       PRN MEDICATIONS:   labetalol, sodium chloride flush, sodium chloride, acetaminophen, ondansetron **OR** ondansetron    VITALS:  Temperature Range: Temp: 97.7 °F (36.5 °C) Temp  Av.6 °F (37 °C)  Min: 97.7 °F (36.5 °C)  Max: 99.5 °F (37.5 °C)  BP Range: Systolic (92AKR), SGL:007 , Min:94 , UMT:957     Diastolic (34THB), IIJ:29, Min:42, Max:131    Pulse Range: Pulse  Av.9  Min: 70  Max: 107  Respiration Range: Resp  Av.1  Min: 14  Max: 27  Current Pulse Ox: SpO2: 96 %  24HR Pulse Ox Range: SpO2  Av.6 %  Min: 90 %  Max: 98 %  Patient Vitals for the past 12 hrs:   BP Temp Temp src Pulse Resp SpO2   21 0800 (!) 164/63 97.7 °F (36.5 °C) Oral 75 16 96 %   21 0700 (!) 157/82   94 22 94 %   21 0615 126/74   74 18    21 0600 (!) 145/58   75 18 97 %   21 0545 (!) 132/111   97  96 %   21 0530 (!) 184/90   97 27 96 %   21 0515 (!) 130/58   77 21 95 %   21 0500 139/75   89 25 96 %   21 0445 (!) 157/76   96 20 96 %   21 0430 136/71   70 14 94 %   21 0415 (!) 123/47   70 17 94 %   21 0400 (!) 112/53 98.3 °F (36.8 °C) Oral 73 17 93 %   21 0345 121/60   75 18 95 %   21 0330 132/64   78 18 96 %   21 0315 (!) 149/61   93 14 94 %   21 0300 (!) 151/75   89 17 94 %   21 0245 (!) 174/81   95 22 94 %   21 0230 (!) 151/67   85 23 94 %   21 0215 119/67   78 16 96 %   21 0200 (!) 108/53        21 0145 108/64   74 16 95 %   21 0130 123/63   82 18 95 %   21 0115 129/62   82 17 95 %   21 0100 (!) 145/63   90 18 95 %   21 0045 117/79   78 17 95 %   21 0030 (!) 125/46   79 16 95 %   21 0015 (!) 120/48   77 17 95 %   21 0000 (!) 119/57 98 °F (36.7 °C) Oral 77 17 96 %   21 2345 (!) 142/69   96 15 90 %   21 2330 130/60   81 18 94 %   21 2315 (!) 105/48   72 18 92 %   07/13/21 2300 (!) 125/44   74 18 93 %   07/13/21 2245 102/72   75 17 94 %   07/13/21 2230 94/60   76 18 93 %   07/13/21 2215 (!) 96/44   77 17 95 %   07/13/21 2200 (!) 139/57   85 19 96 %   07/13/21 2145 (!) 127/58   77 17 95 %   07/13/21 2130 (!) 161/131   90 23 94 %   07/13/21 2115 (!) 153/61   107 19 93 %   07/13/21 2100 (!) 103/45   82 21 92 %   07/13/21 2045 (!) 107/49   80 18 94 %   07/13/21 2030 (!) 106/42   81 17 94 %     Estimated body mass index is 26.49 kg/m² as calculated from the following:    Height as of 2/23/21: 4' 11.5\" (1.511 m). Weight as of 4/6/21: 133 lb 6.4 oz (60.5 kg).  []<16 Severe malnutrition  []1616.99 Moderate malnutrition  []1718.49 Mild malnutrition  []18.524.9 Normal  []2529.9 Overweight (not obese)  []3034.9 Obese class 1 (Low Risk)  []3539.9 Obese class 2 (Moderate Risk)  []?40 Obese class 3 (High Risk)    RECENT LABS:   Lab Results   Component Value Date    WBC 9.6 07/13/2021    HGB 12.9 07/13/2021    HCT 39.7 07/13/2021     07/13/2021    CHOL 144 07/13/2021    TRIG 194 (H) 07/13/2021    HDL 49 07/13/2021    ALT 35 (H) 07/12/2021    AST 27 07/12/2021     07/13/2021    K 3.8 07/13/2021     07/13/2021    CREATININE 0.63 07/13/2021    BUN 14 07/13/2021    CO2 23 07/13/2021    TSH 1.350 02/04/2021    INR 1.0 07/12/2021    LABA1C 6.3 (H) 07/13/2021     24 HOUR INTAKE/OUTPUT:    Intake/Output Summary (Last 24 hours) at 7/14/2021 3685  Last data filed at 7/14/2021 0000  Gross per 24 hour   Intake 530 ml   Output 250 ml   Net 280 ml       IMAGING:   XR CHEST PORTABLE   Final Result   1. Prominence of the right paratracheal stripe which could be positional.   If clinically indicated, this could be further evaluated with a   contrast-enhanced CT chest.      2.  Mild interstitial prominence in the lungs, possibly chronic interstitial   change versus edema versus pneumonitis.          CT head without contrast   Final Result Stable examination. CTA HEAD W CONTRAST   Final Result   1. Stable appearance to right basal ganglia hemorrhage with intraventricular   hemorrhage most likely representing rupture of the basal ganglia hemorrhage   into the ventricular system with asymmetric amount of blood in the right   lateral ventricle compared to the left. Blood fills the 3rd ventricle and   the sylvian aqueduct is also blood in the 4th ventricle. 2. Unremarkable CTA of the head. Findings were discussed with Dr. Jennifer Arias at 7:58 pm on 7/12/2021. CT HEAD WO CONTRAST   Final Result   1. Stable appearance to right basal ganglia hemorrhage with intraventricular   hemorrhage most likely representing rupture of the basal ganglia hemorrhage   into the ventricular system with asymmetric amount of blood in the right   lateral ventricle compared to the left. Blood fills the 3rd ventricle and   the sylvian aqueduct is also blood in the 4th ventricle. 2. Unremarkable CTA of the head. Findings were discussed with Dr. Jennifer Arias at 7:58 pm on 7/12/2021. Labs and Images reviewed with:  [x] Dr. Chaya Lehman. Evelina    [] Dr. Coreen Wooten  [] Dr. Ana Sandoval  [] There are no new interval images to review. PHYSICAL EXAM       CONSTITUTIONAL:  Alert and oriented x 3, in no acute distress. GCS 15. Nontoxic. Mild dysarthria. No aphasia.    HEAD:  normocephalic, atraumatic    EYES:  PERRLA, EOMI.   ENT:  moist mucous membranes   NECK:  supple, symmetric   LUNGS:  Equal air entry bilaterally, clear   CARDIOVASCULAR:  normal s1 / s2, RRR, distal pulses intact   ABDOMEN:  Soft, no rigidity   NEUROLOGIC:  Mental Status:  A & O x3,awake             Cranial Nerves:    II: Visual fields:  normal  III: Pupils:  equal, round, reactive to light  III,IV,VI: Extra Ocular Movements: intact  V: Facial sensation:  intact  VII: Facial strength: abnormal - left facial droop     Motor Exam:    Drift:  present - left upper and lower extremities  Tone:  Decreased left upper extremity     5/5 strength in the right upper and right lower extremities  0/5 strength in the left upper extremity, no movement to pain. Withdraws to pain in the left lower extremity        Sensory:    Touch:    Right Upper Extremity:  normal  Left Upper Extremity:  abnormal - decreased  Right Lower Extremity:  normal  Left Lower Extremity:  abnormal - decreased       DRAINS:  [x] There are no drains for Neuro Critical Care to monitor at this time. ASSESSMENT AND PLAN:       The patient is a 79 yo female with a history of  HTN, HLD and CVA (2004) who presented as a transfer from [facility] with a right basal ganglia IPH. initially presented to [facility] ED with acute onset left sided weakness. Found to be hypertensive with .   CT Head revealed right basal ganglia IPH with intraventricular extension.       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  - Etiology likely hypertension  - CTA Head/Neck unremarkable  - Repeat CT Head 7/13 stable  - Neurosurgery following, no surgical intervention  - Keppra discontinued  - Goal SBP<160  - Neuro checks per protocol     CARDIOVASCULAR:  - Goal SBP<160  - PRN Labetalol  - Essential HTN  - Resume home Losartan-HCTZ 100-25mg daily, Increase Norvasc 10mg QD  - Troponin 11, EKG sinus rhythm, left anterior fascicular block  - Echo pending  - Lipid panel; LDL 56, cholesterol 144 - no need for statin in setting of ICH  - Continue telemetry     PULMONARY:  - Maintaining O2 sats on 0-2L nasal canula  - Wean O2 as tolerates  - Incentive spirometry  - CXR showed prominence or right paratracheal stripe which could be positional, mild interstitial prominence  - Consider CT chest if needed     RENAL/FLUID/ELECTROLYTE:  - Normal renal functioning  - BUN 14/ Creatinine 0.67  - Monitor I&Os  - No maintenance IVF, tolerating PO  - Replace electrolytes PRN  - Daily BMP     GI/NUTRITION:  NUTRITION:  ADULT DIET; Regular   - Passed speech bedside swallow eval; Easy to chew, thin liquids  - Chronic diarrhea  - Resume home Cholestyramine 4g BID, Lomotil 2.5-0.025mg QID prn  - GI prophylaxis: N/A     ID:  - Afebrile, 37.1  - No leukocytosis, WBC 10.5  - UA consistent with UTI, follow up culture  - Start Keflex 500mg QID  - CXR with mild interstitial prominence, edema vs pneumonitis  - Daily CBC     HEME:   - H&H 12.9/40.3  - Platelets 827  - Daily CBC     ENDOCRINE:  - Continue to monitor blood glucose, goal <180  - Hemoglobin A1C 6.3  - Glucose well controlled     OTHER:  - PT/OT/ST   - Code Status: FULL     PROPHYLAXIS:  Stress ulcer: N/A     DVT PROPHYLAXIS:  - SCD sleeves - Thigh High   - Start Lovenox today, OK with neurosurgery      DISPOSITION:  [x] To remain ICU: close neurological monitoring  [] OK for out of ICU from Neuro Critical Care standpoint    We will continue to follow along. For any changes in exam or patient status please contact Neuro Critical Care.       CHRISTIANNE Alford - [facility]  Neuro Critical Care  Pager [phone]  7/14/2021     8:22 AM

## 2021-07-14 NOTE — PROGRESS NOTES
[facility]  Speech Language Pathology    Date: 7/14/2021  Patient Name: Sarah Rae  YOB: 1940   AGE: 78 y.o. MRN: [id number]        Patient Not Available for Speech Therapy     Due to:  [] Testing  [] Hemodialysis  [] Cancelled by RN  [] Surgery   [] Intubation/Sedation/Pain Medication  [] Medical instability  [x] Other: Pt. With other disciplines     Next scheduled treatment:  7/15/2021    Completed by: [address]  Clinician    Cosigned By: Faye Mayfield S.CCC/SLP

## 2021-07-14 NOTE — PROGRESS NOTES
Neurosurgery KAN/Resident    Daily Progress Note   CC:No chief complaint on file. 7/14/2021  6:49 AM    Chart reviewed. No acute events overnight. No new complaints. Doing well overnight, stable exam, no ne CT head imaging   -150  Vitals:    07/14/21 0530 07/14/21 0545 07/14/21 0600 07/14/21 0615   BP: (!) 184/90 (!) 132/111 (!) 145/58 126/74   Pulse: 97 97 75 74   Resp: 27  18 18   Temp:       TempSrc:       SpO2: 96% 96% 97%        PE:   Awake alert and oriented x3  Opens eyes spontaneously  Following commands on right side  Left side facial droop  Left hemiparesis  Reports sensation intact to light touch        Lab Results   Component Value Date    WBC 9.6 07/13/2021    HGB 12.9 07/13/2021    HCT 39.7 07/13/2021     07/13/2021    CHOL 144 07/13/2021    TRIG 194 (H) 07/13/2021    HDL 49 07/13/2021    ALT 35 (H) 07/12/2021    AST 27 07/12/2021     07/13/2021    K 3.8 07/13/2021     07/13/2021    CREATININE 0.63 07/13/2021    BUN 14 07/13/2021    CO2 23 07/13/2021    TSH 1.350 02/04/2021    INR 1.0 07/12/2021    LABA1C 6.3 (H) 07/13/2021         A/P  78 y.o. female who presents with headache, Basal ganglia hemorrhage with intraventricular hemorrhage     Will continue to monitor neurological examinations  No acute interventions at this time   She is at risk for developing hydrocephalus   Ok to start DVT prophylaxis     Please contact neurosurgery with any changes in patients neurologic status.        Darling Clancy, CNP  7/14/21  6:49 AM

## 2021-07-15 LAB
ABSOLUTE EOS #: 0 K/UL (ref 0–0.4)
ABSOLUTE IMMATURE GRANULOCYTE: 0 K/UL (ref 0–0.3)
ABSOLUTE LYMPH #: 0.74 K/UL (ref 1–4.8)
ABSOLUTE MONO #: 0.07 K/UL (ref 0.1–0.8)
ANION GAP SERPL CALCULATED.3IONS-SCNC: 12 MMOL/L (ref 9–17)
BASOPHILS # BLD: 0 % (ref 0–2)
BASOPHILS ABSOLUTE: 0 K/UL (ref 0–0.2)
BUN BLDV-MCNC: 14 MG/DL (ref 8–23)
BUN/CREAT BLD: ABNORMAL (ref 9–20)
CALCIUM SERPL-MCNC: 8.9 MG/DL (ref 8.6–10.4)
CHLORIDE BLD-SCNC: 106 MMOL/L (ref 98–107)
CO2: 23 MMOL/L (ref 20–31)
CREAT SERPL-MCNC: 0.55 MG/DL (ref 0.5–0.9)
DIFFERENTIAL TYPE: ABNORMAL
EOSINOPHILS RELATIVE PERCENT: 0 % (ref 1–4)
GFR AFRICAN AMERICAN: >60 ML/MIN
GFR NON-AFRICAN AMERICAN: >60 ML/MIN
GFR SERPL CREATININE-BSD FRML MDRD: ABNORMAL ML/MIN/{1.73_M2}
GFR SERPL CREATININE-BSD FRML MDRD: ABNORMAL ML/MIN/{1.73_M2}
GLUCOSE BLD-MCNC: 170 MG/DL (ref 70–99)
HCT VFR BLD CALC: 40.8 % (ref 36.3–47.1)
HEMOGLOBIN: 13.2 G/DL (ref 11.9–15.1)
IMMATURE GRANULOCYTES: 0 %
LV EF: 58 %
LVEF MODALITY: NORMAL
LYMPHOCYTES # BLD: 10 % (ref 24–44)
MCH RBC QN AUTO: 30.7 PG (ref 25.2–33.5)
MCHC RBC AUTO-ENTMCNC: 32.4 G/DL (ref 28.4–34.8)
MCV RBC AUTO: 94.9 FL (ref 82.6–102.9)
MONOCYTES # BLD: 1 % (ref 1–7)
MORPHOLOGY: NORMAL
NRBC AUTOMATED: 0 PER 100 WBC
PDW BLD-RTO: 13.2 % (ref 11.8–14.4)
PLATELET # BLD: 344 K/UL (ref 138–453)
PLATELET ESTIMATE: ABNORMAL
PMV BLD AUTO: 10.1 FL (ref 8.1–13.5)
POTASSIUM SERPL-SCNC: 4.3 MMOL/L (ref 3.7–5.3)
RBC # BLD: 4.3 M/UL (ref 3.95–5.11)
RBC # BLD: ABNORMAL 10*6/UL
SEG NEUTROPHILS: 89 % (ref 36–66)
SEGMENTED NEUTROPHILS ABSOLUTE COUNT: 6.59 K/UL (ref 1.8–7.7)
SODIUM BLD-SCNC: 141 MMOL/L (ref 135–144)
WBC # BLD: 7.4 K/UL (ref 3.5–11.3)
WBC # BLD: ABNORMAL 10*3/UL

## 2021-07-15 PROCEDURE — 6360000002 HC RX W HCPCS: Performed by: NURSE PRACTITIONER

## 2021-07-15 PROCEDURE — 97129 THER IVNTJ 1ST 15 MIN: CPT

## 2021-07-15 PROCEDURE — 2500000003 HC RX 250 WO HCPCS: Performed by: STUDENT IN AN ORGANIZED HEALTH CARE EDUCATION/TRAINING PROGRAM

## 2021-07-15 PROCEDURE — 93306 TTE W/DOPPLER COMPLETE: CPT

## 2021-07-15 PROCEDURE — 6360000002 HC RX W HCPCS: Performed by: STUDENT IN AN ORGANIZED HEALTH CARE EDUCATION/TRAINING PROGRAM

## 2021-07-15 PROCEDURE — 36415 COLL VENOUS BLD VENIPUNCTURE: CPT

## 2021-07-15 PROCEDURE — 6370000000 HC RX 637 (ALT 250 FOR IP): Performed by: PSYCHIATRY & NEUROLOGY

## 2021-07-15 PROCEDURE — 2060000000 HC ICU INTERMEDIATE R&B

## 2021-07-15 PROCEDURE — APPNB30 APP NON BILLABLE TIME 0-30 MINS: Performed by: REGISTERED NURSE

## 2021-07-15 PROCEDURE — 6370000000 HC RX 637 (ALT 250 FOR IP): Performed by: NURSE PRACTITIONER

## 2021-07-15 PROCEDURE — 97110 THERAPEUTIC EXERCISES: CPT

## 2021-07-15 PROCEDURE — 97530 THERAPEUTIC ACTIVITIES: CPT

## 2021-07-15 PROCEDURE — 80048 BASIC METABOLIC PNL TOTAL CA: CPT

## 2021-07-15 PROCEDURE — 99233 SBSQ HOSP IP/OBS HIGH 50: CPT | Performed by: PSYCHIATRY & NEUROLOGY

## 2021-07-15 PROCEDURE — 85025 COMPLETE CBC W/AUTO DIFF WBC: CPT

## 2021-07-15 PROCEDURE — 2580000003 HC RX 258: Performed by: PSYCHIATRY & NEUROLOGY

## 2021-07-15 RX ORDER — HYDRALAZINE HYDROCHLORIDE 20 MG/ML
10 INJECTION INTRAMUSCULAR; INTRAVENOUS ONCE
Status: COMPLETED | OUTPATIENT
Start: 2021-07-15 | End: 2021-07-15

## 2021-07-15 RX ORDER — ZIPRASIDONE MESYLATE 20 MG/ML
10 INJECTION, POWDER, LYOPHILIZED, FOR SOLUTION INTRAMUSCULAR ONCE
Status: COMPLETED | OUTPATIENT
Start: 2021-07-15 | End: 2021-07-16

## 2021-07-15 RX ORDER — FENTANYL CITRATE 50 UG/ML
25 INJECTION, SOLUTION INTRAMUSCULAR; INTRAVENOUS ONCE
Status: COMPLETED | OUTPATIENT
Start: 2021-07-15 | End: 2021-07-15

## 2021-07-15 RX ORDER — CEPHALEXIN 500 MG/1
500 CAPSULE ORAL EVERY 12 HOURS SCHEDULED
Status: COMPLETED | OUTPATIENT
Start: 2021-07-15 | End: 2021-07-19

## 2021-07-15 RX ADMIN — SODIUM CHLORIDE, PRESERVATIVE FREE 10 ML: 5 INJECTION INTRAVENOUS at 10:53

## 2021-07-15 RX ADMIN — SODIUM CHLORIDE, PRESERVATIVE FREE 10 ML: 5 INJECTION INTRAVENOUS at 22:35

## 2021-07-15 RX ADMIN — CEPHALEXIN 500 MG: 500 CAPSULE ORAL at 05:34

## 2021-07-15 RX ADMIN — HYDRALAZINE HYDROCHLORIDE 10 MG: 20 INJECTION INTRAMUSCULAR; INTRAVENOUS at 22:35

## 2021-07-15 RX ADMIN — HYDROCHLOROTHIAZIDE 25 MG: 25 TABLET ORAL at 14:47

## 2021-07-15 RX ADMIN — CHOLESTYRAMINE 4 G: 4 POWDER, FOR SUSPENSION ORAL at 20:41

## 2021-07-15 RX ADMIN — ENOXAPARIN SODIUM 40 MG: 40 INJECTION SUBCUTANEOUS at 08:56

## 2021-07-15 RX ADMIN — FAMOTIDINE 20 MG: 20 TABLET, FILM COATED ORAL at 20:41

## 2021-07-15 RX ADMIN — LOSARTAN POTASSIUM 100 MG: 50 TABLET, FILM COATED ORAL at 08:57

## 2021-07-15 RX ADMIN — DEXAMETHASONE SODIUM PHOSPHATE 4 MG: 4 INJECTION, SOLUTION INTRAMUSCULAR; INTRAVENOUS at 02:40

## 2021-07-15 RX ADMIN — SODIUM CHLORIDE, PRESERVATIVE FREE 10 ML: 5 INJECTION INTRAVENOUS at 20:41

## 2021-07-15 RX ADMIN — ACETAMINOPHEN 650 MG: 325 TABLET ORAL at 17:27

## 2021-07-15 RX ADMIN — FAMOTIDINE 20 MG: 20 TABLET, FILM COATED ORAL at 08:57

## 2021-07-15 RX ADMIN — Medication 10 MG: at 20:41

## 2021-07-15 RX ADMIN — FENTANYL CITRATE 25 MCG: 50 INJECTION, SOLUTION INTRAMUSCULAR; INTRAVENOUS at 22:35

## 2021-07-15 RX ADMIN — CEPHALEXIN 500 MG: 500 CAPSULE ORAL at 20:41

## 2021-07-15 RX ADMIN — DEXAMETHASONE SODIUM PHOSPHATE 4 MG: 4 INJECTION, SOLUTION INTRAMUSCULAR; INTRAVENOUS at 10:52

## 2021-07-15 RX ADMIN — AMLODIPINE BESYLATE 10 MG: 10 TABLET ORAL at 08:55

## 2021-07-15 RX ADMIN — DEXAMETHASONE SODIUM PHOSPHATE 4 MG: 4 INJECTION, SOLUTION INTRAMUSCULAR; INTRAVENOUS at 18:20

## 2021-07-15 RX ADMIN — CHOLESTYRAMINE 4 G: 4 POWDER, FOR SUSPENSION ORAL at 10:52

## 2021-07-15 ASSESSMENT — PAIN SCALES - WONG BAKER
WONGBAKER_NUMERICALRESPONSE: 6
WONGBAKER_NUMERICALRESPONSE: 4
WONGBAKER_NUMERICALRESPONSE: 8

## 2021-07-15 ASSESSMENT — PAIN SCALES - GENERAL
PAINLEVEL_OUTOF10: 3
PAINLEVEL_OUTOF10: 8

## 2021-07-15 ASSESSMENT — PAIN DESCRIPTION - PROGRESSION: CLINICAL_PROGRESSION: NOT CHANGED

## 2021-07-15 NOTE — PROGRESS NOTES
Daily Progress Note  Neuro Critical Care    Patient Name: Franki Bustamante  Patient : 1940  Room/Bed: 4744/2155-18  Code Status: Full  Allergies: Allergies   Allergen Reactions    Clonidine Derivatives     Morphine     Sulfa Antibiotics        CHIEF COMPLAINT:      ICH     INTERVAL HISTORY    Initial Presentation (Admitted 21): The patient is a 78 y. o. female with a history of HTN, HLD and CVA () who presented as a transfer from [facility] with a right basal ganglia IPH. Patient initially presented to [facility] with acute onset left sided weakness. LKW around 1700, about 30 minutes prior to arrival at [facility]. Initial . Patient had stopped taking her antihypertensive medications 3 days prior. NIH 12.  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 Started on Cardene infusion for BP control. Transferred to [facility] for further evaluation and management. On arrival to [facility] ED, NIH 11. On Cardene infusion for BP control. CT Head stable. CTA Head/Neck unremarkable. Neurosurgery consulted. Admitted to Neuro ICU for close monitoring. Hospital Course:   : CT head stable. Home oral antihypertensives restarted. Febrile, UA sent. : Cardene infusion weaned off around 0300. Norvasc increased to 10 mg QD, remains off Cardene infusion. Discontinue Keppra, monitor off AEDs. Patient complaining of headache this morning, received Magnesium, caffeine and fluid bolus with some improvement. Headache continued this afternoon, will start Decadron 4mg q8h. Last 24h:  No acute events overnight. Clinical exam remains stable. Patient was mildly drowsy on exam but awakened briskly to voice. Continues to have left facial droop, dense left hemiparesis. Goal SBP<160. Continue to monitor blood pressure trend.       CURRENT MEDICATIONS:  SCHEDULED MEDICATIONS:   cholestyramine light  4 g Oral BID    cephALEXin  500 mg Oral 4 times per day  dexamethasone  4 mg Intravenous Q8H    famotidine  20 mg Oral BID    amLODIPine  10 mg Oral Daily    enoxaparin  40 mg Subcutaneous Daily    losartan  100 mg Oral Daily    And    hydroCHLOROthiazide  25 mg Oral Daily    sodium chloride flush  5-40 mL Intravenous 2 times per day     CONTINUOUS INFUSIONS:   niCARdipine Stopped (21)    sodium chloride       PRN MEDICATIONS:   diphenoxylate-atropine, labetalol, sodium chloride flush, sodium chloride, acetaminophen, ondansetron **OR** ondansetron    VITALS:  Temperature Range: Temp: 97.9 °F (36.6 °C) Temp  Av.3 °F (36.8 °C)  Min: 97.7 °F (36.5 °C)  Max: 98.7 °F (37.1 °C)  BP Range: Systolic (06RIM), MBE:405 , Min:108 , MQB:909     Diastolic (59HCN), XCL:41, Min:39, Max:100    Pulse Range: Pulse  Av.3  Min: 65  Max: 94  Respiration Range: Resp  Av  Min: 14  Max: 22  Current Pulse Ox: SpO2: 92 %  24HR Pulse Ox Range: SpO2  Av.7 %  Min: 92 %  Max: 99 %  Patient Vitals for the past 12 hrs:   BP Temp Temp src Pulse Resp SpO2 Height Weight   07/15/21 0330 (!) 149/80 97.9 °F (36.6 °C) Oral 72 21 92 % 4' 9\" (1.448 m) 141 lb 15.6 oz (64.4 kg)   07/15/21 0200 110/65   65 14 97 %     07/15/21 0100 (!) 142/39   69 19 96 %     21 2330 (!) 164/78   79 20 99 %     21 2300 (!) 166/76   82 17 95 %     21 (!) 108/42   71 16 99 %     21 (!) 113/53   73 21 99 %     21 2100 128/80   73 19 98 %     21 (!) 164/76 98.7 °F (37.1 °C) Oral 76 20 96 %     21 1907 (!) 190/86   80  96 %       Estimated body mass index is 30.72 kg/m² as calculated from the following:    Height as of this encounter: 4' 9\" (1.448 m).     Weight as of this encounter: 141 lb 15.6 oz (64.4 kg).  []<16 Severe malnutrition  []1616.99 Moderate malnutrition  []1718.49 Mild malnutrition  []18.524.9 Normal  []2529.9 Overweight (not obese)  [x]3034.9 Obese class 1 (Low Risk)  []3539.9 Obese class 2 (Moderate Risk)  []?40 Obese class 3 (High Risk)    RECENT LABS:   Lab Results   Component Value Date    WBC 10.5 07/14/2021    HGB 12.9 07/14/2021    HCT 40.3 07/14/2021     07/14/2021    CHOL 144 07/13/2021    TRIG 194 (H) 07/13/2021    HDL 49 07/13/2021    ALT 35 (H) 07/12/2021    AST 27 07/12/2021     07/14/2021    K 3.5 (L) 07/14/2021     07/14/2021    CREATININE 0.67 07/14/2021    BUN 14 07/14/2021    CO2 27 07/14/2021    TSH 1.350 02/04/2021    INR 1.0 07/12/2021    LABA1C 6.3 (H) 07/13/2021     24 HOUR INTAKE/OUTPUT:    Intake/Output Summary (Last 24 hours) at 7/15/2021 0633  Last data filed at 7/15/2021 0200  Gross per 24 hour   Intake 2630 ml   Output 1335 ml   Net 1295 ml       IMAGING:   XR CHEST PORTABLE   Final Result   1. Prominence of the right paratracheal stripe which could be positional.   If clinically indicated, this could be further evaluated with a   contrast-enhanced CT chest.      2.  Mild interstitial prominence in the lungs, possibly chronic interstitial   change versus edema versus pneumonitis. CT head without contrast   Final Result   Stable examination. CTA HEAD W CONTRAST   Final Result   1. Stable appearance to right basal ganglia hemorrhage with intraventricular   hemorrhage most likely representing rupture of the basal ganglia hemorrhage   into the ventricular system with asymmetric amount of blood in the right   lateral ventricle compared to the left. Blood fills the 3rd ventricle and   the sylvian aqueduct is also blood in the 4th ventricle. 2. Unremarkable CTA of the head. Findings were discussed with Dr. Naomi Echavarria at 7:58 pm on 7/12/2021. CT HEAD WO CONTRAST   Final Result   1.  Stable appearance to right basal ganglia hemorrhage with intraventricular   hemorrhage most likely representing rupture of the basal ganglia hemorrhage   into the ventricular system with asymmetric amount of blood in the right   lateral ventricle compared to the left. Blood fills the 3rd ventricle and   the sylvian aqueduct is also blood in the 4th ventricle. 2. Unremarkable CTA of the head. Findings were discussed with Dr. Rupal Hendrix at 7:58 pm on 7/12/2021. Labs and Images reviewed with:  [x] Dr. Pattie Arriaga. Evelina    [] Dr. Minh Grullon  [] Dr. Carl Roy  [] There are no new interval images to review. PHYSICAL EXAM       CONSTITUTIONAL:  Drowsy but awakens to voice. Oriented x3. In no acute distress. GCS 15. Nontoxic. Mild dysarthria. No aphasia. HEAD:  normocephalic, atraumatic    EYES:  PERRLA, EOMI.   ENT:  moist mucous membranes   NECK:  supple, symmetric   LUNGS:  Equal air entry bilaterally, clear   CARDIOVASCULAR:  normal s1 / s2, RRR, distal pulses intact   ABDOMEN:  Soft, no rigidity   NEUROLOGIC:  Mental Status:  A & O x3,awake             Cranial Nerves:    II: Visual fields:  normal  III: Pupils:  equal, round, reactive to light  III,IV,VI: Extra Ocular Movements: intact  V: Facial sensation:  intact  VII: Facial strength: abnormal - left facial droop     Motor Exam:    Drift:  present - left upper and lower extremities  Tone:  Decreased left upper extremity     5/5 strength in the right upper and right lower extremities  0/5 strength in the left upper extremity, no movement to pain. Withdraws to pain in the left lower extremity        Sensory:    Touch:    Right Upper Extremity:  normal  Left Upper Extremity:  abnormal - decreased  Right Lower Extremity:  normal  Left Lower Extremity:  abnormal - decreased     DRAINS:  [x] There are no drains for Neuro Critical Care to monitor at this time. ASSESSMENT AND PLAN:       The patient is a 77 yo female with a history of  HTN, HLD and CVA (2004) who presented as a transfer from [facility] with a right basal ganglia IPH. initially presented to [facility] ED with acute onset left sided weakness. Found to be hypertensive with .   CT Head revealed right basal ganglia IPH with intraventricular extension.       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 Derian Rocha  - [address] discontinued  - Goal SBP<160  - Neuro checks per protocol     CARDIOVASCULAR:  - Goal SBP<160  - PRN Labetalol  - Essential HTN  - Continue Losartan-HCTZ 100-25mg daily, Norvasc 10mg QD  - Troponin 11, EKG sinus rhythm, left anterior fascicular block  - Echo pending  - Lipid panel; LDL 56, cholesterol 144 - no need for statin in setting of ICH  - Continue telemetry     PULMONARY:  - Maintaining O2 sats on 0-2L nasal canula  - Wean O2 as tolerates  - Incentive spirometry     RENAL/FLUID/ELECTROLYTE:  - Normal renal functioning  - BUN 14/ Creatinine 0.55  - Monitor I&Os; 2630/1335  - No maintenance IVF, tolerating PO  - Replace electrolytes PRN  - Daily BMP     GI/NUTRITION:  NUTRITION:  ADULT DIET; Regular   - Passed speech bedside swallow eval; Easy to chew, thin liquids  - Chronic diarrhea  - Resume home Cholestyramine 4g BID, Lomotil 2.5-0.025mg QID prn  - GI prophylaxis: N/A     ID:  - Afebrile, 37.1C  - No leukocytosis, WBC 7.4  - UA consistent with UTI, follow up culture  - Start Keflex 500mg BID x 5 days  - Daily CBC     HEME:   - H&H 13.2/40.8  - Platelets 497  - Daily CBC     ENDOCRINE:  - Continue to monitor blood glucose, goal <180  - Hemoglobin A1C 6.3  - Glucose well controlled     OTHER:  - PT/OT/ST   - Code Status: FULL     PROPHYLAXIS:  Stress ulcer: N/A     DVT PROPHYLAXIS:  - SCD sleeves - Thigh High   - Lovenox 40mg QD     DISPOSITION:  [x] OK for out of ICU from Neuro Critical Care standpoint    We will continue to follow along. For any changes in exam or patient status please contact Neuro Critical Care.       CHRISTIANNE Brock - [facility]  Neuro Critical Care  Pager [phone]  7/15/2021     6:33 AM

## 2021-07-15 NOTE — PLAN OF CARE
Problem: Falls - Risk of:  Goal: Will remain free from falls  Description: Will remain free from falls  7/15/2021 0444 by Evonne Alexandra RN  Outcome: Ongoing  7/14/2021 1607 by Indiana Almanza RN  Outcome: Met This Shift  Goal: Absence of physical injury  Description: Absence of physical injury  Outcome: Ongoing

## 2021-07-15 NOTE — PLAN OF CARE
Problem: Falls - Risk of:  Goal: Will remain free from falls  Description: Will remain free from falls  7/15/2021 1654 by Izaiah Jackson RN  Outcome: Ongoing  7/15/2021 0444 by Axel Chavarria RN  Outcome: Ongoing  Goal: Absence of physical injury  Description: Absence of physical injury  7/15/2021 1654 by Izaiah Jackson RN  Outcome: Ongoing  7/15/2021 0444 by Axel Chavarria RN  Outcome: Ongoing     Problem: HEMODYNAMIC STATUS  Goal: Patient has stable vital signs and fluid balance  7/15/2021 1654 by Izaiah Jackson RN  Outcome: Ongoing  7/15/2021 0444 by Axel Chavarria RN  Outcome: Ongoing     Problem: ACTIVITY INTOLERANCE/IMPAIRED MOBILITY  Goal: Mobility/activity is maintained at optimum level for patient  7/15/2021 1654 by Izaiah Jackson RN  Outcome: Ongoing  7/15/2021 0444 by Axel Chavarria RN  Outcome: Ongoing     Problem: COMMUNICATION IMPAIRMENT  Goal: Ability to express needs and understand communication  7/15/2021 1654 by Izaiah Jackson RN  Outcome: Ongoing  7/15/2021 0444 by Axel Chavarria RN  Outcome: Ongoing     Problem: Coping:  Goal: Ability to cope will improve  Description: Ability to cope will improve  7/15/2021 1654 by Izaiah Jackson RN  Outcome: Ongoing  7/15/2021 0444 by Axel Chavarria RN  Outcome: Ongoing  Goal: Ability to identify appropriate support needs will improve  Description: Ability to identify appropriate support needs will improve  7/15/2021 1654 by Izaiah Jackson RN  Outcome: Ongoing  7/15/2021 0444 by Axel Chavarria RN  Outcome: Ongoing     Problem: Health Behavior:  Goal: Ability to manage health-related needs will improve  Description: Ability to manage health-related needs will improve  7/15/2021 1654 by Izaiah Jackson RN  Outcome: Ongoing  7/15/2021 0444 by Axel Chavarria RN  Outcome: Ongoing     Problem: Physical Regulation:  Goal: Signs of adequate cerebral perfusion will increase  Description: Signs of adequate cerebral perfusion will increase  7/15/2021 1654 by Debbie Turner RN  Outcome: Ongoing  7/15/2021 0444 by Juni Jaramillo RN  Outcome: Ongoing  Goal: Ability to maintain a stable neurologic state will improve  Description: Ability to maintain a stable neurologic state will improve  7/15/2021 1654 by Debbie Turner RN  Outcome: Ongoing  7/15/2021 0444 by Juni Jaramillo RN  Outcome: Ongoing     Problem: Safety:  Goal: Ability to remain free from injury will improve  Description: Ability to remain free from injury will improve  7/15/2021 1654 by Debbie Turner RN  Outcome: Ongoing  7/15/2021 0444 by Juni Jaramillo RN  Outcome: Ongoing     Problem: Self-Concept:  Goal: Level of anxiety will decrease  Description: Level of anxiety will decrease  7/15/2021 1654 by Debbie Turner RN  Outcome: Ongoing  7/15/2021 0444 by Juni Jaramillo RN  Outcome: Ongoing  Goal: Ability to verbalize feelings about condition will improve  Description: Ability to verbalize feelings about condition will improve  7/15/2021 1654 by Debbie Turner RN  Outcome: Ongoing  7/15/2021 0444 by Juni Jaramillo RN  Outcome: Ongoing     Problem: Pain:  Goal: Pain level will decrease  Description: Pain level will decrease  7/15/2021 1654 by Debbie Turner RN  Outcome: Ongoing  7/15/2021 0444 by Juni Jaramillo RN  Outcome: Ongoing  Goal: Control of acute pain  Description: Control of acute pain  7/15/2021 1654 by Debbie Turner RN  Outcome: Ongoing  7/15/2021 0444 by Juni Jaramillo RN  Outcome: Ongoing  Goal: Control of chronic pain  Description: Control of chronic pain  7/15/2021 1654 by Debbie Turner RN  Outcome: Ongoing  7/15/2021 0444 by Juni Jaramillo RN  Outcome: Ongoing     Problem: Skin Integrity:  Goal: Will show no infection signs and symptoms  Description: Will show no infection signs and symptoms  7/15/2021 1654 by Debbie Turner RN  Outcome: Ongoing  7/15/2021 0444 by Juni Jaramillo RN  Outcome: Ongoing  Goal: Absence of new skin breakdown  Description: Absence of new skin breakdown  7/15/2021 1654 by Melissa Gil RN  Outcome: Ongoing  7/15/2021 0444 by Elicia Lopez RN  Outcome: Ongoing

## 2021-07-15 NOTE — PROGRESS NOTES
Physical Therapy  Facility/Department: [facility] NEURO  Daily Treatment Note  NAME: Ross Nguyen  : 1940  MRN: [id number]    Date of Service: 7/15/2021    Discharge Recommendations: Further therapy recommended at discharge. PT Equipment Recommendations  Equipment Needed: No    Assessment   Body structures, Functions, Activity limitations: Decreased functional mobility ; Decreased strength;Decreased safe awareness;Decreased balance;Decreased sensation;Decreased endurance;Decreased ROM  Assessment:The pt was overall MOD x2 for bed mobility, tolerated ~8mins at EOB  CG-SBA for balance. Pt was limited by  increase BP and dizziness, symptoms improved when pt returned to supine. Pt LLE/LUE continues to be flaccid and requires MaxA to perform any ROM. pt will benefit from continued therapy to adress deficits. Prognosis: Fair  Decision Making: High Complexity  REQUIRES PT FOLLOW UP: Yes  Activity Tolerance  Activity Tolerance: Patient Tolerated treatment well;Patient limited by endurance; Patient limited by fatigue  Activity Tolerance: Pt activity tolerance was limited from increase BP and feeling dizzy. Patient Diagnosis(es): The encounter diagnosis was Intracranial hemorrhage ([facility] [address] 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 has a past surgical history that includes Appendectomy; Cholecystectomy; hernia repair; Tonsillectomy; Tubal ligation; and Hysterectomy.     Restrictions  Restrictions/Precautions  Restrictions/Precautions: General Precautions, Up as Tolerated, Seizure  Required Braces or Orthoses?: No  Position Activity Restriction  Other position/activity restrictions: SBP <160  Subjective   General  Chart Reviewed: Yes  Response To Previous Treatment: Patient with no complaints from previous session. Family / Caregiver Present: No  Subjective  Subjective: RN and pt agreeable to PT. Pt c/o headaches that apears every morning following 20 sneezes. Pain Screening  Patient Currently in Pain: Yes  Pain Assessment  Dominguez-Arellano Pain Rating: Hurts even more  Clinical Progression: Not changed  Non-Pharmaceutical Pain Intervention(s): Therapeutic presence;Rest;Repositioned  Response to Pain Intervention: Patient Satisfied  Vital Signs  Patient Currently in Pain: Yes       Orientation  Orientation  Overall Orientation Status: Within Functional Limits  Cognition      Objective   Bed mobility  Rolling to Left: Unable to assess  Rolling to Right: Moderate assistance;2 Person assistance  Supine to Sit: 2 Person assistance; Moderate assistance  Sit to Supine: 2 Person assistance; Moderate assistance  Scooting: Moderate assistance  Comment: Pt required modA x2 with increase time to perform supine to sit at EOB. Pt sat EOB for 8 mins  Transfers  Sit to Stand: Unable to assess  Stand to sit: Unable to assess  Bed to Chair: Unable to assess  Comment: Pt did not perform any transfers this date d/t increased BP. Ambulation  Ambulation?: No  Stairs/Curb  Stairs?: No     Balance  Posture: Poor  Sitting - Static: Poor  Sitting - Dynamic: Poor  Standing - Static: Poor  Standing - Dynamic: Poor  Exercises  Comments: PROM LLE/LUE x 10 all planes; AROM with RUE/RLE with cueing for correct body mechanics. Exercises: Ankle pumps, heel slide, short arc quads, straight leg raises, elbow flexion, shoulder flexion, shoulder abd/add, and elbow ext. Reps: 10x.   Goals  Short term goals  Time Frame for Short term goals: 12 visits  Short term goal 1: Perform bed mob Ariella+1  Short term goal 2: Perform transfers modA+1  Short term goal 3: Ambulate 25' modA+2 w/ appropriate device  Short term goal 4: Dangle EOB ~15 min SBA  Short term goal 5: Facilitate active movement LUE/LLE  Patient Goals   Patient goals : Go home    Plan    Plan  Times per week: 5-6 visits weekly  Times per day: Daily  Current Treatment Recommendations: Strengthening, ROM, Balance Training, Functional Mobility Training, Gait Training, Endurance Training, Transfer Training, Safety Education & Training  Safety Devices  Type of devices: Nurse notified, Patient at risk for falls, Gait belt, Chair alarm in place, Call light within reach, Left in chair  Restraints  Initially in place: No     Therapy Time   Individual Concurrent Group Co-treatment   Time In 0827         Time Out 0856         Minutes 29         Timed Code Treatment Minutes: 29 Minutes     Treatment performed by Student PTA under the supervision of co-signing PTA who agrees with all treatment and documentation. Annabel Clancy PTA.   Flaquita Everett

## 2021-07-15 NOTE — PROGRESS NOTES
Speech Language Pathology  Speech Language Pathology  [facility]    Cognitive Treatment Note    Date: 7/15/2021  Patients Name: Christopher Poole  MRN: [id number]  Diagnosis:   Patient Active Problem List   Diagnosis Code    Personal history of other malignant neoplasm of skin Z85.828    Neoplasm of uncertain behavior of skin D48.5    Essential hypertension I10    Osteoarthritis M19.90    Chronic diarrhea K52.9    Thalamic hemorrhage with stroke ([facility] [address] I61.9    Intracranial hemorrhage ([facility] [address] I62.9    Hypertensive emergency I16.1    IVH (intraventricular hemorrhage) ([facility] [address] I61.5    Acute left hemiparesis (HCC) G81.94       Pain: 0/10    Cognitive Treatment    Treatment time: 5926-2074      Subjective: [x] Alert [x] Cooperative     [] Confused     [] Agitated    [] Lethargic      Objective/Assessment:    Recall: Delayed recall of 3 associated units: 3/3. Problem Solving/Reasoning: Naming 5 members of a category: 18/20 increased to 20/20 with min verbal cues. Similarities/differences: 9/10 increased to 10/10 with min verbal cues. State the category - concrete: 5/7 increased to 7/7 with min verbal cues. Other: Automatic associations - common food: 6/6. Pt. Seen for O/M exercise program for facial weakness. Pt. Completed exercises X 10 X 1 set with min verbal cues. Pt. With left facial weakness at this time. Exercise program left at bedside. Pt. Encouraged to complete exercises daily. Pt. Verbalized understanding. Pt. Tearful throughout session; maximum encouragement required for pt. To complete ST tasks. Plan:  [x] Continue ST services    [] Discharge from ST:      Discharge recommendations: [] Inpatient Rehab   [] [place]   [] Outpatient Therapy  [] Follow up at trauma clinic   [x] Other:  Further therapy recommended at discharge. Completed by: [address]  Clinician    Cosigned By: Aj MEDINACCC/SLP

## 2021-07-15 NOTE — PROGRESS NOTES
Neurosurgery KAN/Resident    Daily Progress Note   No chief complaint on file. 7/15/2021  4:11 PM    Chart reviewed. No acute events overnight. No new complaints. Mild headache. Denies nausea. Vitals:    07/15/21 0831 07/15/21 0855 07/15/21 1215 07/15/21 1445   BP:  (!) 161/86 (!) 145/71 (!) 146/76   Pulse: 76  78    Resp:   16    Temp:   97.7 °F (36.5 °C)    TempSrc:   Oral    SpO2:       Weight:       Height:             PE: AOx3   Motor   Left hemiplegia  Right UE and LE 5/5 throughout     Sensation decreased to light touch throughout LUE and LLE      Lab Results   Component Value Date    WBC 7.4 07/15/2021    HGB 13.2 07/15/2021    HCT 40.8 07/15/2021     07/15/2021    CHOL 144 07/13/2021    TRIG 194 (H) 07/13/2021    HDL 49 07/13/2021    ALT 35 (H) 07/12/2021    AST 27 07/12/2021     07/15/2021    K 4.3 07/15/2021     07/15/2021    CREATININE 0.55 07/15/2021    BUN 14 07/15/2021    CO2 23 07/15/2021    TSH 1.350 02/04/2021    INR 1.0 07/12/2021    LABA1C 6.3 (H) 07/13/2021           A/P  78 y.o. female who presents with basal ganglia hemorrhage with intraventricular hemorrhage      - No acute interventions at this time   - will sign off, follow up in 6 weeks with Dr Doug Stoddard with repeat CT head  - please call with any questions or concerns      Please contact neurosurgery with any changes in patients neurologic status.        Salvador Joy CNP  7/15/21  4:11 PM

## 2021-07-15 NOTE — PROGRESS NOTES
Patient's daughter Vini Mode updated on the POC. Requesting to speak with  regarding discharge plan. Message passed along to [facility] from case management.       Electronically signed by Sana Henderson RN on 7/15/2021 at 2:03 PM

## 2021-07-16 ENCOUNTER — APPOINTMENT (OUTPATIENT)
Dept: GENERAL RADIOLOGY | Age: 81
DRG: 065 | End: 2021-07-16
Payer: MEDICARE

## 2021-07-16 LAB
ABSOLUTE EOS #: 0.03 K/UL (ref 0–0.44)
ABSOLUTE IMMATURE GRANULOCYTE: 0.06 K/UL (ref 0–0.3)
ABSOLUTE LYMPH #: 0.89 K/UL (ref 1.1–3.7)
ABSOLUTE MONO #: 0.25 K/UL (ref 0.1–1.2)
ANION GAP SERPL CALCULATED.3IONS-SCNC: 17 MMOL/L (ref 9–17)
BASOPHILS # BLD: 0 % (ref 0–2)
BASOPHILS ABSOLUTE: <0.03 K/UL (ref 0–0.2)
BUN BLDV-MCNC: 19 MG/DL (ref 8–23)
BUN/CREAT BLD: ABNORMAL (ref 9–20)
CALCIUM SERPL-MCNC: 9.2 MG/DL (ref 8.6–10.4)
CHLORIDE BLD-SCNC: 103 MMOL/L (ref 98–107)
CO2: 20 MMOL/L (ref 20–31)
CREAT SERPL-MCNC: 0.64 MG/DL (ref 0.5–0.9)
DIFFERENTIAL TYPE: ABNORMAL
EOSINOPHILS RELATIVE PERCENT: 0 % (ref 1–4)
GFR AFRICAN AMERICAN: >60 ML/MIN
GFR NON-AFRICAN AMERICAN: >60 ML/MIN
GFR SERPL CREATININE-BSD FRML MDRD: ABNORMAL ML/MIN/{1.73_M2}
GFR SERPL CREATININE-BSD FRML MDRD: ABNORMAL ML/MIN/{1.73_M2}
GLUCOSE BLD-MCNC: 169 MG/DL (ref 70–99)
HCT VFR BLD CALC: 45.4 % (ref 36.3–47.1)
HEMOGLOBIN: 14.5 G/DL (ref 11.9–15.1)
IMMATURE GRANULOCYTES: 0 %
LYMPHOCYTES # BLD: 7 % (ref 24–43)
MCH RBC QN AUTO: 30.2 PG (ref 25.2–33.5)
MCHC RBC AUTO-ENTMCNC: 31.9 G/DL (ref 28.4–34.8)
MCV RBC AUTO: 94.6 FL (ref 82.6–102.9)
MONOCYTES # BLD: 2 % (ref 3–12)
NRBC AUTOMATED: 0 PER 100 WBC
PDW BLD-RTO: 13.4 % (ref 11.8–14.4)
PLATELET # BLD: ABNORMAL K/UL (ref 138–453)
PLATELET ESTIMATE: ABNORMAL
PLATELET, FLUORESCENCE: NORMAL K/UL (ref 138–453)
PLATELET, IMMATURE FRACTION: NORMAL % (ref 1.1–10.3)
PMV BLD AUTO: ABNORMAL FL (ref 8.1–13.5)
POTASSIUM SERPL-SCNC: 4.5 MMOL/L (ref 3.7–5.3)
PROCALCITONIN: 0.04 NG/ML
RBC # BLD: 4.8 M/UL (ref 3.95–5.11)
RBC # BLD: ABNORMAL 10*6/UL
SEG NEUTROPHILS: 91 % (ref 36–65)
SEGMENTED NEUTROPHILS ABSOLUTE COUNT: 12.38 K/UL (ref 1.5–8.1)
SODIUM BLD-SCNC: 140 MMOL/L (ref 135–144)
WBC # BLD: 13.6 K/UL (ref 3.5–11.3)
WBC # BLD: ABNORMAL 10*3/UL

## 2021-07-16 PROCEDURE — 97530 THERAPEUTIC ACTIVITIES: CPT

## 2021-07-16 PROCEDURE — 97535 SELF CARE MNGMENT TRAINING: CPT

## 2021-07-16 PROCEDURE — 97110 THERAPEUTIC EXERCISES: CPT

## 2021-07-16 PROCEDURE — 6360000002 HC RX W HCPCS: Performed by: NURSE PRACTITIONER

## 2021-07-16 PROCEDURE — 2060000000 HC ICU INTERMEDIATE R&B

## 2021-07-16 PROCEDURE — 84145 PROCALCITONIN (PCT): CPT

## 2021-07-16 PROCEDURE — 71045 X-RAY EXAM CHEST 1 VIEW: CPT

## 2021-07-16 PROCEDURE — 2580000003 HC RX 258: Performed by: STUDENT IN AN ORGANIZED HEALTH CARE EDUCATION/TRAINING PROGRAM

## 2021-07-16 PROCEDURE — 99233 SBSQ HOSP IP/OBS HIGH 50: CPT | Performed by: PSYCHIATRY & NEUROLOGY

## 2021-07-16 PROCEDURE — 6370000000 HC RX 637 (ALT 250 FOR IP): Performed by: NURSE PRACTITIONER

## 2021-07-16 PROCEDURE — 2580000003 HC RX 258: Performed by: PSYCHIATRY & NEUROLOGY

## 2021-07-16 PROCEDURE — 85025 COMPLETE CBC W/AUTO DIFF WBC: CPT

## 2021-07-16 PROCEDURE — 2580000003 HC RX 258: Performed by: NURSE PRACTITIONER

## 2021-07-16 PROCEDURE — APPNB60 APP NON BILLABLE TIME 46-60 MINS: Performed by: NURSE PRACTITIONER

## 2021-07-16 PROCEDURE — 2500000003 HC RX 250 WO HCPCS: Performed by: NURSE PRACTITIONER

## 2021-07-16 PROCEDURE — 80048 BASIC METABOLIC PNL TOTAL CA: CPT

## 2021-07-16 PROCEDURE — 36415 COLL VENOUS BLD VENIPUNCTURE: CPT

## 2021-07-16 PROCEDURE — 97129 THER IVNTJ 1ST 15 MIN: CPT

## 2021-07-16 PROCEDURE — 85055 RETICULATED PLATELET ASSAY: CPT

## 2021-07-16 PROCEDURE — 6370000000 HC RX 637 (ALT 250 FOR IP): Performed by: STUDENT IN AN ORGANIZED HEALTH CARE EDUCATION/TRAINING PROGRAM

## 2021-07-16 PROCEDURE — 6360000002 HC RX W HCPCS: Performed by: STUDENT IN AN ORGANIZED HEALTH CARE EDUCATION/TRAINING PROGRAM

## 2021-07-16 RX ORDER — MAGNESIUM SULFATE IN WATER 40 MG/ML
2000 INJECTION, SOLUTION INTRAVENOUS ONCE
Status: COMPLETED | OUTPATIENT
Start: 2021-07-16 | End: 2021-07-16

## 2021-07-16 RX ORDER — QUETIAPINE FUMARATE 25 MG/1
12.5 TABLET, FILM COATED ORAL NIGHTLY
Status: DISCONTINUED | OUTPATIENT
Start: 2021-07-16 | End: 2021-07-18

## 2021-07-16 RX ORDER — CYCLOBENZAPRINE HCL 10 MG
5 TABLET ORAL ONCE
Status: COMPLETED | OUTPATIENT
Start: 2021-07-16 | End: 2021-07-16

## 2021-07-16 RX ADMIN — QUETIAPINE FUMARATE 12.5 MG: 25 TABLET ORAL at 20:35

## 2021-07-16 RX ADMIN — SODIUM CHLORIDE, PRESERVATIVE FREE 10 ML: 5 INJECTION INTRAVENOUS at 20:35

## 2021-07-16 RX ADMIN — CEPHALEXIN 500 MG: 500 CAPSULE ORAL at 20:35

## 2021-07-16 RX ADMIN — CYCLOBENZAPRINE 5 MG: 10 TABLET, FILM COATED ORAL at 14:49

## 2021-07-16 RX ADMIN — FAMOTIDINE 20 MG: 20 TABLET, FILM COATED ORAL at 20:35

## 2021-07-16 RX ADMIN — CHOLESTYRAMINE 4 G: 4 POWDER, FOR SUSPENSION ORAL at 09:28

## 2021-07-16 RX ADMIN — CHOLESTYRAMINE 4 G: 4 POWDER, FOR SUSPENSION ORAL at 20:36

## 2021-07-16 RX ADMIN — SODIUM CHLORIDE, PRESERVATIVE FREE 10 ML: 5 INJECTION INTRAVENOUS at 09:28

## 2021-07-16 RX ADMIN — ZIPRASIDONE MESYLATE 10 MG: 20 INJECTION, POWDER, LYOPHILIZED, FOR SOLUTION INTRAMUSCULAR at 00:58

## 2021-07-16 RX ADMIN — LOSARTAN POTASSIUM 100 MG: 50 TABLET, FILM COATED ORAL at 09:28

## 2021-07-16 RX ADMIN — FAMOTIDINE 20 MG: 20 TABLET, FILM COATED ORAL at 09:29

## 2021-07-16 RX ADMIN — DEXAMETHASONE SODIUM PHOSPHATE 4 MG: 4 INJECTION, SOLUTION INTRAMUSCULAR; INTRAVENOUS at 09:28

## 2021-07-16 RX ADMIN — CAFFEINE AND SODIUM BENZOATE 250 MG: 125 INJECTION, SOLUTION INTRAMUSCULAR; INTRAVENOUS at 15:57

## 2021-07-16 RX ADMIN — DEXAMETHASONE SODIUM PHOSPHATE 4 MG: 4 INJECTION, SOLUTION INTRAMUSCULAR; INTRAVENOUS at 01:33

## 2021-07-16 RX ADMIN — WATER 1.2 ML: 1 INJECTION INTRAMUSCULAR; INTRAVENOUS; SUBCUTANEOUS at 00:58

## 2021-07-16 RX ADMIN — MAGNESIUM SULFATE 2000 MG: 2 INJECTION INTRAVENOUS at 14:53

## 2021-07-16 RX ADMIN — CEPHALEXIN 500 MG: 500 CAPSULE ORAL at 09:28

## 2021-07-16 RX ADMIN — AMLODIPINE BESYLATE 10 MG: 10 TABLET ORAL at 09:28

## 2021-07-16 RX ADMIN — HYDROCHLOROTHIAZIDE 25 MG: 25 TABLET ORAL at 13:28

## 2021-07-16 RX ADMIN — ENOXAPARIN SODIUM 40 MG: 40 INJECTION SUBCUTANEOUS at 09:28

## 2021-07-16 RX ADMIN — DEXAMETHASONE SODIUM PHOSPHATE 4 MG: 4 INJECTION, SOLUTION INTRAMUSCULAR; INTRAVENOUS at 17:06

## 2021-07-16 ASSESSMENT — PAIN DESCRIPTION - LOCATION
LOCATION: HEAD
LOCATION: HEAD

## 2021-07-16 ASSESSMENT — PAIN SCALES - WONG BAKER: WONGBAKER_NUMERICALRESPONSE: 4

## 2021-07-16 ASSESSMENT — PAIN DESCRIPTION - PAIN TYPE
TYPE: ACUTE PAIN
TYPE: ACUTE PAIN

## 2021-07-16 ASSESSMENT — PAIN DESCRIPTION - ORIENTATION: ORIENTATION: POSTERIOR

## 2021-07-16 ASSESSMENT — PAIN SCALES - GENERAL: PAINLEVEL_OUTOF10: 8

## 2021-07-16 ASSESSMENT — PAIN - FUNCTIONAL ASSESSMENT: PAIN_FUNCTIONAL_ASSESSMENT: 0-10

## 2021-07-16 NOTE — PROGRESS NOTES
Occupational Therapy  Facility/Department: [facility] NEURO  Daily Treatment Note  NAME: Justo Jenkins  : 1940  MRN: [id number]    Date of Service: 2021    Discharge Recommendations:  Patient would benefit from continued therapy after discharge  OT Equipment Recommendations  ADL Assistive Devices: Shower Chair with back    Assessment   Performance deficits / Impairments: Decreased functional mobility ; Decreased endurance;Decreased coordination;Decreased ADL status; Decreased sensation;Decreased posture;Decreased ROM; Decreased balance;Decreased strength;Decreased vision/visual deficit; Decreased safe awareness;Decreased high-level IADLs;Decreased fine motor control;Decreased cognition  Prognosis: Good  Patient Education: Pt ed on OT role, POC, importance of OOB activity, pursed lip breathing, EC/WS, visual scanning strategies- F/G return  Barriers to Learning: Decreased cognition  REQUIRES OT FOLLOW UP: Yes  Activity Tolerance  Activity Tolerance: Patient limited by fatigue;Patient limited by pain;Treatment limited secondary to medical complications (free text)  Activity Tolerance: Increased BP  Safety Devices  Safety Devices in place: Yes  Type of devices: Call light within reach;Nurse notified;Gait belt;Left in bed;Bed alarm in place  Restraints  Initially in place: No         Patient Diagnosis(es): The encounter diagnosis was Intracranial hemorrhage ([facility] [address] 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 has a past surgical history that includes Appendectomy;  Cholecystectomy; hernia repair; Tonsillectomy; Tubal ligation; and Hysterectomy. Restrictions  Restrictions/Precautions  Restrictions/Precautions: General Precautions, Up as Tolerated, Seizure  Required Braces or Orthoses?: No  Position Activity Restriction  Other position/activity restrictions: SBP <160  Subjective   General  Chart Reviewed: Yes  Patient assessed for rehabilitation services?: Yes  Family / Caregiver Present: No  General Comment  Comments: RN ok'd pt for OT/PT, pt pleasant and agreeable to tx this date. BP measured throughout tx. 158/72 supine, 166/92 sitting EOB, 169/98 after standing, 155/90 when returned to supine  Pain Assessment  Pain Level: 8  Pain Type: Acute pain  Pain Location: Head  Pain Orientation: Posterior  Non-Pharmaceutical Pain Intervention(s): Ambulation/Increased Activity; Emotional support; Therapeutic presence  Vital Signs  Patient Currently in Pain: Yes   Orientation  Orientation  Overall Orientation Status: Within Functional Limits  Objective    ADL  UE Dressing: Setup;Verbal cueing; Increased time to complete; Moderate assistance (Mod A to adjust and tie gown sitting EOB d/t decreased sitting balance)  LE Dressing: Setup;Verbal cueing; Increased time to complete;Maximum assistance (Don/doff socks sitting EOB. Pt attempted 4 figure technique with P return. Max A d/t decreased sitting balance)        Balance  Sitting Balance: Minimal assistance (Pt Min-Mod A for sitting balance this date. Pt displays heavy L lateral lean. Tolerating approx 12 min EOB)  Standing Balance: Moderate assistance (Mod A x2)  Standing Balance  Time: Approx 1 min  Activity: Standing in SS  Comment: Heavy L lateral lean, pt reports feeling nauseous when standing, returned to EOB  Bed mobility  Rolling to Left: Moderate assistance;2 Person assistance  Rolling to Right: Moderate assistance;2 Person assistance  Supine to Sit: Maximum assistance (Max A x1)  Sit to Supine:  Moderate assistance;2 Person assistance  Scooting: Moderate assistance  Comment: Pt req assistance for BLE and trunk progression this date  Transfers  Sit to stand: 2 Person assistance;Maximum assistance  Stand to sit: 2 Person assistance;Maximum assistance  Transfer Comments: with SS     Cognition  Overall Cognitive Status: Exceptions  Arousal/Alertness: Appropriate responses to stimuli  Following Commands: Follows one step commands with increased time; Follows one step commands with repetition  Attention Span: Attends with cues to redirect  Safety Judgement: Decreased awareness of need for assistance;Decreased awareness of need for safety  Problem Solving: Assistance required to generate solutions;Assistance required to implement solutions;Decreased awareness of errors  Insights: Decreased awareness of deficits  Initiation: Requires cues for some  Sequencing: Requires cues for some     Plan   Plan  Times per week: 4-5x/wk  Current Treatment Recommendations: Strengthening, Patient/Caregiver Education & Training, Home Management Training, ROM, Equipment Evaluation, Education, & procurement, Balance Training, Neuromuscular Re-education, Functional Mobility Training, Positioning, Endurance Training, Cognitive/Perceptual Training, Safety Education & Training, Self-Care / ADL    Goals  Short term goals  Time Frame for Short term goals: Patient will, by discharge:  Short term goal 1: demo UB ADLs at min A with <3 VC  Short term goal 2: demo LB ADLs at mod A with <3 VC  Short term goal 3: locate supplies on L side with <2 VC to visually scan to increase participation in ADL tasks  Short term goal 4: demo safety awareness with <3 VC to increase participation in functional tasks  Short term goal 5: incorporate LUE during ADL tasks with mod A to increase functional ROM and strength  Short term goal 6: demo functional transfers/mobility at mod A to increase participation in functional tasks  Short term goal 7: demo 10+ minutes of proprioceptive input to the L extremities to facilitate neuromuscular re-education to improve functional use       Therapy Time   Individual Concurrent Group Co-treatment   Time In 1002         Time Out 1034         Minutes 32         Timed Code Treatment Minutes: 23 Minutes (9 min co-tx PT)   Pt in bed upon arrival, pleasant and agreeable to tx this date.  Pt retired to bed at end of session, call light within reach, PT present at [facility], [facility]/

## 2021-07-16 NOTE — PROGRESS NOTES
Physical Therapy  Facility/Department: [facility] NEURO  Daily Treatment Note  NAME: Dallin Salinas  : 1940  MRN: [id number]    Date of Service: 2021    Discharge Recommendations:  Patient would benefit from continued therapy after discharge   PT Equipment Recommendations  Equipment Needed: No    Assessment   Body structures, Functions, Activity limitations: Decreased functional mobility ; Decreased strength;Decreased safe awareness;Decreased balance;Decreased sensation;Decreased endurance;Decreased ROM  Assessment: Pt stood in suzanne steady x 1 min, requiring MOD-MAX Ax2 for all mobility. Recommend continued PT after d/c to address deficits. Prognosis: Fair  No Skilled PT: Independent with functional mobility   Activity Tolerance  Activity Tolerance: Patient Tolerated treatment well;Patient limited by endurance; Patient limited by fatigue  Activity Tolerance: Pt activity tolerance was limited from increase BP and feeling dizzy. Patient Diagnosis(es): The encounter diagnosis was Intracranial hemorrhage ([facility] [address] 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 has a past surgical history that includes Appendectomy; Cholecystectomy; hernia repair; Tonsillectomy; Tubal ligation; and Hysterectomy.     Restrictions  Restrictions/Precautions  Restrictions/Precautions: General Precautions, Up as Tolerated, Seizure  Required Braces or Orthoses?: No  Position Activity Restriction  Other position/activity restrictions: SBP <160  Subjective   General  Response To Previous Treatment: Patient with no complaints from previous session. Family / Caregiver Present: No  Subjective  Subjective: Pt agreeable to PT. Sitting EOB with OT upon arrival.  Pain Screening  Patient Currently in Pain: Yes  Pain Assessment  Pain Assessment: Faces  Dominguez-Baker Pain Rating: Hurts little more  Pain Type: Acute pain  Pain Location: Head  Response to Pain Intervention: Patient Satisfied  Vital Signs  Patient Currently in Pain: Yes  Pre Treatment Pain Screening  Intervention List: Patient able to continue with treatment    Orientation  Orientation  Overall Orientation Status: Within Functional Limits  Cognition      Objective   Bed mobility  Rolling to Left: Moderate assistance;2 Person assistance  Rolling to Right: Moderate assistance;2 Person assistance  Sit to Supine: Moderate assistance;2 Person assistance  Transfers  Sit to Stand: Maximum Assistance;2 Person Assistance  Stand to sit: Maximum Assistance;2 Person Assistance  Comment: sit to stand x 1 trial in suzanne steady  Ambulation  Ambulation?: No     Balance  Posture: Poor  Sitting - Static: Fair;-  Sitting - Dynamic: Fair;-  Standing - Static: Poor  Standing - Dynamic: Poor (Pt stood in suzanne steady x 1 min, requiring MAX A to maintain due to post and lateral lean. C/o dizziness and nausea, returned to supine.  Bp 155/90.)    Exercise  L LE and UE AAROM in all planes x 10  Goals  Short term goals  Time Frame for Short term goals: 12 visits  Short term goal 1: Perform bed mob Ariella+1  Short term goal 2: Perform transfers modA+1  Short term goal 3: Ambulate 22' modA+2 w/ appropriate device  Short term goal 4: Dangle EOB ~15 min SBA  Short term goal 5: Facilitate active movement LUE/LLE  Patient Goals   Patient goals : Go home    Plan    Plan  Times per week: 5-6 visits weekly  Times per day: Daily  Current Treatment Recommendations: Strengthening, ROM, Balance Training, Functional Mobility Training, Gait Training, Endurance Training, Transfer Training, Safety Education &

## 2021-07-16 NOTE — PROGRESS NOTES
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.5    Essential hypertension I10    Osteoarthritis M19.90    Chronic diarrhea K52.9    Thalamic hemorrhage with stroke ([facility] [address] I61.9    Intracranial hemorrhage ([address] I62.9    Hypertensive emergency I16.1    IVH (intraventricular hemorrhage) ([facility] [address] I61.5    Acute left hemiparesis (HCC) G81.94       Pain: 0/10    Cognitive Treatment    Treatment time: 7321-2738      Subjective: [x] Alert [x] Cooperative     [] Confused     [] Agitated    [] Lethargic      Objective/Assessment:    Recall: Delayed recall of 3 associated units: 3/3 independent     Problem Solving/Reasoning: Naming 7 members of a category: 15/21 increased to 21/21 with mod verbal cues. State the category - abstract: 4/8 increased to 8/8 with mod verbal cues. Pt. Seen for O/M exercise program for facial weakness. Pt. Completed exercises X 10 X 1 set with min verbal cues. Pt. With left facial weakness at this time. Exercise program left at bedside. Pt. Encouraged to complete exercises daily. Pt. Verbalized understanding. Plan:  [x] Continue ST services    [] Discharge from ST:      Discharge recommendations: [] Inpatient Rehab   [] [place]   [] Outpatient Therapy  [] Follow up at trauma clinic   [x] Other:  Further therapy recommended at discharge. Adri HAYES S.CCC/SLP

## 2021-07-16 NOTE — PROGRESS NOTES
Daily Progress Note  Neuro Critical Care    Patient Name: Sujey Torres  Patient : 1940  Room/Bed: 3504/3864-44  Code Status: Full  Allergies: Allergies   Allergen Reactions    Clonidine Derivatives     Morphine     Sulfa Antibiotics        CHIEF COMPLAINT:      ICH     INTERVAL HISTORY    Initial Presentation (Admitted 21): The patient is a 78 y. o. female with a history of HTN, HLD and CVA () who presented as a transfer from [address] with a right basal ganglia IPH. Patient initially presented to [facility] ED with acute onset left sided weakness. LKW around 1700, about 30 minutes prior to arrival at [facility]. Initial . Patient had stopped taking her antihypertensive medications 3 days prior. NIH 12.  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 Started on Cardene infusion for BP control. Transferred to [address] [place] [address] for further evaluation and management. On arrival to [address] [place] [address] ED, NIH 11. On Cardene infusion for BP control. CT Head stable. CTA Head/Neck unremarkable. Neurosurgery consulted. Admitted to Neuro ICU for close monitoring. Hospital Course:   : CT head stable. Home oral antihypertensives restarted. Febrile, UA sent. : Cardene infusion weaned off around 0300. Norvasc increased to 10 mg QD, remains off Cardene infusion. Discontinue Keppra, monitor off AEDs. Patient complaining of headache this morning, received Magnesium, caffeine and fluid bolus with some improvement. Headache continued this afternoon, will start Decadron 4mg q8h.   7/15: Mildly hypertensive intermittently, continue to monitor. Clinical exam stable. Discharge planning. Last 24h:  Hypertensive overnight with SBP into the 180's, received a dose of Labetalol and Hydralazine with some improvement. Patient and nursing reports she was very anxious last night which is thought to have contributed to elevated blood pressure.   She was given 10mg IV Geodon with improvement in anxiety and hypertension and she was able to sleep. BP trend so far this morning appears improved. Continue to follow and consider adding additional antihypertensive agent based on trend. Clinical exam stable. Patient complaining of headache this afternoon. Reports right posterior neck pain and right head pain. Flexeril 5mg PO given once for neck pain/muscle spasm. Will given another dose of Caffeine and Magnesium to help with headache. Decadron to continue through today. Case management working on discharge planning.       CURRENT MEDICATIONS:  SCHEDULED MEDICATIONS:   cephALEXin  500 mg Oral 2 times per day    cholestyramine light  4 g Oral BID    dexamethasone  4 mg Intravenous Q8H    famotidine  20 mg Oral BID    amLODIPine  10 mg Oral Daily    enoxaparin  40 mg Subcutaneous Daily    losartan  100 mg Oral Daily    And    hydroCHLOROthiazide  25 mg Oral Daily    sodium chloride flush  5-40 mL Intravenous 2 times per day     CONTINUOUS INFUSIONS:   sodium chloride       PRN MEDICATIONS:   diphenoxylate-atropine, labetalol, sodium chloride flush, sodium chloride, acetaminophen, ondansetron **OR** ondansetron    VITALS:  Temperature Range: Temp: 98 °F (36.7 °C) Temp  Av °F (36.7 °C)  Min: 97.7 °F (36.5 °C)  Max: 98.1 °F (36.7 °C)  BP Range: Systolic (70OFG), EXP:430 , Min:109 , YHI:754     Diastolic (35LNB), JOZ:20, Min:59, Max:139    Pulse Range: Pulse  Av.5  Min: 73  Max: 96  Respiration Range: Resp  Av.6  Min: 16  Max: 25  Current Pulse Ox: SpO2: 96 %  24HR Pulse Ox Range: SpO2  Av.8 %  Min: 91 %  Max: 98 %  Patient Vitals for the past 12 hrs:   BP Temp Temp src Pulse Resp SpO2   21 0500 109/64   73 21 96 %   21 0427 136/83   77 20 98 %   21 0400 (!) 173/83 98 °F (36.7 °C) Axillary 83 16 98 %   21 0015 (!) 155/90   88 22 95 %   21 0000 120/76   76 20 97 %   07/15/21 2300 (!) 185/139   96 19 94 %   07/15/21 ganglia hemorrhage with intraventricular   hemorrhage most likely representing rupture of the basal ganglia hemorrhage   into the ventricular system with asymmetric amount of blood in the right   lateral ventricle compared to the left. Blood fills the 3rd ventricle and   the sylvian aqueduct is also blood in the 4th ventricle. 2. Unremarkable CTA of the head. Findings were discussed with Dr. Elena Roy at 7:58 pm on 7/12/2021. CT HEAD WO CONTRAST   Final Result   1. Stable appearance to right basal ganglia hemorrhage with intraventricular   hemorrhage most likely representing rupture of the basal ganglia hemorrhage   into the ventricular system with asymmetric amount of blood in the right   lateral ventricle compared to the left. Blood fills the 3rd ventricle and   the sylvian aqueduct is also blood in the 4th ventricle. 2. Unremarkable CTA of the head. Findings were discussed with Dr. Elena Roy at 7:58 pm on 7/12/2021. XR CHEST PORTABLE    (Results Pending)     Labs and Images reviewed with:  [x] Dr. Austin Guadarrama. Evelina    [] Dr. Hermila Velasco  [] Dr. Ronel Maldonado  [] There are no new interval images to review. PHYSICAL EXAM       CONSTITUTIONAL:  Awakens briskly to voice. Oriented x3 but forgetful. In no acute distress. GCS 15. Nontoxic. Mild dysarthria. No aphasia. HEAD:  normocephalic, atraumatic    EYES:  PERRL, EOMI   ENT:  moist mucous membranes   NECK:  supple, symmetric   LUNGS:  Equal air entry bilaterally, clear   CARDIOVASCULAR:  normal s1 / s2, RRR, distal pulses intact   ABDOMEN:  Soft, no rigidity   NEUROLOGIC:  Mental Status:  Awakens briskly to voice. Oriented x3 but forgetful. Following commands.              Cranial Nerves:    II: Visual fields:  normal  III: Pupils:  equal, round, reactive to light  III,IV,VI: Extra Ocular Movements: intact  V: Facial sensation:  intact  VII: Facial strength: abnormal - left facial droop     Motor Exam:    Drift:  present - 0.64  - Monitor I&Os, adequate urine output  - No maintenance IVF, tolerating PO  - Replace electrolytes PRN  - Daily BMP     GI/NUTRITION:  NUTRITION:  ADULT DIET; Regular   - Passed speech bedside swallow eval; Easy to chew, thin liquids  - Tolerating diet  - Chronic diarrhea  - Resume home Cholestyramine 4g BID, Lomotil 2.5-0.025mg QID prn  - GI prophylaxis: Pepcid while on steroids only     ID:  - Afebrile, 36.7C  - Mild leukocytosis, WBC 13.6  - Procal 0.04; not suggestive of worsening infection  - UA consistent with UTI  - Continue Keflex 500mg BID, day 3/5 days for UTI  - Daily CBC     HEME:   - H&H 14.5/45.4  - Platelets count appears adequate  - Daily CBC     ENDOCRINE:  - Continue to monitor blood glucose, goal <180  - Hemoglobin A1C 6.3  - Glucose well controlled     OTHER:  - PT/OT/ST   - PM&R following; low level  - Case management following; referral sent to [address] [place] [address] (she lives in their assisted living)  - Code Status: FULL     PROPHYLAXIS:  Stress ulcer: Pepcid with steroids     DVT PROPHYLAXIS:  - SCD sleeves - Thigh High   - Lovenox 40mg QD     DISPOSITION:  [x] Stepdown status. We will continue to follow along. For any changes in exam or patient status please contact Neuro Critical Care.       Mark Atkinson, APRN - [place]  Neuro Critical Care  Pager [phone]  7/16/2021     7:52 AM

## 2021-07-16 NOTE — PROGRESS NOTES
0 - writer notified Anjelica Marie MD regarding patients SBP trending >160 and patient complaints of headache. Writer gave labetalol and tylenol at 2041 and patient showed no signs of relief. [facility] ordered one time dose of 10 mg hydralazine an 25 mcg of fentanyl   2318 - patients BP dropped to 137/89 and then increased to 185/139.  Patient is complaining of being extremely nervous and scared  [address] ordered Geodon for anxiety  2061 - writer gave patient Geodon, patient now trending <160 SBP

## 2021-07-16 NOTE — PLAN OF CARE
Problem: Falls - Risk of:  Goal: Will remain free from falls  Description: Will remain free from falls  Outcome: Ongoing  Goal: Absence of physical injury  Description: Absence of physical injury  Outcome: Ongoing     Problem: HEMODYNAMIC STATUS  Goal: Patient has stable vital signs and fluid balance  Outcome: Ongoing     Problem: ACTIVITY INTOLERANCE/IMPAIRED MOBILITY  Goal: Mobility/activity is maintained at optimum level for patient  Outcome: Ongoing     Problem: COMMUNICATION IMPAIRMENT  Goal: Ability to express needs and understand communication  Outcome: Ongoing     Problem: Coping:  Goal: Ability to cope will improve  Description: Ability to cope will improve  Outcome: Ongoing  Goal: Ability to identify appropriate support needs will improve  Description: Ability to identify appropriate support needs will improve  Outcome: Ongoing     Problem: Health Behavior:  Goal: Ability to manage health-related needs will improve  Description: Ability to manage health-related needs will improve  Outcome: Ongoing     Problem: Physical Regulation:  Goal: Signs of adequate cerebral perfusion will increase  Description: Signs of adequate cerebral perfusion will increase  Outcome: Ongoing  Goal: Ability to maintain a stable neurologic state will improve  Description: Ability to maintain a stable neurologic state will improve  Outcome: Ongoing     Problem: Safety:  Goal: Ability to remain free from injury will improve  Description: Ability to remain free from injury will improve  Outcome: Ongoing     Problem: Self-Concept:  Goal: Level of anxiety will decrease  Description: Level of anxiety will decrease  Outcome: Ongoing  Goal: Ability to verbalize feelings about condition will improve  Description: Ability to verbalize feelings about condition will improve  Outcome: Ongoing     Problem: Pain:  Goal: Pain level will decrease  Description: Pain level will decrease  Outcome: Ongoing  Goal: Control of acute pain  Description: Control of acute pain  Outcome: Ongoing  Goal: Control of chronic pain  Description: Control of chronic pain  Outcome: Ongoing     Problem: Skin Integrity:  Goal: Will show no infection signs and symptoms  Description: Will show no infection signs and symptoms  Outcome: Ongoing  Goal: Absence of new skin breakdown  Description: Absence of new skin breakdown  Outcome: Ongoing

## 2021-07-17 LAB
ABSOLUTE EOS #: <0.03 K/UL (ref 0–0.44)
ABSOLUTE IMMATURE GRANULOCYTE: 0.05 K/UL (ref 0–0.3)
ABSOLUTE LYMPH #: 1.24 K/UL (ref 1.1–3.7)
ABSOLUTE MONO #: 0.77 K/UL (ref 0.1–1.2)
ANION GAP SERPL CALCULATED.3IONS-SCNC: 13 MMOL/L (ref 9–17)
BASOPHILS # BLD: 0 % (ref 0–2)
BASOPHILS ABSOLUTE: <0.03 K/UL (ref 0–0.2)
BUN BLDV-MCNC: 25 MG/DL (ref 8–23)
BUN/CREAT BLD: ABNORMAL (ref 9–20)
CALCIUM SERPL-MCNC: 8.8 MG/DL (ref 8.6–10.4)
CHLORIDE BLD-SCNC: 103 MMOL/L (ref 98–107)
CO2: 22 MMOL/L (ref 20–31)
CREAT SERPL-MCNC: 0.69 MG/DL (ref 0.5–0.9)
DIFFERENTIAL TYPE: ABNORMAL
EOSINOPHILS RELATIVE PERCENT: 0 % (ref 1–4)
GFR AFRICAN AMERICAN: >60 ML/MIN
GFR NON-AFRICAN AMERICAN: >60 ML/MIN
GFR SERPL CREATININE-BSD FRML MDRD: ABNORMAL ML/MIN/{1.73_M2}
GFR SERPL CREATININE-BSD FRML MDRD: ABNORMAL ML/MIN/{1.73_M2}
GLUCOSE BLD-MCNC: 142 MG/DL (ref 70–99)
HCT VFR BLD CALC: 42.3 % (ref 36.3–47.1)
HEMOGLOBIN: 13.7 G/DL (ref 11.9–15.1)
IMMATURE GRANULOCYTES: 0 %
LYMPHOCYTES # BLD: 11 % (ref 24–43)
MCH RBC QN AUTO: 30 PG (ref 25.2–33.5)
MCHC RBC AUTO-ENTMCNC: 32.4 G/DL (ref 28.4–34.8)
MCV RBC AUTO: 92.8 FL (ref 82.6–102.9)
MONOCYTES # BLD: 7 % (ref 3–12)
NRBC AUTOMATED: 0 PER 100 WBC
PDW BLD-RTO: 13.8 % (ref 11.8–14.4)
PLATELET # BLD: 289 K/UL (ref 138–453)
PLATELET ESTIMATE: ABNORMAL
PMV BLD AUTO: 10.6 FL (ref 8.1–13.5)
POTASSIUM SERPL-SCNC: 3.7 MMOL/L (ref 3.7–5.3)
RBC # BLD: 4.56 M/UL (ref 3.95–5.11)
RBC # BLD: ABNORMAL 10*6/UL
SEG NEUTROPHILS: 82 % (ref 36–65)
SEGMENTED NEUTROPHILS ABSOLUTE COUNT: 9.36 K/UL (ref 1.5–8.1)
SODIUM BLD-SCNC: 138 MMOL/L (ref 135–144)
WBC # BLD: 11.4 K/UL (ref 3.5–11.3)
WBC # BLD: ABNORMAL 10*3/UL

## 2021-07-17 PROCEDURE — 6360000002 HC RX W HCPCS: Performed by: STUDENT IN AN ORGANIZED HEALTH CARE EDUCATION/TRAINING PROGRAM

## 2021-07-17 PROCEDURE — 6370000000 HC RX 637 (ALT 250 FOR IP): Performed by: STUDENT IN AN ORGANIZED HEALTH CARE EDUCATION/TRAINING PROGRAM

## 2021-07-17 PROCEDURE — 2500000003 HC RX 250 WO HCPCS: Performed by: STUDENT IN AN ORGANIZED HEALTH CARE EDUCATION/TRAINING PROGRAM

## 2021-07-17 PROCEDURE — 99233 SBSQ HOSP IP/OBS HIGH 50: CPT | Performed by: PSYCHIATRY & NEUROLOGY

## 2021-07-17 PROCEDURE — 6370000000 HC RX 637 (ALT 250 FOR IP): Performed by: FAMILY MEDICINE

## 2021-07-17 PROCEDURE — 3E0T3BZ INTRODUCTION OF ANESTHETIC AGENT INTO PERIPHERAL NERVES AND PLEXI, PERCUTANEOUS APPROACH: ICD-10-PCS | Performed by: FAMILY MEDICINE

## 2021-07-17 PROCEDURE — 6360000002 HC RX W HCPCS: Performed by: NURSE PRACTITIONER

## 2021-07-17 PROCEDURE — 6370000000 HC RX 637 (ALT 250 FOR IP): Performed by: NURSE PRACTITIONER

## 2021-07-17 PROCEDURE — 85025 COMPLETE CBC W/AUTO DIFF WBC: CPT

## 2021-07-17 PROCEDURE — 80048 BASIC METABOLIC PNL TOTAL CA: CPT

## 2021-07-17 PROCEDURE — 36415 COLL VENOUS BLD VENIPUNCTURE: CPT

## 2021-07-17 PROCEDURE — 2580000003 HC RX 258: Performed by: PSYCHIATRY & NEUROLOGY

## 2021-07-17 PROCEDURE — 2060000000 HC ICU INTERMEDIATE R&B

## 2021-07-17 PROCEDURE — 6370000000 HC RX 637 (ALT 250 FOR IP): Performed by: PSYCHIATRY & NEUROLOGY

## 2021-07-17 RX ORDER — HYDRALAZINE HYDROCHLORIDE 25 MG/1
25 TABLET, FILM COATED ORAL 2 TIMES DAILY
Status: DISCONTINUED | OUTPATIENT
Start: 2021-07-17 | End: 2021-07-23

## 2021-07-17 RX ORDER — HYDRALAZINE HYDROCHLORIDE 50 MG/1
50 TABLET, FILM COATED ORAL NIGHTLY
Status: DISCONTINUED | OUTPATIENT
Start: 2021-07-17 | End: 2021-07-22

## 2021-07-17 RX ORDER — CYCLOBENZAPRINE HCL 10 MG
5 TABLET ORAL ONCE
Status: COMPLETED | OUTPATIENT
Start: 2021-07-17 | End: 2021-07-17

## 2021-07-17 RX ORDER — DIPHENHYDRAMINE HYDROCHLORIDE 50 MG/ML
25 INJECTION INTRAMUSCULAR; INTRAVENOUS ONCE
Status: COMPLETED | OUTPATIENT
Start: 2021-07-17 | End: 2021-07-17

## 2021-07-17 RX ORDER — METHYLPREDNISOLONE ACETATE 40 MG/ML
80 INJECTION, SUSPENSION INTRA-ARTICULAR; INTRALESIONAL; INTRAMUSCULAR; SOFT TISSUE ONCE
Status: COMPLETED | OUTPATIENT
Start: 2021-07-17 | End: 2021-07-17

## 2021-07-17 RX ORDER — PROCHLORPERAZINE EDISYLATE 5 MG/ML
10 INJECTION INTRAMUSCULAR; INTRAVENOUS ONCE
Status: COMPLETED | OUTPATIENT
Start: 2021-07-17 | End: 2021-07-17

## 2021-07-17 RX ORDER — LIDOCAINE HYDROCHLORIDE 10 MG/ML
5 INJECTION, SOLUTION EPIDURAL; INFILTRATION; INTRACAUDAL; PERINEURAL ONCE
Status: COMPLETED | OUTPATIENT
Start: 2021-07-17 | End: 2021-07-17

## 2021-07-17 RX ADMIN — HYDROMORPHONE HYDROCHLORIDE 0.5 MG: 1 INJECTION, SOLUTION INTRAMUSCULAR; INTRAVENOUS; SUBCUTANEOUS at 21:06

## 2021-07-17 RX ADMIN — HYDRALAZINE HYDROCHLORIDE 50 MG: 50 TABLET, FILM COATED ORAL at 21:06

## 2021-07-17 RX ADMIN — CEPHALEXIN 500 MG: 500 CAPSULE ORAL at 21:07

## 2021-07-17 RX ADMIN — SODIUM CHLORIDE, PRESERVATIVE FREE 10 ML: 5 INJECTION INTRAVENOUS at 08:25

## 2021-07-17 RX ADMIN — HYDROCHLOROTHIAZIDE 25 MG: 25 TABLET ORAL at 13:14

## 2021-07-17 RX ADMIN — FAMOTIDINE 20 MG: 20 TABLET, FILM COATED ORAL at 08:25

## 2021-07-17 RX ADMIN — ACETAMINOPHEN 650 MG: 325 TABLET ORAL at 05:10

## 2021-07-17 RX ADMIN — AMLODIPINE BESYLATE 10 MG: 10 TABLET ORAL at 08:24

## 2021-07-17 RX ADMIN — QUETIAPINE FUMARATE 12.5 MG: 25 TABLET ORAL at 21:05

## 2021-07-17 RX ADMIN — DIPHENHYDRAMINE HYDROCHLORIDE 25 MG: 50 INJECTION INTRAMUSCULAR; INTRAVENOUS at 21:06

## 2021-07-17 RX ADMIN — MAGNESIUM GLUCONATE 500 MG ORAL TABLET 400 MG: 500 TABLET ORAL at 18:27

## 2021-07-17 RX ADMIN — HYDRALAZINE HYDROCHLORIDE 25 MG: 25 TABLET ORAL at 13:14

## 2021-07-17 RX ADMIN — SODIUM CHLORIDE, PRESERVATIVE FREE 10 ML: 5 INJECTION INTRAVENOUS at 21:05

## 2021-07-17 RX ADMIN — LOSARTAN POTASSIUM 100 MG: 50 TABLET, FILM COATED ORAL at 08:24

## 2021-07-17 RX ADMIN — PROCHLORPERAZINE EDISYLATE 10 MG: 5 INJECTION INTRAMUSCULAR; INTRAVENOUS at 21:05

## 2021-07-17 RX ADMIN — CHOLESTYRAMINE 4 G: 4 POWDER, FOR SUSPENSION ORAL at 21:06

## 2021-07-17 RX ADMIN — CHOLESTYRAMINE 4 G: 4 POWDER, FOR SUSPENSION ORAL at 08:25

## 2021-07-17 RX ADMIN — CYCLOBENZAPRINE 5 MG: 10 TABLET, FILM COATED ORAL at 08:24

## 2021-07-17 RX ADMIN — LIDOCAINE HYDROCHLORIDE 5 ML: 10 INJECTION, SOLUTION EPIDURAL; INFILTRATION; INTRACAUDAL; PERINEURAL at 18:26

## 2021-07-17 RX ADMIN — ACETAMINOPHEN 650 MG: 325 TABLET ORAL at 19:55

## 2021-07-17 RX ADMIN — METHYLPREDNISOLONE ACETATE 80 MG: 40 INJECTION, SUSPENSION INTRA-ARTICULAR; INTRALESIONAL; INTRAMUSCULAR; SOFT TISSUE at 18:26

## 2021-07-17 RX ADMIN — ENOXAPARIN SODIUM 40 MG: 40 INJECTION SUBCUTANEOUS at 08:25

## 2021-07-17 RX ADMIN — CEPHALEXIN 500 MG: 500 CAPSULE ORAL at 08:24

## 2021-07-17 ASSESSMENT — PAIN DESCRIPTION - PAIN TYPE
TYPE: ACUTE PAIN

## 2021-07-17 ASSESSMENT — PAIN DESCRIPTION - ORIENTATION
ORIENTATION: POSTERIOR

## 2021-07-17 ASSESSMENT — PAIN DESCRIPTION - LOCATION
LOCATION: HEAD

## 2021-07-17 ASSESSMENT — PAIN SCALES - GENERAL
PAINLEVEL_OUTOF10: 0
PAINLEVEL_OUTOF10: 0
PAINLEVEL_OUTOF10: 10
PAINLEVEL_OUTOF10: 10
PAINLEVEL_OUTOF10: 3

## 2021-07-17 ASSESSMENT — PAIN DESCRIPTION - DESCRIPTORS: DESCRIPTORS: HEADACHE

## 2021-07-17 ASSESSMENT — PAIN DESCRIPTION - FREQUENCY: FREQUENCY: CONTINUOUS

## 2021-07-17 ASSESSMENT — PAIN DESCRIPTION - ONSET: ONSET: ON-GOING

## 2021-07-17 NOTE — PROGRESS NOTES
Procedure Note    Procedure: Bilateral Greater and Lesser Occipital Nerve Block (CPT code 61744, 60625)    Indications:  severe bilateral occipital neuralgia (ICD 10 M54.81)    Informed consent was obtained (explaining the procedure and risks and benefits) from patient. Verbal consent was obtained    A time out was completed, verifying correct patient, procedure,site, positioning, and implants or special equipment. Patient's left occipital area was palpated to identify location of the greater and the lesser occipital nerve. Alcohol was applied topically to the skin. Using a 27 gauge needle (aspirating during insertion), 1.5 ml of 1% lidocaine (from a 5 ml bottle) and 1 ml (40 mg) of methylprednisolone was aspirated. 2.5 ml was injected on the greater and lesser occipital nerve on the left side (directing needle to center, left and right of painful focus). Pressure with a gauze pad was held briefly upon the site of puncture to minimize bleeding and to further spread anaesthetic subcutaneously. The procedure was repeated on the right side. There were no complications. Patient was comfortable and denies any acute complaint. Empty vial of methylprednisolone was discarded. Supervising Senior Resident, Gayt, present entire time.     Nena Puente MD  Neurology PGY-3  Neuro-critical Care Service  07/17/21

## 2021-07-17 NOTE — PLAN OF CARE
Problem: Falls - Risk of:  Goal: Will remain free from falls  Description: Will remain free from falls  Goal: Absence of physical injury  Description: Absence of physical injury     Problem: HEMODYNAMIC STATUS  Goal: Patient has stable vital signs and fluid balance  7/17/2021 0435 by Sita Herrera RN  Outcome: Ongoing  Problem: ACTIVITY INTOLERANCE/IMPAIRED MOBILITY  Goal: Mobility/activity is maintained at optimum level for patient  7/17/2021 0435 by Sita Herrera RN  Outcome: Ongoing  Neuro assessment completed, fall precautions in place, aspirations precautions in place, assess for barriers in communication and mobility, interventions for mobility in place, encouraged to call for assistance, adaptive devices used as needed, assess emotional state and support offered, and support systems included in patient care.

## 2021-07-17 NOTE — PROGRESS NOTES
with improvement in anxiety and hypertension and she was able to sleep. BP trend so far this morning appears improved. Continue to follow and consider adding additional antihypertensive agent based on trend. Clinical exam stable. Patient complaining of headache this afternoon. Reports right posterior neck pain and right head pain. Flexeril 5mg PO given once for neck pain/muscle spasm. Will given another dose of Caffeine and Magnesium to help with headache. Decadron to continue through today. Case management working on discharge planning. Last 24h:  Report of possible owning, received seroquel. This morning, reports a headache on R frontal with associated R sided neck pain. Reports relief from flexeril dose yesterday.     CURRENT MEDICATIONS:  SCHEDULED MEDICATIONS:   cyclobenzaprine  5 mg Oral Once    QUEtiapine  12.5 mg Oral Nightly    cephALEXin  500 mg Oral 2 times per day    cholestyramine light  4 g Oral BID    famotidine  20 mg Oral BID    amLODIPine  10 mg Oral Daily    enoxaparin  40 mg Subcutaneous Daily    losartan  100 mg Oral Daily    And    hydroCHLOROthiazide  25 mg Oral Daily    sodium chloride flush  5-40 mL Intravenous 2 times per day     CONTINUOUS INFUSIONS:   sodium chloride       PRN MEDICATIONS:   diphenoxylate-atropine, labetalol, sodium chloride flush, sodium chloride, acetaminophen, ondansetron **OR** ondansetron    VITALS:  Temperature Range: Temp: 97.8 °F (36.6 °C) Temp  Av °F (36.7 °C)  Min: 97.5 °F (36.4 °C)  Max: 98.5 °F (36.9 °C)  BP Range: Systolic (75BPJ), GKP:124 , Min:111 , OAK:723     Diastolic (02NGY), CDV:11, Min:55, Max:90    Pulse Range: Pulse  Av.7  Min: 68  Max: 93  Respiration Range: Resp  Av.1  Min: 15  Max: 21  Current Pulse Ox: SpO2: 94 %  24HR Pulse Ox Range: SpO2  Av %  Min: 92 %  Max: 96 %  Patient Vitals for the past 12 hrs:   BP Temp Temp src Pulse Resp SpO2   21 0745 (!) 152/74 97.8 °F (36.6 °C) Oral 77 18 94 % 07/17/21 0400 (!) 164/68 98.5 °F (36.9 °C) Oral 82 20    07/17/21 0000 (!) 111/55 98.4 °F (36.9 °C) Oral 68 19 92 %     Estimated body mass index is 30.72 kg/m² as calculated from the following:    Height as of this encounter: 4' 9\" (1.448 m). Weight as of this encounter: 141 lb 15.6 oz (64.4 kg).  []<16 Severe malnutrition  []1616.99 Moderate malnutrition  []1718.49 Mild malnutrition  []18.524.9 Normal  []2529.9 Overweight (not obese)  [x]3034.9 Obese class 1 (Low Risk)  []3539.9 Obese class 2 (Moderate Risk)  []?40 Obese class 3 (High Risk)    RECENT LABS:   Lab Results   Component Value Date    WBC 11.4 (H) 07/17/2021    HGB 13.7 07/17/2021    HCT 42.3 07/17/2021     07/17/2021    CHOL 144 07/13/2021    TRIG 194 (H) 07/13/2021    HDL 49 07/13/2021    ALT 35 (H) 07/12/2021    AST 27 07/12/2021     07/17/2021    K 3.7 07/17/2021     07/17/2021    CREATININE 0.69 07/17/2021    BUN 25 (H) 07/17/2021    CO2 22 07/17/2021    TSH 1.350 02/04/2021    INR 1.0 07/12/2021    LABA1C 6.3 (H) 07/13/2021     24 HOUR INTAKE/OUTPUT:    Intake/Output Summary (Last 24 hours) at 7/17/2021 0819  Last data filed at 7/17/2021 0400  Gross per 24 hour   Intake    Output 1300 ml   Net -1300 ml       IMAGING:   XR CHEST PORTABLE   Final Result   Essentially stable chest when compared to previous. Again the trachea is   somewhat ectatic and slightly displaced to the left which could be due to   positioning. As mentioned previously a CT scan could be performed if   clinically indicated. XR CHEST PORTABLE   Final Result   1. Prominence of the right paratracheal stripe which could be positional.   If clinically indicated, this could be further evaluated with a   contrast-enhanced CT chest.      2.  Mild interstitial prominence in the lungs, possibly chronic interstitial   change versus edema versus pneumonitis. CT head without contrast   Final Result   Stable examination.          CTA HEAD W CONTRAST   Final Result   1. Stable appearance to right basal ganglia hemorrhage with intraventricular   hemorrhage most likely representing rupture of the basal ganglia hemorrhage   into the ventricular system with asymmetric amount of blood in the right   lateral ventricle compared to the left. Blood fills the 3rd ventricle and   the sylvian aqueduct is also blood in the 4th ventricle. 2. Unremarkable CTA of the head. Findings were discussed with Dr. Carlos Salcido at 7:58 pm on 7/12/2021. CT HEAD WO CONTRAST   Final Result   1. Stable appearance to right basal ganglia hemorrhage with intraventricular   hemorrhage most likely representing rupture of the basal ganglia hemorrhage   into the ventricular system with asymmetric amount of blood in the right   lateral ventricle compared to the left. Blood fills the 3rd ventricle and   the sylvian aqueduct is also blood in the 4th ventricle. 2. Unremarkable CTA of the head. Findings were discussed with Dr. Carlos Salcido at 7:58 pm on 7/12/2021. Labs and Images reviewed with:  [x] Dr. Shameka Garcia. Evelina    [] Dr. Estefania Granado  [] Dr. Darwin Schwab  [] There are no new interval images to review. PHYSICAL EXAM       CONSTITUTIONAL:  Awakens to voice. Oriented x3 In no acute distress. GCS 15. Nontoxic. Mild dysarthria. No aphasia. HEAD:  normocephalic, atraumatic    EYES:  PERRL, EOMI   ENT:  moist mucous membranes   NECK:  supple, symmetric   LUNGS:  Equal air entry bilaterally, clear   CARDIOVASCULAR:  normal s1 / s2, RRR, distal pulses intact   ABDOMEN:  Soft, no rigidity   NEUROLOGIC:  Mental Status:  Awakens to voice. Oriented x3. Following commands.              Cranial Nerves:    II: Visual fields:  normal  III: Pupils:  equal, round, reactive to light  III,IV,VI: Extra Ocular Movements: intact  V: Facial sensation:  intact  VII: Facial strength: abnormal - left facial droop     Motor Exam:    Drift:  present - left upper and lower extremities  Tone:  Decreased left upper extremity     5/5 strength in the right upper and right lower extremities  0/5 strength in the left upper extremity, no movement to pain. Withdraws to pain in the left lower extremity        Sensory:    Touch:    Right Upper Extremity:  normal  Left Upper Extremity:  abnormal - decreased  Right Lower Extremity:  normal  Left Lower Extremity:  abnormal - decreased     DRAINS:  [x] There are no drains for Neuro Critical Care to monitor at this time. ASSESSMENT AND PLAN:       The patient is a 79 yo female with a history of  HTN, HLD and CVA (2004) who presented as a transfer from [facility] with a right basal ganglia IPH. initially presented to [facility] ED with acute onset left sided weakness. Found to be hypertensive with . CT Head revealed right basal ganglia IPH with intraventricular extension.       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 Karlee Culp  - Maria De Jesus Kent discontinued, monitor off AED  - Headache management; Decadron 4mg Q8h (total 3 days, completed 7/16), Will repeat Flexeril 5mg again, OOB mobility.  Reordered Mag oxide home dose of 250mg once daily however only 400mg available in pharmacy, substituted  - Goal SBP<160  - Neuro checks per protocol     CARDIOVASCULAR:  - Goal SBP<160  - PRN Labetalol  - Essential HTN  - Continue Losartan-HCTZ 100-25mg daily, Norvasc 10mg QD  - Added Hydralazine 25 mg in AM, 25mg in afternoon, 50mg at night  - Troponin 11, EKG sinus rhythm, left anterior fascicular block  - Echo EF 17-49%, grade I diastolic dysfunction  - Lipid panel; LDL 56, cholesterol 144 - no need for statin in setting of ICH  - Continue telemetry     PULMONARY:  - Maintaining O2 sats on 0-2L nasal canula  - Wean O2 as tolerates  - Incentive spirometry     RENAL/FLUID/ELECTROLYTE:  - Normal renal functioning  - BUN 25/ Creatinine 0.69  - Monitor I&Os, adequate urine output  - No maintenance IVF, tolerating PO  - Replace electrolytes PRN  - Daily BMP     GI/NUTRITION:  NUTRITION:  ADULT DIET; Regular   - Passed speech bedside swallow eval; Easy to chew, thin liquids  - Tolerating diet  - Chronic diarrhea  - Resume home Cholestyramine 4g BID, Lomotil 2.5-0.025mg QID prn  - GI prophylaxis: Pepcid while on steroids only     ID:  - Afebrile, 36.9C  - Mild leukocytosis, WBC downtrending 11.4 from 13.6  - Procal 0.04; not suggestive of worsening infection  - UA consistent with UTI  - Continue Keflex 500mg BID, day 4/5 days for UTI  - Daily CBC     HEME:   - H&H 13.7/42.3  - Platelets 442  - Daily CBC     ENDOCRINE:  - Continue to monitor blood glucose, goal <180  - Hemoglobin A1C 6.3  - Glucose well controlled     OTHER:  - PT/OT/ST   - PM&R following; low level  - Case management following; referral sent to [address] [place] [address] (she lives in their assisted living)  - Code Status: FULL     PROPHYLAXIS:  Stress ulcer: Pepcid discontinued as steroids completed     DVT PROPHYLAXIS:  - SCD sleeves - Thigh High   - Lovenox 40mg QD     DISPOSITION:  [x] Stepdown status. We will continue to follow along. For any changes in exam or patient status please contact Neuro Critical Care.       Rolo Maza MD   Neurology PGY-3  Neuro Critical Care  7/17/2021     8:19 AM

## 2021-07-17 NOTE — PLAN OF CARE
Problem: Falls - Risk of:  Goal: Will remain free from falls  Description: Will remain free from falls  Outcome: Met This Shift  Goal: Absence of physical injury  Description: Absence of physical injury  Outcome: Met This Shift     Problem: HEMODYNAMIC STATUS  Goal: Patient has stable vital signs and fluid balance  Outcome: Met This Shift     Problem: ACTIVITY INTOLERANCE/IMPAIRED MOBILITY  Goal: Mobility/activity is maintained at optimum level for patient  Outcome: Met This Shift     Problem: COMMUNICATION IMPAIRMENT  Goal: Ability to express needs and understand communication  Outcome: Met This Shift     Problem: Coping:  Goal: Ability to cope will improve  Description: Ability to cope will improve  Outcome: Met This Shift  Goal: Ability to identify appropriate support needs will improve  Description: Ability to identify appropriate support needs will improve  Outcome: Met This Shift

## 2021-07-18 LAB
ABSOLUTE EOS #: <0.03 K/UL (ref 0–0.44)
ABSOLUTE IMMATURE GRANULOCYTE: <0.03 K/UL (ref 0–0.3)
ABSOLUTE LYMPH #: 2.1 K/UL (ref 1.1–3.7)
ABSOLUTE MONO #: 0.75 K/UL (ref 0.1–1.2)
ANION GAP SERPL CALCULATED.3IONS-SCNC: 14 MMOL/L (ref 9–17)
BASOPHILS # BLD: 0 % (ref 0–2)
BASOPHILS ABSOLUTE: <0.03 K/UL (ref 0–0.2)
BUN BLDV-MCNC: 25 MG/DL (ref 8–23)
BUN/CREAT BLD: ABNORMAL (ref 9–20)
CALCIUM SERPL-MCNC: 8.7 MG/DL (ref 8.6–10.4)
CHLORIDE BLD-SCNC: 104 MMOL/L (ref 98–107)
CO2: 21 MMOL/L (ref 20–31)
CREAT SERPL-MCNC: 0.61 MG/DL (ref 0.5–0.9)
DIFFERENTIAL TYPE: ABNORMAL
EOSINOPHILS RELATIVE PERCENT: 0 % (ref 1–4)
GFR AFRICAN AMERICAN: >60 ML/MIN
GFR NON-AFRICAN AMERICAN: >60 ML/MIN
GFR SERPL CREATININE-BSD FRML MDRD: ABNORMAL ML/MIN/{1.73_M2}
GFR SERPL CREATININE-BSD FRML MDRD: ABNORMAL ML/MIN/{1.73_M2}
GLUCOSE BLD-MCNC: 140 MG/DL (ref 70–99)
HCT VFR BLD CALC: 45.8 % (ref 36.3–47.1)
HEMOGLOBIN: 14.5 G/DL (ref 11.9–15.1)
IMMATURE GRANULOCYTES: 0 %
LYMPHOCYTES # BLD: 25 % (ref 24–43)
MCH RBC QN AUTO: 29.4 PG (ref 25.2–33.5)
MCHC RBC AUTO-ENTMCNC: 31.7 G/DL (ref 28.4–34.8)
MCV RBC AUTO: 92.9 FL (ref 82.6–102.9)
MONOCYTES # BLD: 9 % (ref 3–12)
NRBC AUTOMATED: 0 PER 100 WBC
PDW BLD-RTO: 13.6 % (ref 11.8–14.4)
PLATELET # BLD: 286 K/UL (ref 138–453)
PLATELET ESTIMATE: ABNORMAL
PMV BLD AUTO: 10.1 FL (ref 8.1–13.5)
POTASSIUM SERPL-SCNC: 3.7 MMOL/L (ref 3.7–5.3)
RBC # BLD: 4.93 M/UL (ref 3.95–5.11)
RBC # BLD: ABNORMAL 10*6/UL
SEG NEUTROPHILS: 66 % (ref 36–65)
SEGMENTED NEUTROPHILS ABSOLUTE COUNT: 5.65 K/UL (ref 1.5–8.1)
SODIUM BLD-SCNC: 139 MMOL/L (ref 135–144)
WBC # BLD: 8.6 K/UL (ref 3.5–11.3)
WBC # BLD: ABNORMAL 10*3/UL

## 2021-07-18 PROCEDURE — 85025 COMPLETE CBC W/AUTO DIFF WBC: CPT

## 2021-07-18 PROCEDURE — 6360000002 HC RX W HCPCS: Performed by: STUDENT IN AN ORGANIZED HEALTH CARE EDUCATION/TRAINING PROGRAM

## 2021-07-18 PROCEDURE — 6370000000 HC RX 637 (ALT 250 FOR IP): Performed by: STUDENT IN AN ORGANIZED HEALTH CARE EDUCATION/TRAINING PROGRAM

## 2021-07-18 PROCEDURE — 6370000000 HC RX 637 (ALT 250 FOR IP): Performed by: NURSE PRACTITIONER

## 2021-07-18 PROCEDURE — 99233 SBSQ HOSP IP/OBS HIGH 50: CPT | Performed by: PSYCHIATRY & NEUROLOGY

## 2021-07-18 PROCEDURE — 2060000000 HC ICU INTERMEDIATE R&B

## 2021-07-18 PROCEDURE — 97530 THERAPEUTIC ACTIVITIES: CPT

## 2021-07-18 PROCEDURE — 97112 NEUROMUSCULAR REEDUCATION: CPT

## 2021-07-18 PROCEDURE — 2500000003 HC RX 250 WO HCPCS: Performed by: STUDENT IN AN ORGANIZED HEALTH CARE EDUCATION/TRAINING PROGRAM

## 2021-07-18 PROCEDURE — 2580000003 HC RX 258: Performed by: PSYCHIATRY & NEUROLOGY

## 2021-07-18 PROCEDURE — 6370000000 HC RX 637 (ALT 250 FOR IP): Performed by: FAMILY MEDICINE

## 2021-07-18 PROCEDURE — 97535 SELF CARE MNGMENT TRAINING: CPT

## 2021-07-18 PROCEDURE — 6360000002 HC RX W HCPCS: Performed by: NURSE PRACTITIONER

## 2021-07-18 PROCEDURE — 80048 BASIC METABOLIC PNL TOTAL CA: CPT

## 2021-07-18 PROCEDURE — 36415 COLL VENOUS BLD VENIPUNCTURE: CPT

## 2021-07-18 RX ORDER — GABAPENTIN 300 MG/1
300 CAPSULE ORAL NIGHTLY
Status: DISCONTINUED | OUTPATIENT
Start: 2021-07-18 | End: 2021-07-21

## 2021-07-18 RX ORDER — ACETAMINOPHEN, ASPIRIN AND CAFFEINE 250; 250; 65 MG/1; MG/1; MG/1
1 TABLET, FILM COATED ORAL ONCE
Status: COMPLETED | OUTPATIENT
Start: 2021-07-18 | End: 2021-07-18

## 2021-07-18 RX ORDER — QUETIAPINE FUMARATE 25 MG/1
25 TABLET, FILM COATED ORAL NIGHTLY
Status: DISCONTINUED | OUTPATIENT
Start: 2021-07-18 | End: 2021-07-26 | Stop reason: HOSPADM

## 2021-07-18 RX ADMIN — SODIUM CHLORIDE, PRESERVATIVE FREE 10 ML: 5 INJECTION INTRAVENOUS at 08:20

## 2021-07-18 RX ADMIN — CEPHALEXIN 500 MG: 500 CAPSULE ORAL at 21:31

## 2021-07-18 RX ADMIN — HYDROMORPHONE HYDROCHLORIDE 0.5 MG: 1 INJECTION, SOLUTION INTRAMUSCULAR; INTRAVENOUS; SUBCUTANEOUS at 09:57

## 2021-07-18 RX ADMIN — QUETIAPINE FUMARATE 25 MG: 25 TABLET ORAL at 22:10

## 2021-07-18 RX ADMIN — HYDRALAZINE HYDROCHLORIDE 25 MG: 25 TABLET ORAL at 16:48

## 2021-07-18 RX ADMIN — CEPHALEXIN 500 MG: 500 CAPSULE ORAL at 08:21

## 2021-07-18 RX ADMIN — HYDRALAZINE HYDROCHLORIDE 25 MG: 25 TABLET ORAL at 08:21

## 2021-07-18 RX ADMIN — CHOLESTYRAMINE 4 G: 4 POWDER, FOR SUSPENSION ORAL at 08:21

## 2021-07-18 RX ADMIN — Medication 10 MG: at 04:12

## 2021-07-18 RX ADMIN — MAGNESIUM GLUCONATE 500 MG ORAL TABLET 400 MG: 500 TABLET ORAL at 08:20

## 2021-07-18 RX ADMIN — HYDROCHLOROTHIAZIDE 25 MG: 25 TABLET ORAL at 16:49

## 2021-07-18 RX ADMIN — AMLODIPINE BESYLATE 10 MG: 10 TABLET ORAL at 08:21

## 2021-07-18 RX ADMIN — HYDRALAZINE HYDROCHLORIDE 50 MG: 50 TABLET, FILM COATED ORAL at 21:31

## 2021-07-18 RX ADMIN — MYCOPHENOLATE MOFETIL 300 MG: 500 TABLET ORAL at 22:11

## 2021-07-18 RX ADMIN — ACETAMINOPHEN, ASPIRIN (NSAID) AND CAFFEINE 1 TABLET: 250; 250; 65 TABLET, FILM COATED ORAL at 16:48

## 2021-07-18 RX ADMIN — ENOXAPARIN SODIUM 40 MG: 40 INJECTION SUBCUTANEOUS at 08:21

## 2021-07-18 RX ADMIN — LOSARTAN POTASSIUM 100 MG: 50 TABLET, FILM COATED ORAL at 08:21

## 2021-07-18 RX ADMIN — CHOLESTYRAMINE 4 G: 4 POWDER, FOR SUSPENSION ORAL at 21:31

## 2021-07-18 ASSESSMENT — PAIN SCALES - GENERAL
PAINLEVEL_OUTOF10: 4
PAINLEVEL_OUTOF10: 0
PAINLEVEL_OUTOF10: 10

## 2021-07-18 ASSESSMENT — PAIN DESCRIPTION - PAIN TYPE
TYPE: ACUTE PAIN
TYPE: ACUTE PAIN

## 2021-07-18 ASSESSMENT — PAIN DESCRIPTION - LOCATION
LOCATION: HEAD;NECK
LOCATION: HEAD

## 2021-07-18 ASSESSMENT — PAIN DESCRIPTION - DIRECTION: RADIATING_TOWARDS: HEADACHE

## 2021-07-18 ASSESSMENT — PAIN DESCRIPTION - ORIENTATION: ORIENTATION: POSTERIOR

## 2021-07-18 NOTE — PLAN OF CARE
Problem: HEMODYNAMIC STATUS  Goal: Patient has stable vital signs and fluid balance  7/18/2021 0500 by Marly Tello RN  Outcome: Ongoing     Problem: ACTIVITY INTOLERANCE/IMPAIRED MOBILITY  Goal: Mobility/activity is maintained at optimum level for patient  7/18/2021 0500 by Marly Tello RN  Outcome: Ongoing  Neuro assessment completed, fall precautions in place, aspirations precautions in place, assess for barriers in communication and mobility, interventions to assist in mobility in place, encouraged to call for assistance, adaptive devices used as needed, assess emotional state and support offered, and support systems included in patient care. Will continue to monitor.

## 2021-07-18 NOTE — PROGRESS NOTES
Physical Therapy  Facility/Department: [facility] NEURO  Daily Treatment Note  NAME: Anthony Gupta  : 1940  MRN: [id number]    Date of Service: 2021    Discharge Recommendations:  Patient would benefit from continued therapy after discharge   PT Equipment Recommendations  Equipment Needed: No    Assessment   Body structures, Functions, Activity limitations: Decreased functional mobility ; Decreased strength;Decreased safe awareness;Decreased balance;Decreased sensation;Decreased endurance;Decreased ROM  Assessment: Pt required mxA completing sup<>sit, modA maintaining trunk control and facilitating cervical rotation, retraction and tilt to L reducing effects of L side neglect. Pt would benefit from further therapy post d/c facilitating improvements in functional mobility. Prognosis: Fair  Decision Making: High Complexity  REQUIRES PT FOLLOW UP: Yes  Activity Tolerance  Activity Tolerance: Patient Tolerated treatment well;Patient limited by endurance; Patient limited by fatigue     Patient Diagnosis(es): The encounter diagnosis was Intracranial hemorrhage ([facility] [address] 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 has a past surgical history that includes Appendectomy; Cholecystectomy; hernia repair; Tonsillectomy; Tubal ligation; and Hysterectomy.     Restrictions  Restrictions/Precautions  Restrictions/Precautions: General Precautions, Up as Tolerated, Seizure  Required Braces or Orthoses?: No  Position Activity Restriction  Other position/activity restrictions: SBP <160  Subjective General  Response To Previous Treatment: Patient with no complaints from previous session. Family / Caregiver Present: No  Subjective  Subjective: Pt agreeable to PT. Pain Screening  Patient Currently in Pain: Yes  Pain Assessment  Pain Level: 10 (Pt reports nerve block increased chronic neck pain.  )  Pain Type: Acute pain  Pain Location: Head;Neck  Pain Orientation: Posterior  Vital Signs  Patient Currently in Pain: Yes  Pre Treatment Pain Screening  Intervention List: Patient able to continue with treatment    Orientation  Overall Orientation Status: Within Functional Limits     Ambulation  Ambulation?: No  Stairs/Curb  Stairs?: No     Balance  Posture: Poor  Sitting - Static: Fair;-  Sitting - Dynamic: Fair;-  Exercises  Comments: trunk control ex's at EOB, pt leans to her L, LUE placed on bed encouraging wt bearing, cervical rotation and tilt to her left encouraged to reduce L side neglect.       Goals  Short term goals  Time Frame for Short term goals: 12 visits  Short term goal 1: Perform bed mob Ariella+1  Short term goal 2: Perform transfers modA+1  Short term goal 3: Ambulate 22' modA+2 w/ appropriate device  Short term goal 4: Dangle EOB ~15 min SBA  Short term goal 5: Facilitate active movement LUE/LLE  Patient Goals   Patient goals : Go home    Plan  Times per week: 5-6 visits weekly  Times per day: Daily  Current Treatment Recommendations: Strengthening, ROM, Balance Training, Functional Mobility Training, Gait Training, Endurance Training, Transfer Training, Safety Education & Training  Safety Devices  Type of devices: Nurse notified, Patient at risk for falls, Gait belt, Call light within reach, Bed alarm in place, Left in bed  Restraints  Initially in place: No     Therapy Time   Individual Concurrent Group Co-treatment   Time In  1450         Time Out  1515         Minutes  25                 IDA GARCIA, PTA

## 2021-07-18 NOTE — PROGRESS NOTES
Daily Progress Note  Neuro Critical Care    Patient Name: Dallin Salinas  Patient : 1940  Room/Bed: 4370/7592-69  Code Status: Full  Allergies: Allergies   Allergen Reactions    Clonidine Derivatives     Morphine     Sulfa Antibiotics        CHIEF COMPLAINT:      ICH     INTERVAL HISTORY    Initial Presentation (Admitted 21): The patient is a 78 y. o. female with a history of HTN, HLD and CVA () who presented as a transfer from [facility] with a right basal ganglia IPH. Patient initially presented to [facility] with acute onset left sided weakness. LKW around 1700, about 30 minutes prior to arrival at [facility]. Initial . Patient had stopped taking her antihypertensive medications 3 days prior. NIH 12.  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 Started on Cardene infusion for BP control. Transferred to [facility] for further evaluation and management. On arrival to [facility] ED, NIH 11. On Cardene infusion for BP control. CT Head stable. CTA Head/Neck unremarkable. Neurosurgery consulted. Admitted to Neuro ICU for close monitoring. Hospital Course:   : CT head stable. Home oral antihypertensives restarted. Febrile, UA sent. : Cardene infusion weaned off around 0300. Norvasc increased to 10 mg QD, remains off Cardene infusion. Discontinue Keppra, monitor off AEDs. Patient complaining of headache this morning, received Magnesium, caffeine and fluid bolus with some improvement. Headache continued this afternoon, will start Decadron 4mg q8h.   7/15: Mildly hypertensive intermittently, continue to monitor. Clinical exam stable. Discharge planning. : Hypertensive overnight with SBP into the 180's, received a dose of Labetalol and Hydralazine with some improvement. Patient and nursing reports she was very anxious last night which is thought to have contributed to elevated blood pressure.   She was given 10mg IV Geodon with improvement in anxiety and hypertension and she was able to sleep. BP trend so far this morning appears improved. Continue to follow and consider adding additional antihypertensive agent based on trend. Clinical exam stable. Patient complaining of headache this afternoon. Reports right posterior neck pain and right head pain. Flexeril 5mg PO given once for neck pain/muscle spasm. Will given another dose of Caffeine and Magnesium to help with headache. Decadron to continue through today. Case management working on discharge planning. 7/16: Report of possible sundowning, received seroquel. This morning, reports a headache on R frontal with associated R sided neck pain. Reports relief from flexeril dose yesterday. Last 24h:  Patient became increasingly uncomfortable with her headache. Requested and required more heavy dose medications to control pain. Patient received one-time dose of 0.5 Dilaudid and Benadryl with Compazine and was able to sleep through the evening. Patient still uncomfortable on repeat visits today 7/18/2021. Patient given home dose Excedrin which she requested.     CURRENT MEDICATIONS:  SCHEDULED MEDICATIONS:   QUEtiapine  25 mg Oral Nightly    aspirin-acetaminophen-caffeine  1 tablet Oral Once    gabapentin  300 mg Oral Nightly    hydrALAZINE  25 mg Oral BID- 8&2    hydrALAZINE  50 mg Oral Nightly    magnesium oxide  400 mg Oral Daily    cephALEXin  500 mg Oral 2 times per day    cholestyramine light  4 g Oral BID    amLODIPine  10 mg Oral Daily    enoxaparin  40 mg Subcutaneous Daily    losartan  100 mg Oral Daily    And    hydroCHLOROthiazide  25 mg Oral Daily    sodium chloride flush  5-40 mL Intravenous 2 times per day     CONTINUOUS INFUSIONS:   sodium chloride       PRN MEDICATIONS:   diphenoxylate-atropine, labetalol, sodium chloride flush, sodium chloride, acetaminophen, ondansetron **OR** ondansetron    VITALS:  Temperature Range: Temp: 98.1 °F (36.7 °C) Temp  Av °F (36.7 °C)  Min: 97.5 °F (36.4 °C)  Max: 98.4 °F (36.9 °C)  BP Range: Systolic (14ILY), CLZ:247 , Min:111 , IZC:339     Diastolic (15IMS), IXC:60, Min:68, Max:110    Pulse Range: Pulse  Av.5  Min: 78  Max: 96  Respiration Range: Resp  Av.8  Min: 16  Max: 20  Current Pulse Ox: SpO2: 94 %  24HR Pulse Ox Range: SpO2  Av.7 %  Min: 91 %  Max: 96 %  Patient Vitals for the past 12 hrs:   BP Temp Temp src Pulse Resp   21 1140 (!) 155/80 98.1 °F (36.7 °C) Oral     21 0800 (!) 151/89 98.4 °F (36.9 °C)      21 0400 (!) 177/110 98.2 °F (36.8 °C) Oral 78 16     Estimated body mass index is 30.72 kg/m² as calculated from the following:    Height as of this encounter: 4' 9\" (1.448 m). Weight as of this encounter: 141 lb 15.6 oz (64.4 kg).  []<16 Severe malnutrition  []1616.99 Moderate malnutrition  []1718.49 Mild malnutrition  []18.524.9 Normal  []2529.9 Overweight (not obese)  [x]3034.9 Obese class 1 (Low Risk)  []3539.9 Obese class 2 (Moderate Risk)  []?40 Obese class 3 (High Risk)    RECENT LABS:   Lab Results   Component Value Date    WBC 8.6 2021    HGB 14.5 2021    HCT 45.8 2021     2021    CHOL 144 2021    TRIG 194 (H) 2021    HDL 49 2021    ALT 35 (H) 2021    AST 27 2021     2021    K 3.7 2021     2021    CREATININE 0.61 2021    BUN 25 (H) 2021    CO2 21 2021    TSH 1.350 2021    INR 1.0 2021    LABA1C 6.3 (H) 2021     24 HOUR INTAKE/OUTPUT:    Intake/Output Summary (Last 24 hours) at 2021 1537  Last data filed at 2021 0600  Gross per 24 hour   Intake 180 ml   Output 1150 ml   Net -970 ml       IMAGING:   XR CHEST PORTABLE   Final Result   Essentially stable chest when compared to previous. Again the trachea is   somewhat ectatic and slightly displaced to the left which could be due to   positioning.   As mentioned previously a CT scan could be performed if   clinically indicated. XR CHEST PORTABLE   Final Result   1. Prominence of the right paratracheal stripe which could be positional.   If clinically indicated, this could be further evaluated with a   contrast-enhanced CT chest.      2.  Mild interstitial prominence in the lungs, possibly chronic interstitial   change versus edema versus pneumonitis. CT head without contrast   Final Result   Stable examination. CTA HEAD W CONTRAST   Final Result   1. Stable appearance to right basal ganglia hemorrhage with intraventricular   hemorrhage most likely representing rupture of the basal ganglia hemorrhage   into the ventricular system with asymmetric amount of blood in the right   lateral ventricle compared to the left. Blood fills the 3rd ventricle and   the sylvian aqueduct is also blood in the 4th ventricle. 2. Unremarkable CTA of the head. Findings were discussed with Dr. Mary Grande at 7:58 pm on 7/12/2021. CT HEAD WO CONTRAST   Final Result   1. Stable appearance to right basal ganglia hemorrhage with intraventricular   hemorrhage most likely representing rupture of the basal ganglia hemorrhage   into the ventricular system with asymmetric amount of blood in the right   lateral ventricle compared to the left. Blood fills the 3rd ventricle and   the sylvian aqueduct is also blood in the 4th ventricle. 2. Unremarkable CTA of the head. Findings were discussed with Dr. Mary Grande at 7:58 pm on 7/12/2021. Labs and Images reviewed with:  [x] Dr. Azeb Newton. Evelina    [] Dr. Gerry Camargo  [] Dr. Abhay Damon  [] There are no new interval images to review. PHYSICAL EXAM       CONSTITUTIONAL:  Awakens to voice. Oriented x3 In no acute distress. GCS 15. Nontoxic. Mild dysarthria. No aphasia.    HEAD:  normocephalic, atraumatic    EYES:  PERRL, EOMI   ENT:  moist mucous membranes   NECK:  supple, symmetric   LUNGS:  Equal air entry bilaterally, clear   CARDIOVASCULAR:  normal s1 / s2, RRR, distal pulses intact   ABDOMEN:  Soft, no rigidity   NEUROLOGIC:  Mental Status:  Awakens to voice. Oriented x3. Following commands. Cranial Nerves:    II: Visual fields:  normal  III: Pupils:  equal, round, reactive to light  III,IV,VI: Extra Ocular Movements: intact  V: Facial sensation:  intact  VII: Facial strength: abnormal - left facial droop     Motor Exam:    Drift:  present - left upper and lower extremities  Tone:  Decreased left upper extremity     5/5 strength in the right upper and right lower extremities  0/5 strength in the left upper extremity, no movement to pain. Withdraws to pain in the left lower extremity        Sensory:    Touch:    Right Upper Extremity:  normal  Left Upper Extremity:  abnormal - decreased  Right Lower Extremity:  normal  Left Lower Extremity:  abnormal - decreased     DRAINS:  [x] There are no drains for Neuro Critical Care to monitor at this time. ASSESSMENT AND PLAN:       The patient is a 79 yo female with a history of  HTN, HLD and CVA (2004) who presented as a transfer from [facility] with a right basal ganglia IPH. initially presented to [facility] ED with acute onset left sided weakness. Found to be hypertensive with . CT Head revealed right basal ganglia IPH with intraventricular extension.       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 Karlee Culp  - Maria De Jesus Kent discontinued, monitor off AED  - Headache management; Decadron 4mg Q8h (total 3 days, completed 7/16), Will repeat Flexeril 5mg again, OOB mobility.  Reordered Mag oxide home dose of 250mg once daily however only 400mg available in pharmacy, substituted  - Goal SBP<160  - Neuro checks per protocol     CARDIOVASCULAR:  - Goal SBP<160  - PRN Labetalol  - Essential HTN  - Continue Losartan-HCTZ 100-25mg daily, Norvasc 10mg QD  - Added Hydralazine 25 mg in AM, 25mg in afternoon, 50mg at night  - Troponin 11, EKG sinus rhythm, left anterior fascicular block  - Echo EF 69-22%, grade I diastolic dysfunction  - Lipid panel; LDL 56, cholesterol 144 - no need for statin in setting of ICH  - Continue telemetry  - Make Excedrin as needed if needed.     PULMONARY:  - Maintaining O2 sats on 0-2L nasal canula  - Wean O2 as tolerates  - Incentive spirometry     RENAL/FLUID/ELECTROLYTE:  - Normal renal functioning  - BUN 25/ Creatinine 0.69  - Monitor I&Os, adequate urine output  - No maintenance IVF, tolerating PO  - Replace electrolytes PRN  - Daily BMP     GI/NUTRITION:  NUTRITION:  ADULT DIET; Regular   - Passed speech bedside swallow eval; Easy to chew, thin liquids  - Tolerating diet  - Chronic diarrhea  - Resume home Cholestyramine 4g BID, Lomotil 2.5-0.025mg QID prn  - GI prophylaxis: Pepcid while on steroids only     ID:  - Afebrile, 36.9C  - Mild leukocytosis, WBC downtrending 11.4 from 13.6  - Procal 0.04; not suggestive of worsening infection  - UA consistent with UTI  - Continue Keflex 500mg BID, day 4/5 days for UTI  - Daily CBC     HEME:   - H&H 13.7/42.3  - Platelets 868  - Daily CBC     ENDOCRINE:  - Continue to monitor blood glucose, goal <180  - Hemoglobin A1C 6.3  - Glucose well controlled     OTHER:  - PT/OT/ST   - PM&R following; low level  - Case management following; referral sent to [facility] (she lives in their assisted living)  - Code Status: FULL     PROPHYLAXIS:  Stress ulcer: Pepcid discontinued as steroids completed     DVT PROPHYLAXIS:  - SCD sleeves - Thigh High   - Lovenox 40mg QD     DISPOSITION:  [x] Stepdown status. We will continue to follow along. For any changes in exam or patient status please contact Neuro Critical Care.       Peter Aguilar MD   7/18/2021     3:37 PM

## 2021-07-18 NOTE — PROGRESS NOTES
Occupational Therapy  Facility/Department: [facility] NEURO  Daily Treatment Note  NAME: Anthony Gupta  : 1940  MRN: [id number]    Date of Service: 2021    Discharge Recommendations:  Patient would benefit from continued therapy after discharge in order to increase pt balance, strength and independence. Pt unsafe to return to prior living arrangements. Assessment   Performance deficits / Impairments: Decreased functional mobility ; Decreased endurance;Decreased coordination;Decreased ADL status; Decreased sensation;Decreased posture;Decreased ROM; Decreased balance;Decreased strength;Decreased vision/visual deficit; Decreased safe awareness;Decreased high-level IADLs;Decreased fine motor control;Decreased cognition  Prognosis: [address]  OT Education: OT Role;Transfer Training;Precautions  Patient Education: purpose of OT; importance of activity; balance maintaince; proper posture; proper hand and foot placement  Barriers to Learning: pt demo P carry over  REQUIRES OT FOLLOW UP: Yes  Activity Tolerance  Activity Tolerance: Patient limited by fatigue;Patient limited by pain  Safety Devices  Safety Devices in place: Yes  Type of devices: Gait belt;Patient at risk for falls; Left in bed;Call light within reach; Bed alarm in place;Nurse notified  Restraints  Initially in place: No         Patient Diagnosis(es): The encounter diagnosis was Intracranial hemorrhage ([facility] [address] 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 has a past surgical history that includes Appendectomy;  Cholecystectomy; hernia repair; Tonsillectomy; Tubal ligation; and Hysterectomy. Restrictions  Restrictions/Precautions  Restrictions/Precautions: General Precautions, Up as Tolerated, Seizure  Required Braces or Orthoses?: No  Position Activity Restriction  Other position/activity restrictions: SBP <160  Subjective   General  Chart Reviewed: Yes  Patient assessed for rehabilitation services?: Yes  Family / Caregiver Present: No  General Comment  Comments: pt and RN agreeable to therapy  Pain Assessment  Pain Assessment: 0-10  Pain Level: 10  Pain Type: Acute pain  Pain Location: Head  Pain Radiating Towards: headache  Non-Pharmaceutical Pain Intervention(s): Distraction;Repositioned; Therapeutic presence;Elevation  Pre Treatment Pain Screening  Comments / Details: Pt supine in bed at start of session c/o headache. At session end pt supine in bed with BLE elevated, LUE elevated, call light in reach, bed alarm on and L side lying. RN aware. Vital Signs  Patient Currently in Pain: Yes   Orientation  Orientation  Overall Orientation Status: Impaired  Orientation Level: Oriented to person;Disoriented to place; Disoriented to time;Disoriented to situation  Objective    ADL  Grooming: Stand by assistance;Setup;Verbal cueing (face washing completed supine in bed)  LE Dressing: Dependent/Total (to doff/don socks supine in bed)  Additional Comments: Pt extremely lethargic throughout session req Max verbal/tactile cues to maintain level of alertness. Dependent to doff/don socks pt resisting BLE flexion/extension. SBA for face washing utilizing RUE pt would req Max A with LUE however unable to attempt sec to pt resisting activity with LUE. Throughout session pt limited per increased fatigue, decreased balance and pain. Balance  Sitting Balance: Maximum assistance (Pt tolerated approx 5 min static sitting with strong anterior and R lateral lean noted throughhout session, demo short intermitent periods of Mod A utilizing RUE for support. JONES attempted LUE WB however unsuccessful sec to pt resisting ROM to LUE.)  Standing Balance: Maximum assistance  Standing Balance  Time: Pt tolerated approx 10 sec  Activity: static standing in SS  Comment: pt unable to clear buttocks off of bed with strong anterior lean noted. Second stand not attempted for pt safety  Bed mobility  Supine to Sit: Maximum assistance (req assist with trunk and LLE)  Sit to Supine: Moderate assistance (guidance with trunk req assist with BLE)  Scooting: Maximal assistance  Comment: HOB elevated utilizing bed rails  Transfers  Sit to stand: Maximum assistance  Stand to sit: Maximum assistance  Transfer Comments: utilizing SS     Cognition  Overall Cognitive Status: Exceptions  Arousal/Alertness: Delayed responses to stimuli;Inconsistent responses to stimuli  Following Commands: Follows one step commands with repetition; Follows one step commands with increased time  Attention Span: Difficulty attending to directions; Difficulty dividing attention  Memory: Decreased recall of recent events  Safety Judgement: Decreased awareness of need for assistance;Decreased awareness of need for safety  Problem Solving: Decreased awareness of errors;Assistance required to identify errors made;Assistance required to correct errors made;Assistance required to implement solutions;Assistance required to generate solutions  Insights: Decreased awareness of deficits  Initiation: Requires cues for all  Sequencing: Requires cues for all     Type of ROM/Therapeutic Exercise  Type of ROM/Therapeutic Exercise: AAROM  Comment: PROM attempted in order to prevent contractures and increase pt independence with ADLs. Pt assisiting minimally with ROM req AAROM. After 5 reps with prolonged stretch pt began resisting ROM.   Exercises  Shoulder Flexion: x5  Shoulder Extension: x5  Elbow Flexion: x5  Elbow Extension: x5        Plan   Plan  Times per week: 4-5x/wk  Current Treatment Recommendations: Strengthening, Patient/Caregiver Education & Training, Home Management Training, ROM, Equipment Evaluation, Education, & procurement, Balance Training, Neuromuscular Re-education, Functional Mobility Training, Positioning, Endurance Training, Cognitive/Perceptual Training, Safety Education & Training, Self-Care / ADL    Goals  Short term goals  Time Frame for Short term goals: Patient will, by discharge:  Short term goal 1: demo UB ADLs at min A with <3 VC  Short term goal 2: demo LB ADLs at mod A with <3 VC  Short term goal 3: locate supplies on L side with <2 VC to visually scan to increase participation in ADL tasks  Short term goal 4: demo safety awareness with <3 VC to increase participation in functional tasks  Short term goal 5: incorporate LUE during ADL tasks with mod A to increase functional ROM and strength  Short term goal 6: demo functional transfers/mobility at mod A to increase participation in functional tasks  Short term goal 7: demo 10+ minutes of proprioceptive input to the L extremities to facilitate neuromuscular re-education to improve functional use       Therapy Time   Individual Concurrent Group Co-treatment   Time In 0919         Time Out 0959         Minutes 40         Timed Code Treatment Minutes: [address] 190, JONES/L

## 2021-07-19 ENCOUNTER — APPOINTMENT (OUTPATIENT)
Dept: GENERAL RADIOLOGY | Age: 81
DRG: 065 | End: 2021-07-19
Payer: MEDICARE

## 2021-07-19 LAB
ABSOLUTE EOS #: <0.03 K/UL (ref 0–0.44)
ABSOLUTE IMMATURE GRANULOCYTE: 0.05 K/UL (ref 0–0.3)
ABSOLUTE LYMPH #: 1.52 K/UL (ref 1.1–3.7)
ABSOLUTE MONO #: 1.09 K/UL (ref 0.1–1.2)
ANION GAP SERPL CALCULATED.3IONS-SCNC: 16 MMOL/L (ref 9–17)
BASOPHILS # BLD: 0 % (ref 0–2)
BASOPHILS ABSOLUTE: <0.03 K/UL (ref 0–0.2)
BUN BLDV-MCNC: 18 MG/DL (ref 8–23)
BUN/CREAT BLD: ABNORMAL (ref 9–20)
CALCIUM SERPL-MCNC: 8.4 MG/DL (ref 8.6–10.4)
CHLORIDE BLD-SCNC: 98 MMOL/L (ref 98–107)
CO2: 22 MMOL/L (ref 20–31)
CREAT SERPL-MCNC: 0.56 MG/DL (ref 0.5–0.9)
DIFFERENTIAL TYPE: ABNORMAL
EOSINOPHILS RELATIVE PERCENT: 0 % (ref 1–4)
GFR AFRICAN AMERICAN: >60 ML/MIN
GFR NON-AFRICAN AMERICAN: >60 ML/MIN
GFR SERPL CREATININE-BSD FRML MDRD: ABNORMAL ML/MIN/{1.73_M2}
GFR SERPL CREATININE-BSD FRML MDRD: ABNORMAL ML/MIN/{1.73_M2}
GLUCOSE BLD-MCNC: 163 MG/DL (ref 70–99)
HCT VFR BLD CALC: 45.9 % (ref 36.3–47.1)
HEMOGLOBIN: 15.1 G/DL (ref 11.9–15.1)
IMMATURE GRANULOCYTES: 0 %
LYMPHOCYTES # BLD: 13 % (ref 24–43)
MCH RBC QN AUTO: 29.7 PG (ref 25.2–33.5)
MCHC RBC AUTO-ENTMCNC: 32.9 G/DL (ref 28.4–34.8)
MCV RBC AUTO: 90.4 FL (ref 82.6–102.9)
MONOCYTES # BLD: 9 % (ref 3–12)
NRBC AUTOMATED: 0 PER 100 WBC
PDW BLD-RTO: 13.4 % (ref 11.8–14.4)
PLATELET # BLD: 297 K/UL (ref 138–453)
PLATELET ESTIMATE: ABNORMAL
PMV BLD AUTO: 10.1 FL (ref 8.1–13.5)
POTASSIUM SERPL-SCNC: 3.6 MMOL/L (ref 3.7–5.3)
RBC # BLD: 5.08 M/UL (ref 3.95–5.11)
RBC # BLD: ABNORMAL 10*6/UL
SEG NEUTROPHILS: 78 % (ref 36–65)
SEGMENTED NEUTROPHILS ABSOLUTE COUNT: 9.44 K/UL (ref 1.5–8.1)
SODIUM BLD-SCNC: 136 MMOL/L (ref 135–144)
WBC # BLD: 12.1 K/UL (ref 3.5–11.3)
WBC # BLD: ABNORMAL 10*3/UL

## 2021-07-19 PROCEDURE — 6370000000 HC RX 637 (ALT 250 FOR IP): Performed by: STUDENT IN AN ORGANIZED HEALTH CARE EDUCATION/TRAINING PROGRAM

## 2021-07-19 PROCEDURE — 80048 BASIC METABOLIC PNL TOTAL CA: CPT

## 2021-07-19 PROCEDURE — 94761 N-INVAS EAR/PLS OXIMETRY MLT: CPT

## 2021-07-19 PROCEDURE — 6360000002 HC RX W HCPCS: Performed by: NURSE PRACTITIONER

## 2021-07-19 PROCEDURE — 99233 SBSQ HOSP IP/OBS HIGH 50: CPT | Performed by: PSYCHIATRY & NEUROLOGY

## 2021-07-19 PROCEDURE — 6370000000 HC RX 637 (ALT 250 FOR IP): Performed by: PSYCHIATRY & NEUROLOGY

## 2021-07-19 PROCEDURE — 6370000000 HC RX 637 (ALT 250 FOR IP): Performed by: NURSE PRACTITIONER

## 2021-07-19 PROCEDURE — 2500000003 HC RX 250 WO HCPCS: Performed by: STUDENT IN AN ORGANIZED HEALTH CARE EDUCATION/TRAINING PROGRAM

## 2021-07-19 PROCEDURE — 74230 X-RAY XM SWLNG FUNCJ C+: CPT

## 2021-07-19 PROCEDURE — 6370000000 HC RX 637 (ALT 250 FOR IP): Performed by: FAMILY MEDICINE

## 2021-07-19 PROCEDURE — 92611 MOTION FLUOROSCOPY/SWALLOW: CPT

## 2021-07-19 PROCEDURE — 36415 COLL VENOUS BLD VENIPUNCTURE: CPT

## 2021-07-19 PROCEDURE — 2060000000 HC ICU INTERMEDIATE R&B

## 2021-07-19 PROCEDURE — 2580000003 HC RX 258: Performed by: PSYCHIATRY & NEUROLOGY

## 2021-07-19 PROCEDURE — 6360000002 HC RX W HCPCS: Performed by: STUDENT IN AN ORGANIZED HEALTH CARE EDUCATION/TRAINING PROGRAM

## 2021-07-19 PROCEDURE — 85025 COMPLETE CBC W/AUTO DIFF WBC: CPT

## 2021-07-19 RX ORDER — ACETAMINOPHEN, ASPIRIN AND CAFFEINE 250; 250; 65 MG/1; MG/1; MG/1
1 TABLET, FILM COATED ORAL ONCE
Status: COMPLETED | OUTPATIENT
Start: 2021-07-19 | End: 2021-07-19

## 2021-07-19 RX ORDER — DIPHENHYDRAMINE HYDROCHLORIDE 50 MG/ML
25 INJECTION INTRAMUSCULAR; INTRAVENOUS ONCE
Status: COMPLETED | OUTPATIENT
Start: 2021-07-19 | End: 2021-07-19

## 2021-07-19 RX ORDER — MORPHINE SULFATE 2 MG/ML
2 INJECTION, SOLUTION INTRAMUSCULAR; INTRAVENOUS ONCE
Status: COMPLETED | OUTPATIENT
Start: 2021-07-19 | End: 2021-07-19

## 2021-07-19 RX ADMIN — AMLODIPINE BESYLATE 10 MG: 10 TABLET ORAL at 09:15

## 2021-07-19 RX ADMIN — SODIUM CHLORIDE, PRESERVATIVE FREE 20 ML: 5 INJECTION INTRAVENOUS at 09:18

## 2021-07-19 RX ADMIN — HYDRALAZINE HYDROCHLORIDE 25 MG: 25 TABLET ORAL at 14:13

## 2021-07-19 RX ADMIN — MORPHINE SULFATE 2 MG: 2 INJECTION, SOLUTION INTRAMUSCULAR; INTRAVENOUS at 20:33

## 2021-07-19 RX ADMIN — ACETAMINOPHEN, ASPIRIN (NSAID) AND CAFFEINE 1 TABLET: 250; 250; 65 TABLET, FILM COATED ORAL at 16:36

## 2021-07-19 RX ADMIN — ENOXAPARIN SODIUM 40 MG: 40 INJECTION SUBCUTANEOUS at 09:15

## 2021-07-19 RX ADMIN — SODIUM CHLORIDE, PRESERVATIVE FREE 10 ML: 5 INJECTION INTRAVENOUS at 20:28

## 2021-07-19 RX ADMIN — MYCOPHENOLATE MOFETIL 300 MG: 500 TABLET ORAL at 19:57

## 2021-07-19 RX ADMIN — ACETAMINOPHEN 650 MG: 325 TABLET ORAL at 14:13

## 2021-07-19 RX ADMIN — DIPHENHYDRAMINE HYDROCHLORIDE 25 MG: 50 INJECTION INTRAMUSCULAR; INTRAVENOUS at 20:26

## 2021-07-19 RX ADMIN — QUETIAPINE FUMARATE 25 MG: 25 TABLET ORAL at 19:57

## 2021-07-19 RX ADMIN — CHOLESTYRAMINE 4 G: 4 POWDER, FOR SUSPENSION ORAL at 19:57

## 2021-07-19 RX ADMIN — CHOLESTYRAMINE 4 G: 4 POWDER, FOR SUSPENSION ORAL at 09:15

## 2021-07-19 RX ADMIN — HYDRALAZINE HYDROCHLORIDE 25 MG: 25 TABLET ORAL at 09:15

## 2021-07-19 RX ADMIN — HYDROCHLOROTHIAZIDE 25 MG: 25 TABLET ORAL at 14:13

## 2021-07-19 RX ADMIN — LOSARTAN POTASSIUM 100 MG: 50 TABLET, FILM COATED ORAL at 09:14

## 2021-07-19 RX ADMIN — CEPHALEXIN 500 MG: 500 CAPSULE ORAL at 09:15

## 2021-07-19 RX ADMIN — Medication 10 MG: at 06:29

## 2021-07-19 RX ADMIN — MAGNESIUM GLUCONATE 500 MG ORAL TABLET 400 MG: 500 TABLET ORAL at 09:14

## 2021-07-19 RX ADMIN — HYDRALAZINE HYDROCHLORIDE 50 MG: 50 TABLET, FILM COATED ORAL at 20:32

## 2021-07-19 ASSESSMENT — PAIN DESCRIPTION - PROGRESSION
CLINICAL_PROGRESSION: NOT CHANGED

## 2021-07-19 ASSESSMENT — PAIN SCALES - GENERAL
PAINLEVEL_OUTOF10: 5
PAINLEVEL_OUTOF10: 10
PAINLEVEL_OUTOF10: 10
PAINLEVEL_OUTOF10: 8

## 2021-07-19 ASSESSMENT — PAIN SCALES - WONG BAKER: WONGBAKER_NUMERICALRESPONSE: 0

## 2021-07-19 ASSESSMENT — PAIN DESCRIPTION - ONSET: ONSET: ON-GOING

## 2021-07-19 ASSESSMENT — PAIN DESCRIPTION - LOCATION: LOCATION: HEAD

## 2021-07-19 ASSESSMENT — PAIN DESCRIPTION - FREQUENCY: FREQUENCY: CONTINUOUS

## 2021-07-19 ASSESSMENT — PAIN DESCRIPTION - PAIN TYPE: TYPE: ACUTE PAIN

## 2021-07-19 ASSESSMENT — PAIN DESCRIPTION - ORIENTATION: ORIENTATION: POSTERIOR

## 2021-07-19 ASSESSMENT — PAIN DESCRIPTION - DESCRIPTORS: DESCRIPTORS: HEADACHE

## 2021-07-19 NOTE — SIGNIFICANT EVENT
Peer to Peer     Called for peer to peer, on the number given. Provided with reference number. Was put on hold, and nobody responded. X 2, was on hold and left the name and number to call back.       Electronically signed by Juan Lynch MD on 7/19/2021 at 4:28 PM

## 2021-07-19 NOTE — PLAN OF CARE
Problem: Falls - Risk of:  Goal: Will remain free from falls  Description: Will remain free from falls  7/19/2021 1550 by Cheyenne Nichols RN  Outcome: Ongoing  7/19/2021 0203 by Jo Mcallister RN  Outcome: Ongoing  Goal: Absence of physical injury  Description: Absence of physical injury  7/19/2021 1550 by Cheyenne Nichols RN  Outcome: Ongoing  7/19/2021 0203 by Jo Mcallister RN  Outcome: Ongoing

## 2021-07-19 NOTE — CARE COORDINATION
Received call from [address]. [email or web address] has notified us that they intend to deny SNF placement. They are recommending a Peer to Peer. [phone] Reference # [id number] Jany Nunez 1940 Room 546 NEED DONE BY 4 PM today Perfect served Heladio Saez from Neuro Critical Care.

## 2021-07-19 NOTE — PROGRESS NOTES
Daily Progress Note  Neuro Critical Care    Patient Name: Sumit Lutz  Patient : 1940  Room/Bed: 3740/2523-98  Code Status: Full  Allergies: Allergies   Allergen Reactions    Clonidine Derivatives     Morphine     Sulfa Antibiotics        CHIEF COMPLAINT:      ICH     INTERVAL HISTORY    Initial Presentation (Admitted 21): The patient is a 78 y. o. female with a history of HTN, HLD and CVA () who presented as a transfer from [facility] with a right basal ganglia IPH. Patient initially presented to [facility] with acute onset left sided weakness. LKW around 1700, about 30 minutes prior to arrival at [facility]. Initial . Patient had stopped taking her antihypertensive medications 3 days prior. NIH 12.  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 Started on Cardene infusion for BP control. Transferred to [facility] for further evaluation and management. On arrival to [facility] ED, NIH 11. On Cardene infusion for BP control. CT Head stable. CTA Head/Neck unremarkable. Neurosurgery consulted. Admitted to Neuro ICU for close monitoring. Hospital Course:   : CT head stable. Home oral antihypertensives restarted. Febrile, UA sent. : Cardene infusion weaned off around 0300. Norvasc increased to 10 mg QD, remains off Cardene infusion. Discontinue Keppra, monitor off AEDs. Patient complaining of headache this morning, received Magnesium, caffeine and fluid bolus with some improvement. Headache continued this afternoon, will start Decadron 4mg q8h.   7/15: Mildly hypertensive intermittently, continue to monitor. Clinical exam stable. Discharge planning. : Hypertensive overnight with SBP into the 180's, received a dose of Labetalol and Hydralazine with some improvement. Patient and nursing reports she was very anxious last night which is thought to have contributed to elevated blood pressure.   She was given 10mg IV Geodon with improvement in anxiety and hypertension and she was able to sleep. BP trend so far this morning appears improved. Continue to follow and consider adding additional antihypertensive agent based on trend. Clinical exam stable. Patient complaining of headache this afternoon. Reports right posterior neck pain and right head pain. Flexeril 5mg PO given once for neck pain/muscle spasm. Will given another dose of Caffeine and Magnesium to help with headache. Decadron to continue through today. Case management working on discharge planning. 7/16: Report of possible sundowning, received seroquel. This morning, reports a headache on R frontal with associated R sided neck pain. Reports relief from flexeril dose yesterday. 7/17: Patient became increasingly uncomfortable with her headache. Requested and required more heavy dose medications to control pain. Patient received one-time dose of 0.5 Dilaudid and Benadryl with Compazine and was able to sleep through the evening. Patient still uncomfortable on repeat visits today   7/18/2021. Patient given home dose Excedrin which she requested. Last 24 hrs:  No acute events over night. Patient given home dose of excedrin. Peer to Peer was tried x2  Was  Placed on hold.            CURRENT MEDICATIONS:  SCHEDULED MEDICATIONS:   aspirin-acetaminophen-caffeine  1 tablet Oral Once    QUEtiapine  25 mg Oral Nightly    gabapentin  300 mg Oral Nightly    hydrALAZINE  25 mg Oral BID- 8&2    hydrALAZINE  50 mg Oral Nightly    magnesium oxide  400 mg Oral Daily    cholestyramine light  4 g Oral BID    amLODIPine  10 mg Oral Daily    enoxaparin  40 mg Subcutaneous Daily    losartan  100 mg Oral Daily    And    hydroCHLOROthiazide  25 mg Oral Daily    sodium chloride flush  5-40 mL Intravenous 2 times per day     CONTINUOUS INFUSIONS:   sodium chloride       PRN MEDICATIONS:   diphenoxylate-atropine, labetalol, sodium chloride flush, sodium chloride, acetaminophen, ondansetron **OR** ondansetron    VITALS:  Temperature Range: Temp: 99.1 °F (37.3 °C) Temp  Av.7 °F (37.1 °C)  Min: 98.3 °F (36.8 °C)  Max: 99.2 °F (37.3 °C)  BP Range: Systolic (32UEF), CBS:158 , Min:147 , JQJ:067     Diastolic (72LQQ), PKB:28, Min:78, Max:93    Pulse Range: Pulse  Av  Min: 86  Max: 96  Respiration Range: Resp  Av  Min: 17  Max: 22  Current Pulse Ox: SpO2: 93 %  24HR Pulse Ox Range: SpO2  Av %  Min: 93 %  Max: 93 %  Patient Vitals for the past 12 hrs:   BP Temp Temp src   21 1200 (!) 154/84 99.1 °F (37.3 °C) Oral   21 0800  99.2 °F (37.3 °C) Axillary     Estimated body mass index is 30.72 kg/m² as calculated from the following:    Height as of this encounter: 4' 9\" (1.448 m). Weight as of this encounter: 141 lb 15.6 oz (64.4 kg).  []<16 Severe malnutrition  []1616.99 Moderate malnutrition  []1718.49 Mild malnutrition  []18.524.9 Normal  []2529.9 Overweight (not obese)  [x]3034.9 Obese class 1 (Low Risk)  []3539.9 Obese class 2 (Moderate Risk)  []?40 Obese class 3 (High Risk)    RECENT LABS:   Lab Results   Component Value Date    WBC 12.1 (H) 2021    HGB 15.1 2021    HCT 45.9 2021     2021    CHOL 144 2021    TRIG 194 (H) 2021    HDL 49 2021    ALT 35 (H) 2021    AST 27 2021     2021    K 3.6 (L) 2021    CL 98 2021    CREATININE 0.56 2021    BUN 18 2021    CO2 22 2021    TSH 1.350 2021    INR 1.0 2021    LABA1C 6.3 (H) 2021     24 HOUR INTAKE/OUTPUT:    Intake/Output Summary (Last 24 hours) at 2021 1612  Last data filed at 2021 1230  Gross per 24 hour   Intake 1450 ml   Output 1550 ml   Net -100 ml       IMAGING:   Fluoroscopy modified barium swallow with video   Final Result   Deep laryngeal penetration of thick liquid. No aspiration.       Please see separate speech pathology report for full discussion of findings   and recommendations. XR CHEST PORTABLE   Final Result   Essentially stable chest when compared to previous. Again the trachea is   somewhat ectatic and slightly displaced to the left which could be due to   positioning. As mentioned previously a CT scan could be performed if   clinically indicated. XR CHEST PORTABLE   Final Result   1. Prominence of the right paratracheal stripe which could be positional.   If clinically indicated, this could be further evaluated with a   contrast-enhanced CT chest.      2.  Mild interstitial prominence in the lungs, possibly chronic interstitial   change versus edema versus pneumonitis. CT head without contrast   Final Result   Stable examination. CTA HEAD W CONTRAST   Final Result   1. Stable appearance to right basal ganglia hemorrhage with intraventricular   hemorrhage most likely representing rupture of the basal ganglia hemorrhage   into the ventricular system with asymmetric amount of blood in the right   lateral ventricle compared to the left. Blood fills the 3rd ventricle and   the sylvian aqueduct is also blood in the 4th ventricle. 2. Unremarkable CTA of the head. Findings were discussed with Dr. Gina Kinney at 7:58 pm on 7/12/2021. CT HEAD WO CONTRAST   Final Result   1. Stable appearance to right basal ganglia hemorrhage with intraventricular   hemorrhage most likely representing rupture of the basal ganglia hemorrhage   into the ventricular system with asymmetric amount of blood in the right   lateral ventricle compared to the left. Blood fills the 3rd ventricle and   the sylvian aqueduct is also blood in the 4th ventricle. 2. Unremarkable CTA of the head. Findings were discussed with Dr. Gina Kinney at 7:58 pm on 7/12/2021. Labs and Images reviewed with:  [x] Dr. Víctor Hoyt    [] Dr. Live Reyna  [] Dr. Enrico Lewis  [] There are no new interval images to review.      PHYSICAL EXAM CONSTITUTIONAL:  Awakens to voice. Oriented x3 In no acute distress. GCS 15. Nontoxic. Mild dysarthria. No aphasia. HEAD:  normocephalic, atraumatic    EYES:  PERRL, EOMI   ENT:  moist mucous membranes   NECK:  supple, symmetric   LUNGS:  Equal air entry bilaterally, clear   CARDIOVASCULAR:  normal s1 / s2, RRR, distal pulses intact   ABDOMEN:  Soft, no rigidity   NEUROLOGIC:  Mental Status:  Awakens to voice. Oriented x3. Following commands. Cranial Nerves:    II: Visual fields:  normal  III: Pupils:  equal, round, reactive to light  III,IV,VI: Extra Ocular Movements: intact  V: Facial sensation:  intact  VII: Facial strength: abnormal - left facial droop     Motor Exam:    Drift:  present - left upper and lower extremities  Tone:  Decreased left upper extremity     5/5 strength in the right upper and right lower extremities  0/5 strength in the left upper extremity, no movement to pain. Withdraws to pain in the left lower extremity        Sensory:    Touch:    Right Upper Extremity:  normal  Left Upper Extremity:  abnormal - decreased  Right Lower Extremity:  normal  Left Lower Extremity:  abnormal - decreased     DRAINS:  [x] There are no drains for Neuro Critical Care to monitor at this time. ASSESSMENT AND PLAN:       The patient is a 77 yo female with a history of  HTN, HLD and CVA (2004) who presented as a transfer from [facility] with a right basal ganglia IPH. initially presented to [facility] ED with acute onset left sided weakness. Found to be hypertensive with .   CT Head revealed right basal ganglia IPH with intraventricular extension.       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 Moise Simon  - Lonny Bae discontinued, monitor off AED  - Headache management; Decadron 4mg Q8h (total 3 days, completed 7/16), Will repeat Flexeril 5mg again, OOB mobility. Reordered Mag oxide home dose of 250mg once daily however only 400mg available in pharmacy, substituted  - Goal SBP<160  - Neuro checks per protocol     CARDIOVASCULAR:  - Goal SBP<160  - PRN Labetalol  - Essential HTN  - Continue Losartan-HCTZ 100-25mg daily, Norvasc 10mg QD  - Added Hydralazine 25 mg in AM, 25mg in afternoon, 50mg at night  - Troponin 11, EKG sinus rhythm, left anterior fascicular block  - Echo EF 60-17%, grade I diastolic dysfunction  - Lipid panel; LDL 56, cholesterol 144 - no need for statin in setting of ICH  - Continue telemetry  - Make Excedrin as needed if needed.     PULMONARY:  - Maintaining O2 sats on 0-2L nasal canula  - Wean O2 as tolerates  - Incentive spirometry     RENAL/FLUID/ELECTROLYTE:  - Normal renal functioning  - BUN 25/ Creatinine 0.69  - Monitor I&Os, adequate urine output  - No maintenance IVF, tolerating PO  - Replace electrolytes PRN  - Daily BMP     GI/NUTRITION:  NUTRITION:  ADULT DIET;  Regular   - Passed speech bedside swallow eval; Easy to chew, thin liquids  - Tolerating diet  - Chronic diarrhea  - Resume home Cholestyramine 4g BID, Lomotil 2.5-0.025mg QID prn  - GI prophylaxis: Pepcid while on steroids only     ID:  - Afebrile, 36.9C  - Mild leukocytosis, WBC downtrending 11.4 from 13.6  - Procal 0.04; not suggestive of worsening infection  - UA consistent with UTI  - Continue Keflex 500mg BID, day 4/5 days for UTI  - Daily CBC     HEME:   - H&H 13.7/42.3  - Platelets 881  - Daily CBC     ENDOCRINE:  - Continue to monitor blood glucose, goal <180  - Hemoglobin A1C 6.3  - Glucose well controlled     OTHER:  - PT/OT/ST   - PM&R following; low level  - Case management following; referral sent to [address] [place], [place] [address]fin (she lives in their assisted living)  - Code Status: FULL     PROPHYLAXIS:  Stress ulcer: Pepcid discontinued as steroids completed     DVT PROPHYLAXIS:  - SCD sleeves - Thigh High   - Lovenox 40mg QD     DISPOSITION:  [x] Stepdown status. We will continue to follow along. For any changes in exam or patient status please contact Neuro Critical Care.       Jesusita Bradley MD   7/19/2021     4:12 PM

## 2021-07-19 NOTE — PROCEDURES
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 Past Surgical History:  has a past surgical history that includes Appendectomy; Cholecystectomy; hernia repair; Tonsillectomy; Tubal ligation; and Hysterectomy. Current Diet Solid Consistency: Dental Soft  Current Diet Liquid Consistency: Thin       Type of Study: Initial MBS         Patient Complaints/Reason for Referral:  Reji Espinoza was referred for a MBS to assess the efficiency of his/her swallow function, assess for aspiration, and to make recommendations regarding safe dietary consistencies, effective compensatory strategies, and safe eating environment. Onset of problem:      The patient is a 78 y.o. female presented to [facility] emergency department with complaint of a headache. At  patient was noted to have a change in mental status with new left-sided weakness. CT head was obtained at that time which showed IPH with blood entering the third and fourth ventricles. There was also concern at that time for being a right basal ganglia bleed. Patient was auto launched from [facility] facility to [facility] ER. In route patient was started on Cardene. Behavior/Cognition/Vision/Hearing:  Behavior/Cognition: Alert; Cooperative  Vision: Impaired  Hearing: Exceptions to [facility]    Impressions:  Patient presents with safe swallow for Dysphagia Minced and Moist (Dysphagia II)  diet with Mildly Thick (Nectar) liquids as evidenced by no overt s/s of aspiration noted with consistencies tested. Recommend small sips and bites, only feed when alert and awake and upright at 90 degrees for all PO intake. Recommend close monitoring for overt/clinical s/s of aspiration and D/C PO intake and complete Modified Barium Swallow Study should they occur. Results and recommendations reported to RN. Patient Position: Lateral and Patient Degrees: 90      Consistencies Administered: Dysphagia Soft and Bite-Sized (Dysphagia III); Dysphagia Pureed (Dysphagia I); Nectar  teaspoon;Nectar straw; Thin straw    Compensatory Swallowing Strategies Attempted: Eat/Feed slowly;Upright as possible for all oral intake;Small bites/sips  Postural Changes and/or Swallow Maneuvers Trialed: Upright 90 degrees    Recommended Diet:  Solid consistency: Dysphagia Minced and Moist (Dysphagia II)  Liquid consistency: Mildly Thick (Nectar)  Liquid administration via: Cup;Straw    Medication administration: Meds in puree    Safe Swallow Protocol:     Compensatory Swallowing Strategies: Alternate solids and liquids;Eat/Feed slowly;Upright as possible for all oral intake;Small bites/sips    Recommendations/Treatment  Requires SLP Intervention: Yes        D/C Recommendations: To be determined  Postural Changes and/or Swallow Maneuvers: Upright 90 degrees      Recommended Exercises:    Therapeutic Interventions: Diet tolerance monitoring;Laurie; Laryngeal exercises; Pharyngeal exercises; Tongue base strengthening         Education: Images and recommendations were reviewed with pt following this exam.   Patient Education: yes  Patient Education Response: Verbalizes understanding    Prognosis  Prognosis for safe diet advancement: fair  Duration/Frequency of Treatment  Duration/Frequency of Treatment: 2-3X/week      Goals:    Long Term:     To Maximize safety with intake, optimize nutrition/hydration and minimize risk for aspiration. Short Term:  Goals: The patient will tolerate recommended diet without observed clinical signs of aspiration      Oral Preparation / Oral Phase  Oral Phase: WFL  Oral Phase: decreased mastication with piecemeal bolus movement with soft solids, otherwise functional        Pharyngeal Phase  Pharyngeal Phase: Impaired    Pharyngeal: Puree: No penetration and no aspiration with min vallec stasis. Soft Solids: No penetration and no aspiration with no stasis. This tsp: + deep penetration during swallow with cough. Nectar straw: No penetration and no aspiration with no stasis. Thin straw: + deep penetration with stasis in the laryngeal vestibule    Dysphagia Outcome Severity Scale: Level 4: Mild moderate dysphagia- Intermittent supervision/cueing.  One - two diet consistencies restricted         Esophageal Phase  Esophageal Screen: WFL        Pain   Patient Currently in Pain: No  Pain Level: 4  Pain Type: Acute pain  Pain Location: Head, Neck      Therapy Time:   Individual Concurrent Group Co-treatment   Time In 1100         Time Out 1115         Minutes [address], SLP, 7/19/2021, 12:54 PM

## 2021-07-19 NOTE — PLAN OF CARE
Problem: Nutrition  Goal: Optimal nutrition therapy  Outcome: Ongoing  Note: Nutrition Problem #1: Predicted inadequate energy intake  Intervention: Food and/or Nutrient Delivery: Modify Current Diet, Start Oral Nutrition Supplement  Nutritional Goals: Meet >50% of estimated nutrient needs

## 2021-07-19 NOTE — PROGRESS NOTES
[address] [place] [employer]  Speech Language Pathology    Date: 7/19/2021  Patient Name: Davey Knight  YOB: 1940   AGE: 78 y.o. MRN: [id number]        Patient Not Available for Speech Therapy     Due to:  [] Testing  [] Hemodialysis  [] Cancelled by RN  [] Surgery   [] Intubation/Sedation/Pain Medication  [] Medical instability  [x] Other: Pt. Lethargic; unable to stay awake/alert to complete ST tasks. Next scheduled treatment: 7/20/2021    Completed by: [address]  Clinician    Cosigned By: Celeste Alcantara S.CCC/SLP

## 2021-07-19 NOTE — PLAN OF CARE
Problem: Falls - Risk of:  Goal: Will remain free from falls  Description: Will remain free from falls  7/19/2021 0203 by Jose Antonio Hamilton RN  Outcome: Ongoing  7/18/2021 1626 by Anuradha Morrow RN  Outcome: Ongoing  Goal: Absence of physical injury  Description: Absence of physical injury  7/19/2021 0203 by Jose Antonio Hamilton RN  Outcome: Ongoing  7/18/2021 1626 by Anuradha Morrow RN  Outcome: Ongoing     Problem: HEMODYNAMIC STATUS  Goal: Patient has stable vital signs and fluid balance  7/19/2021 0203 by Jose Antonio Hamilton RN  Outcome: Ongoing  7/18/2021 1626 by Anuradha Morrow RN  Outcome: Ongoing     Problem: ACTIVITY INTOLERANCE/IMPAIRED MOBILITY  Goal: Mobility/activity is maintained at optimum level for patient  7/19/2021 0203 by Jose Antonio Hamilton RN  Outcome: Ongoing  7/18/2021 1626 by Anuradha Morrow RN  Outcome: Ongoing     Problem: COMMUNICATION IMPAIRMENT  Goal: Ability to express needs and understand communication  7/19/2021 0203 by Jose Antonio Hamilton RN  Outcome: Ongoing  7/18/2021 1626 by Anuradha Morrow RN  Outcome: Ongoing     Problem: ACTIVITY INTOLERANCE/IMPAIRED MOBILITY  Goal: Mobility/activity is maintained at optimum level for patient  7/19/2021 0203 by Jose Antonio Hamilton RN  Outcome: Ongoing  7/18/2021 1626 by Anuradha Morrow RN  Outcome: Ongoing

## 2021-07-20 ENCOUNTER — APPOINTMENT (OUTPATIENT)
Dept: GENERAL RADIOLOGY | Age: 81
DRG: 065 | End: 2021-07-20
Payer: MEDICARE

## 2021-07-20 LAB
ABSOLUTE EOS #: 0.08 K/UL (ref 0–0.44)
ABSOLUTE IMMATURE GRANULOCYTE: 0.07 K/UL (ref 0–0.3)
ABSOLUTE LYMPH #: 2.25 K/UL (ref 1.1–3.7)
ABSOLUTE MONO #: 1.48 K/UL (ref 0.1–1.2)
ANION GAP SERPL CALCULATED.3IONS-SCNC: 12 MMOL/L (ref 9–17)
BASOPHILS # BLD: 0 % (ref 0–2)
BASOPHILS ABSOLUTE: 0.03 K/UL (ref 0–0.2)
BILIRUBIN URINE: NEGATIVE
BUN BLDV-MCNC: 22 MG/DL (ref 8–23)
BUN/CREAT BLD: ABNORMAL (ref 9–20)
CALCIUM SERPL-MCNC: 8.8 MG/DL (ref 8.6–10.4)
CHLORIDE BLD-SCNC: 98 MMOL/L (ref 98–107)
CO2: 23 MMOL/L (ref 20–31)
COLOR: YELLOW
COMMENT UA: NORMAL
CREAT SERPL-MCNC: 0.51 MG/DL (ref 0.5–0.9)
DIFFERENTIAL TYPE: ABNORMAL
EOSINOPHILS RELATIVE PERCENT: 1 % (ref 1–4)
GFR AFRICAN AMERICAN: >60 ML/MIN
GFR NON-AFRICAN AMERICAN: >60 ML/MIN
GFR SERPL CREATININE-BSD FRML MDRD: ABNORMAL ML/MIN/{1.73_M2}
GFR SERPL CREATININE-BSD FRML MDRD: ABNORMAL ML/MIN/{1.73_M2}
GLUCOSE BLD-MCNC: 146 MG/DL (ref 70–99)
GLUCOSE URINE: NEGATIVE
HCT VFR BLD CALC: 45.5 % (ref 36.3–47.1)
HEMOGLOBIN: 15 G/DL (ref 11.9–15.1)
IMMATURE GRANULOCYTES: 1 %
KETONES, URINE: NEGATIVE
LEUKOCYTE ESTERASE, URINE: NEGATIVE
LYMPHOCYTES # BLD: 16 % (ref 24–43)
MCH RBC QN AUTO: 29.7 PG (ref 25.2–33.5)
MCHC RBC AUTO-ENTMCNC: 33 G/DL (ref 28.4–34.8)
MCV RBC AUTO: 90.1 FL (ref 82.6–102.9)
MONOCYTES # BLD: 11 % (ref 3–12)
NITRITE, URINE: NEGATIVE
NRBC AUTOMATED: 0 PER 100 WBC
PDW BLD-RTO: 13.3 % (ref 11.8–14.4)
PH UA: 5 (ref 5–8)
PLATELET # BLD: 290 K/UL (ref 138–453)
PLATELET ESTIMATE: ABNORMAL
PMV BLD AUTO: 9.8 FL (ref 8.1–13.5)
POTASSIUM SERPL-SCNC: 3.8 MMOL/L (ref 3.7–5.3)
PROTEIN UA: NEGATIVE
RBC # BLD: 5.05 M/UL (ref 3.95–5.11)
RBC # BLD: ABNORMAL 10*6/UL
SEG NEUTROPHILS: 71 % (ref 36–65)
SEGMENTED NEUTROPHILS ABSOLUTE COUNT: 10.16 K/UL (ref 1.5–8.1)
SODIUM BLD-SCNC: 133 MMOL/L (ref 135–144)
SPECIFIC GRAVITY UA: 1.02 (ref 1–1.03)
TURBIDITY: CLEAR
URINE HGB: NEGATIVE
UROBILINOGEN, URINE: NORMAL
WBC # BLD: 14.1 K/UL (ref 3.5–11.3)
WBC # BLD: ABNORMAL 10*3/UL

## 2021-07-20 PROCEDURE — 6370000000 HC RX 637 (ALT 250 FOR IP): Performed by: NURSE PRACTITIONER

## 2021-07-20 PROCEDURE — 81003 URINALYSIS AUTO W/O SCOPE: CPT

## 2021-07-20 PROCEDURE — 51798 US URINE CAPACITY MEASURE: CPT

## 2021-07-20 PROCEDURE — 36415 COLL VENOUS BLD VENIPUNCTURE: CPT

## 2021-07-20 PROCEDURE — 71045 X-RAY EXAM CHEST 1 VIEW: CPT

## 2021-07-20 PROCEDURE — 6360000002 HC RX W HCPCS: Performed by: PSYCHIATRY & NEUROLOGY

## 2021-07-20 PROCEDURE — 87205 SMEAR GRAM STAIN: CPT

## 2021-07-20 PROCEDURE — 97530 THERAPEUTIC ACTIVITIES: CPT

## 2021-07-20 PROCEDURE — 85025 COMPLETE CBC W/AUTO DIFF WBC: CPT

## 2021-07-20 PROCEDURE — 94761 N-INVAS EAR/PLS OXIMETRY MLT: CPT

## 2021-07-20 PROCEDURE — 87150 DNA/RNA AMPLIFIED PROBE: CPT

## 2021-07-20 PROCEDURE — 6370000000 HC RX 637 (ALT 250 FOR IP): Performed by: PSYCHIATRY & NEUROLOGY

## 2021-07-20 PROCEDURE — 2060000000 HC ICU INTERMEDIATE R&B

## 2021-07-20 PROCEDURE — 51701 INSERT BLADDER CATHETER: CPT

## 2021-07-20 PROCEDURE — 80048 BASIC METABOLIC PNL TOTAL CA: CPT

## 2021-07-20 PROCEDURE — 6360000002 HC RX W HCPCS: Performed by: NURSE PRACTITIONER

## 2021-07-20 PROCEDURE — 51702 INSERT TEMP BLADDER CATH: CPT

## 2021-07-20 PROCEDURE — 99232 SBSQ HOSP IP/OBS MODERATE 35: CPT | Performed by: PSYCHIATRY & NEUROLOGY

## 2021-07-20 PROCEDURE — 87040 BLOOD CULTURE FOR BACTERIA: CPT

## 2021-07-20 PROCEDURE — 2580000003 HC RX 258: Performed by: PSYCHIATRY & NEUROLOGY

## 2021-07-20 PROCEDURE — 6370000000 HC RX 637 (ALT 250 FOR IP): Performed by: FAMILY MEDICINE

## 2021-07-20 PROCEDURE — 97110 THERAPEUTIC EXERCISES: CPT

## 2021-07-20 PROCEDURE — 2700000000 HC OXYGEN THERAPY PER DAY

## 2021-07-20 PROCEDURE — 6370000000 HC RX 637 (ALT 250 FOR IP): Performed by: STUDENT IN AN ORGANIZED HEALTH CARE EDUCATION/TRAINING PROGRAM

## 2021-07-20 RX ORDER — DIPHENHYDRAMINE HYDROCHLORIDE 50 MG/ML
25 INJECTION INTRAMUSCULAR; INTRAVENOUS ONCE
Status: COMPLETED | OUTPATIENT
Start: 2021-07-20 | End: 2021-07-20

## 2021-07-20 RX ORDER — METOCLOPRAMIDE HYDROCHLORIDE 5 MG/ML
10 INJECTION INTRAMUSCULAR; INTRAVENOUS ONCE
Status: COMPLETED | OUTPATIENT
Start: 2021-07-20 | End: 2021-07-20

## 2021-07-20 RX ORDER — DEXAMETHASONE SODIUM PHOSPHATE 10 MG/ML
10 INJECTION INTRAMUSCULAR; INTRAVENOUS ONCE
Status: COMPLETED | OUTPATIENT
Start: 2021-07-20 | End: 2021-07-20

## 2021-07-20 RX ORDER — AMITRIPTYLINE HYDROCHLORIDE 10 MG/1
10 TABLET, FILM COATED ORAL NIGHTLY
Status: DISCONTINUED | OUTPATIENT
Start: 2021-07-20 | End: 2021-07-22

## 2021-07-20 RX ADMIN — SODIUM CHLORIDE, PRESERVATIVE FREE 10 ML: 5 INJECTION INTRAVENOUS at 08:35

## 2021-07-20 RX ADMIN — MYCOPHENOLATE MOFETIL 300 MG: 500 TABLET ORAL at 20:27

## 2021-07-20 RX ADMIN — CHOLESTYRAMINE 4 G: 4 POWDER, FOR SUSPENSION ORAL at 08:35

## 2021-07-20 RX ADMIN — HYDRALAZINE HYDROCHLORIDE 25 MG: 25 TABLET ORAL at 08:36

## 2021-07-20 RX ADMIN — SODIUM CHLORIDE, PRESERVATIVE FREE 10 ML: 5 INJECTION INTRAVENOUS at 20:31

## 2021-07-20 RX ADMIN — DIPHENHYDRAMINE HYDROCHLORIDE 25 MG: 50 INJECTION, SOLUTION INTRAMUSCULAR; INTRAVENOUS at 12:06

## 2021-07-20 RX ADMIN — HYDROCHLOROTHIAZIDE 25 MG: 25 TABLET ORAL at 12:11

## 2021-07-20 RX ADMIN — CHOLESTYRAMINE 4 G: 4 POWDER, FOR SUSPENSION ORAL at 20:27

## 2021-07-20 RX ADMIN — ENOXAPARIN SODIUM 40 MG: 40 INJECTION SUBCUTANEOUS at 08:36

## 2021-07-20 RX ADMIN — AMLODIPINE BESYLATE 10 MG: 10 TABLET ORAL at 08:36

## 2021-07-20 RX ADMIN — LOSARTAN POTASSIUM 100 MG: 50 TABLET, FILM COATED ORAL at 08:36

## 2021-07-20 RX ADMIN — HYDRALAZINE HYDROCHLORIDE 50 MG: 50 TABLET, FILM COATED ORAL at 20:32

## 2021-07-20 RX ADMIN — DEXAMETHASONE SODIUM PHOSPHATE 10 MG: 10 INJECTION INTRAMUSCULAR; INTRAVENOUS at 12:06

## 2021-07-20 RX ADMIN — HYDRALAZINE HYDROCHLORIDE 25 MG: 25 TABLET ORAL at 16:24

## 2021-07-20 RX ADMIN — ACETAMINOPHEN 650 MG: 325 TABLET ORAL at 08:36

## 2021-07-20 RX ADMIN — MAGNESIUM GLUCONATE 500 MG ORAL TABLET 400 MG: 500 TABLET ORAL at 08:36

## 2021-07-20 RX ADMIN — QUETIAPINE FUMARATE 25 MG: 25 TABLET ORAL at 20:27

## 2021-07-20 RX ADMIN — METOCLOPRAMIDE 10 MG: 5 INJECTION, SOLUTION INTRAMUSCULAR; INTRAVENOUS at 12:06

## 2021-07-20 ASSESSMENT — PAIN SCALES - GENERAL
PAINLEVEL_OUTOF10: 10
PAINLEVEL_OUTOF10: 10
PAINLEVEL_OUTOF10: 1
PAINLEVEL_OUTOF10: 10
PAINLEVEL_OUTOF10: 10

## 2021-07-20 ASSESSMENT — PAIN SCALES - PAIN ASSESSMENT IN ADVANCED DEMENTIA (PAINAD)
BREATHING: 0
CONSOLABILITY: 1
FACIALEXPRESSION: 0
NEGVOCALIZATION: 1
BODYLANGUAGE: 0
CONSOLABILITY: 1
TOTALSCORE: 2
CONSOLABILITY: 1

## 2021-07-20 ASSESSMENT — PAIN DESCRIPTION - PROGRESSION

## 2021-07-20 ASSESSMENT — PAIN DESCRIPTION - LOCATION
LOCATION: HEAD

## 2021-07-20 ASSESSMENT — PAIN DESCRIPTION - PAIN TYPE
TYPE: ACUTE PAIN

## 2021-07-20 ASSESSMENT — PAIN DESCRIPTION - FREQUENCY: FREQUENCY: CONTINUOUS

## 2021-07-20 ASSESSMENT — PAIN DESCRIPTION - DESCRIPTORS: DESCRIPTORS: HEADACHE

## 2021-07-20 ASSESSMENT — PAIN DESCRIPTION - ONSET: ONSET: ON-GOING

## 2021-07-20 NOTE — PROGRESS NOTES
Daily Progress Note  Neuro Critical Care    Patient Name: Sujey Torres  Patient : 1940  Room/Bed: 0897/0357-77  Code Status: Full Code  Allergies: Allergies   Allergen Reactions    Clonidine Derivatives     Morphine     Sulfa Antibiotics        CHIEF COMPLAINT:      ICH     INTERVAL HISTORY    Initial Presentation (Admitted 21): The patient is a 78 y. o. female with a history of HTN, HLD and CVA () who presented as a transfer from [facility] with a right basal ganglia IPH.  Patient initially presented to [facility] with acute onset left sided weakness.  LKW around 1700, about 30 minutes prior to arrival at [facility].  Initial .  Patient had stopped taking her antihypertensive medications 3 days prior.  [facility].  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  Started on Cardene infusion for BP control.  Transferred to [facility] for further evaluation and management.  On arrival to [facility] ED, NIH 11.  On Cardene infusion for BP control.  CT Head stable.  CTA Head/Neck unremarkable.  Neurosurgery consulted.  Admitted to Neuro ICU for close monitoring.     Hospital Course:   : CT head stable. Home oral antihypertensives restarted. Febrile, UA sent. : Cardene infusion weaned off around 0300. Norvasc increased to 10 mg QD, remains off Cardene infusion. Discontinue Keppra, monitor off AEDs. Patient complaining of headache this morning, received Magnesium, caffeine and fluid bolus with some improvement. Headache continued this afternoon, will start Decadron 4mg q8h.   7/15: Mildly hypertensive intermittently, continue to monitor. Clinical exam stable. Discharge planning. : Hypertensive overnight with SBP into the 180's, received a dose of Labetalol and Hydralazine with some improvement. Patient and nursing reports she was very anxious last night which is thought to have contributed to elevated blood pressure.   She was given 10mg IV Geodon with improvement in anxiety and hypertension and she was able to sleep. BP trend so far this morning appears improved. Continue to follow and consider adding additional antihypertensive agent based on trend. Clinical exam stable. Patient complaining of headache this afternoon. Reports right posterior neck pain and right head pain. Flexeril 5mg PO given once for neck pain/muscle spasm. Will given another dose of Caffeine and Magnesium to help with headache. Decadron to continue through today. Case management working on discharge planning. 7/16: Report of possible sundowning, received seroquel. This morning, reports a headache on R frontal with associated R sided neck pain. Reports relief from flexeril dose yesterday.        7/17: Patient became increasingly uncomfortable with her headache. Requested and required more heavy dose medications to control pain. Patient received one-time dose of 0.5 Dilaudid and Benadryl with Compazine and was able to sleep through the evening. Patient still uncomfortable on repeat visits today   7/18/2021. Patient given home dose Excedrin which she requested. 7/19/2021: Patient given home dose of Excedrin. She continues to complain of headache.  7/20/21: Unable to discharge patient to SNF today as the peer to peer failed. We will add Elavil for patient's headache.         CURRENT MEDICATIONS:  SCHEDULED MEDICATIONS:   QUEtiapine  25 mg Oral Nightly    gabapentin  300 mg Oral Nightly    hydrALAZINE  25 mg Oral BID- 8&2    hydrALAZINE  50 mg Oral Nightly    magnesium oxide  400 mg Oral Daily    cholestyramine light  4 g Oral BID    amLODIPine  10 mg Oral Daily    enoxaparin  40 mg Subcutaneous Daily    losartan  100 mg Oral Daily    And    hydroCHLOROthiazide  25 mg Oral Daily    sodium chloride flush  5-40 mL Intravenous 2 times per day     CONTINUOUS INFUSIONS:   sodium chloride       PRN MEDICATIONS:   diphenoxylate-atropine, labetalol, sodium chloride flush, sodium chloride, acetaminophen, ondansetron **OR** ondansetron    VITALS:  Temperature Range: Temp: 98 °F (36.7 °C) Temp  Av.7 °F (37.1 °C)  Min: 98 °F (36.7 °C)  Max: 99.2 °F (37.3 °C)  BP Range: Systolic (28RNW), DNP:597 , Min:103 , SHAYLEE:928     Diastolic (47SUU), VRW:04, Min:66, Max:98    Pulse Range: Pulse  Av.5  Min: 79  Max: 95  Respiration Range: Resp  Av.7  Min: 17  Max: 19  Current Pulse Ox: SpO2: 92 %  24HR Pulse Ox Range: SpO2  Av %  Min: 90 %  Max: 94 %  Patient Vitals for the past 12 hrs:   BP Temp Temp src Pulse Resp SpO2   21 2343 103/66 98 °F (36.7 °C) Axillary 79 19 92 %   21 (!) 138/98        21 (!) 138/98   95 17    21 (!) 171/89 98.4 °F (36.9 °C) Oral 92 17 94 %         RECENT LABS:   Lab Results   Component Value Date    WBC 14.1 (H) 2021    HGB 15.0 2021    HCT 45.5 2021     2021    CHOL 144 2021    TRIG 194 (H) 2021    HDL 49 2021    ALT 35 (H) 2021    AST 27 2021     (L) 2021    K 3.8 2021    CL 98 2021    CREATININE 0.51 2021    BUN 22 2021    CO2 23 2021    TSH 1.350 2021    INR 1.0 2021    LABA1C 6.3 (H) 2021     24 HOUR INTAKE/OUTPUT:    Intake/Output Summary (Last 24 hours) at 2021 0715  Last data filed at 2021 0600  Gross per 24 hour   Intake    Output 700 ml   Net -700 ml       Labs and Images reviewed with:  [] Dr. Chaya Lehman. Evelina    [x] Dr. Coreen Wooten  [] Dr. Ana Sandoval  [] There are no new interval images to review. PHYSICAL EXAM     Awake alert answering questions appropriately. Photophobia. Moving right upper and lower extremities 5/5 strength. 0/5 strength left upper and left lower extremity. DRAINS:  [] There are no drains for Neuro Critical Care to monitor at this time.      ASSESSMENT AND PLAN:     The patient is a 79 yo female with a history of  HTN, HLD and JERMAINE (2004) who presented as a transfer from [facility] with a right basal ganglia IPH.   initially presented to [facility] ED with acute onset left sided weakness.  Found to be hypertensive with .  CT Head revealed right basal ganglia IPH with intraventricular extension.      Patient cleared to be discharged to SNF. Attempted [id number] now 3 different times and have not been able to connect. Added Elavil for headache.     NEUROLOGIC:  - Acute right basal ganglia IPH with intraventricular extension  - Etiology likely hypertension  - CTA Head/Neck unremarkable  - Repeat CT Head 7/13 stable  - Neurosurgery signed off; F/U 6 weeks with Dr. Ana Rivas  - Jacqui Cortez discontinued, monitor off AED  - Headache management; Decadron 4mg Q8h (total 3 days, completed 7/16), Will repeat Flexeril 5mg again, OOB mobility. Reordered Mag oxide home dose of 250mg once daily however only 400mg available in pharmacy, substituted  - Goal SBP<160  - Neuro checks per protocol  -We will add Elavil for headache     CARDIOVASCULAR:  - Goal SBP<160  - PRN Labetalol  - Essential HTN  - Continue Losartan-HCTZ 100-25mg daily, Norvasc 10mg QD  - Added Hydralazine 25 mg in AM, 25mg in afternoon, 50mg at night  - Troponin 11, EKG sinus rhythm, left anterior fascicular block  - Echo EF 36-66%, grade I diastolic dysfunction  - Lipid panel; LDL 56, cholesterol 144 - no need for statin in setting of ICH  - Continue telemetry  - Make Excedrin as needed if needed.     PULMONARY:  - Maintaining O2 sats on 0-2L nasal canula  - Wean O2 as tolerates  - Incentive spirometry     RENAL/FLUID/ELECTROLYTE:  - Normal renal functioning  - BUN 25/ Creatinine 0.69  - Monitor I&Os, adequate urine output  - No maintenance IVF, tolerating PO  - Replace electrolytes PRN  - Daily BMP     GI/NUTRITION:  NUTRITION:  ADULT DIET;  Regular   - Passed speech bedside swallow eval; Easy to chew, thin liquids  - Tolerating diet  - Chronic diarrhea  - Resume home Cholestyramine 4g BID, Lomotil 2.5-0.025mg QID prn  - GI prophylaxis: Pepcid while on steroids only     ID:  - Afebrile, 36.9C  - Mild leukocytosis, WBC downtrending 11.4 from 13.6  - Procal 0.04; not suggestive of worsening infection  - UA consistent with UTI  - Continue Keflex 500mg BID, day 4/5 days for UTI  - Daily CBC     HEME:   - H&H 13.7/42.3  - Platelets 289  - Daily CBC     ENDOCRINE:  - Continue to monitor blood glucose, goal <180  - Hemoglobin A1C 6.3  - Glucose well controlled     OTHER:  - PT/OT/ST   - PM&R following; low level  - Case management following; referral sent to [address] [place] [address] (she lives in their assisted living)  - Code Status: FULL     PROPHYLAXIS:  Stress ulcer: Pepcid discontinued as steroids completed     DVT PROPHYLAXIS:  - SCD sleeves - Thigh High   - Lovenox 40mg QD      DISPOSITION:  Discharge to SNF    We will continue to follow along. For any changes in exam or patient status please contact Neuro Critical Care.       Hannah Saab MD  Neuro Critical Care  7/20/2021     7:15 AM

## 2021-07-20 NOTE — PROGRESS NOTES
Physical Therapy  Facility/Department: [facility] NEURO  Daily Treatment Note  NAME: Bishop Cazares  : 1940  MRN: [id number]    Date of Service: 2021    Discharge Recommendations:  Patient would benefit from continued therapy after discharge   PT Equipment Recommendations  Equipment Needed: No (pt will need rehab, will defer equipment recommendations to them)    Assessment    Pt very lethargic, difficult to awaken and keep awake during PT session; she was able to state her name, location and why she's in the hospital accurately; poor head/trunk control, unable to stand this date in part d/t increased lethargy (per RN, she's received meds that can cause drowsiness). Poor tolerance to activity this date. Body structures, Functions, Activity limitations: Decreased functional mobility ; Decreased strength;Decreased safe awareness;Decreased balance;Decreased sensation;Decreased endurance;Decreased ROM; Decreased vision/visual deficit  Prognosis: Fair  Decision Making: High Complexity  PT Education: Goals;PT Role;Plan of Care;General Safety;Transfer Training;Orientation; Family Education;Equipment;Disease Specific Education;Gait Training;Functional Mobility Training; Injury Prevention  Barriers to Learning: lethargy, L negelct  REQUIRES PT FOLLOW UP: Yes  Activity Tolerance  Activity Tolerance: Patient limited by fatigue;Patient limited by pain; Patient limited by endurance; Patient limited by cognitive status (pt very lethargic this date, difficult to keep awake)     Patient Diagnosis(es): The encounter diagnosis was Intracranial hemorrhage ([address]      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 has a past surgical history that includes Appendectomy; Cholecystectomy; hernia repair; Tonsillectomy; Tubal ligation; and Hysterectomy.     Restrictions  Restrictions/Precautions  Restrictions/Precautions: Seizure, General Precautions, Fall Risk, Up as Tolerated  Required Braces or Orthoses?: No  Position Activity Restriction  Other position/activity restrictions: SBP <160, Zeng cath, NG Tube Feed, High Flow O2  Subjective   General  Response To Previous Treatment: Patient unable to report, no changes reported from family or staff  Family / Caregiver Present: No  Subjective  Subjective: pt very lethargic, c/o HA intermittently  Pain Screening  Patient Currently in Pain: Yes  Pain Assessment  Pain Assessment:  (pt too sleepy to rank)  Pain Location: Head  Pain Descriptors: Headache  Pain Frequency: Continuous  Pain Onset: On-going  Clinical Progression:  (pt unable to state)  Vital Signs  Patient Currently in Pain: Yes       Orientation  Orientation  Overall Orientation Status: Impaired  Orientation Level: Oriented to place;Oriented to situation;Oriented to person (pt extremely lethargic and difficult to keep awake for orientation questions; per RN, she has received meds that would make her sleepy ie Benadryl)     Objective   Bed mobility  Rolling to Left: Maximum assistance  Rolling to Right: Maximum assistance  Supine to Sit: Maximum assistance  Sit to Supine: Maximum assistance  Scooting: Maximal assistance  Transfers  Sit to Stand: Maximum Assistance (pt unable to come to full stand this date; attempted x 2)  Stand to sit: Maximum Assistance  Bed to Chair: Unable to assess  Stand Pivot Transfers: Unable to assess  Squat Pivot Transfers: Unable to assess  Lateral Transfers: Maximum Assistance  Ambulation  Ambulation?: No  Stairs/Curb  Stairs?: No     Balance  Posture: Poor  Sitting - Static: Fair;-  Sitting - Dynamic: Poor;+  Other exercises  Other exercises 1: PROM TIN/LEs x 10 reps  Other exercises 2: AAROM RU/LEs  Other exercises 3: dangled EOB ~8 minutes with mod to max A+1   AM-PAC Score  AM-PAC Inpatient Mobility Raw Score : 8 (07/20/21 1417)  AM-PAC Inpatient T-Scale Score : 28.52 (07/20/21 1417)  Mobility Inpatient CMS 0-100% Score: 86.62 (07/20/21 1417)  Mobility Inpatient CMS G-Code Modifier : CM (07/20/21 1417)          Goals  Short term goals  Time Frame for Short term goals: 12 visits  Short term goal 1: Perform bed mob Ariella+1  Short term goal 2: Perform transfers modA+1  Short term goal 3: Ambulate 22' modA+2 w/ appropriate device  Short term goal 4: Dangle EOB ~15 min SBA  Short term goal 5: Facilitate active movement LUE/LLE  Patient Goals   Patient goals : Go home    Plan    Plan  Times per week: 5-6 visits weekly  Times per day: Daily  Current Treatment Recommendations: Strengthening, ROM, Balance Training, Functional Mobility Training, Gait Training, Endurance Training, Transfer Training, Safety Education & Training, Wheelchair Mobility Training, Neuromuscular Re-education, Cognitive Reorientation, Pain Management, Home Exercise Program, Patient/Caregiver Education & Training, Positioning  Safety Devices  Type of devices: Bed alarm in place, Call light within reach, Gait belt, Patient at risk for falls, Left in bed  Restraints  Initially in place: No     Therapy Time   Individual Concurrent Group Co-treatment   Time In 1337         Time Out 1407         Minutes 30                 Selena Carrillo, [place]

## 2021-07-20 NOTE — PLAN OF CARE
Problem: Falls - Risk of:  Goal: Will remain free from falls  Description: Will remain free from falls  Outcome: Ongoing  Goal: Absence of physical injury  Description: Absence of physical injury  Outcome: Ongoing     Problem: HEMODYNAMIC STATUS  Goal: Patient has stable vital signs and fluid balance  Outcome: Ongoing     Problem: ACTIVITY INTOLERANCE/IMPAIRED MOBILITY  Goal: Mobility/activity is maintained at optimum level for patient  7/20/2021 1636 by Jeniffer Pittman RN  Outcome: Ongoing  7/20/2021 0536 by Mira Hernandez RN  Outcome: Ongoing     Problem: COMMUNICATION IMPAIRMENT  Goal: Ability to express needs and understand communication  Outcome: Ongoing     Problem: Coping:  Goal: Ability to cope will improve  Description: Ability to cope will improve  Outcome: Ongoing  Goal: Ability to identify appropriate support needs will improve  Description: Ability to identify appropriate support needs will improve  Outcome: Ongoing     Problem: Health Behavior:  Goal: Ability to manage health-related needs will improve  Description: Ability to manage health-related needs will improve  Outcome: Ongoing     Problem: Physical Regulation:  Goal: Signs of adequate cerebral perfusion will increase  Description: Signs of adequate cerebral perfusion will increase  Outcome: Ongoing  Goal: Ability to maintain a stable neurologic state will improve  Description: Ability to maintain a stable neurologic state will improve  Outcome: Ongoing     Problem: Safety:  Goal: Ability to remain free from injury will improve  Description: Ability to remain free from injury will improve  Outcome: Ongoing     Problem: Self-Concept:  Goal: Level of anxiety will decrease  Description: Level of anxiety will decrease  Outcome: Ongoing  Goal: Ability to verbalize feelings about condition will improve  Description: Ability to verbalize feelings about condition will improve  Outcome: Ongoing     Problem: Pain:  Goal: Pain level will decrease  Description: Pain level will decrease  Outcome: Ongoing  Goal: Control of acute pain  Description: Control of acute pain  Outcome: Ongoing  Goal: Control of chronic pain  Description: Control of chronic pain  Outcome: Ongoing     Problem: Nutrition  Goal: Optimal nutrition therapy  Outcome: Ongoing     Problem: Skin Integrity:  Goal: Will show no infection signs and symptoms  Description: Will show no infection signs and symptoms  Outcome: Ongoing  Goal: Absence of new skin breakdown  Description: Absence of new skin breakdown  Outcome: Ongoing

## 2021-07-20 NOTE — PLAN OF CARE
Problem: ACTIVITY INTOLERANCE/IMPAIRED MOBILITY  Goal: Mobility/activity is maintained at optimum level for patient  7/20/2021 0536 by Pardeep Ochoa RN  Outcome: Ongoing   Neuro assessment completed, fall precautions in place, aspirations precautions in place, assess for barriers in communication and mobility, interventions to assist in communication and mobility in place, encouraged to call for assistance, adaptive devices used as needed, assess emotional state and support offered, encouraged patient to communicate by available means, and support systems included in patient care.

## 2021-07-20 NOTE — PROGRESS NOTES
Occupational [address] [employer]  Occupational Therapy Not Seen Note    DATE: 2021  Name: Davey Knight  : 1940  MRN: [id number]    Patient not available for Occupational Therapy due to:    [] Testing:    [] Hemodialysis    [] Blood Transfusion in Progress    []Refusal by Patient:    [] Surgery/Procedure:    [] Strict Bedrest    [] Sedation    [] Spine Precautions     [] Pt with medical decline and not appropriate for continued therapy services. Spoke with pt/family and OT services to be defered. [] Pt independent with functional mobility and functional tasks. Pt with no OT acute care needs at this time, will defer OT eval.    [x] Other: Pt had a severe migraine headache and was given a cocktail of meds. Pt is sound asleep and not able to participate in OT tx.       Next Scheduled Treatment: 21    [address]/L

## 2021-07-20 NOTE — PROGRESS NOTES
Dr. Yonny Joel notified through PS that BS was 324. Patient still has not voided on her own. Orders received to place a martines.  Martines placed at [phone]

## 2021-07-20 NOTE — CARE COORDINATION
[facility] notified of peer to peer needing to be completed. Information was given to him.   Awaiting results

## 2021-07-20 NOTE — PROGRESS NOTES
[address]  Speech Language Pathology    Date: 7/20/2021  Patient Name: Rogelio Sol  YOB: 1940   AGE: 78 y.o. MRN: [id number]        Patient Not Available for Speech Therapy     Due to:  [] Testing  [] Hemodialysis  [] Cancelled by RN  [] Surgery   [] Intubation/Sedation/Pain Medication  [] Medical instability  [x] Other: Pt sleeping, unable to wake. Next scheduled treatment: 7/21/2021    Completed by: [address]  Clinician    Cosigned By: Jolene Louis S.CCC/SLP

## 2021-07-21 LAB
ABSOLUTE EOS #: <0.03 K/UL (ref 0–0.44)
ABSOLUTE IMMATURE GRANULOCYTE: 0.06 K/UL (ref 0–0.3)
ABSOLUTE LYMPH #: 1.19 K/UL (ref 1.1–3.7)
ABSOLUTE MONO #: 0.71 K/UL (ref 0.1–1.2)
ANION GAP SERPL CALCULATED.3IONS-SCNC: 15 MMOL/L (ref 9–17)
BASOPHILS # BLD: 0 % (ref 0–2)
BASOPHILS ABSOLUTE: <0.03 K/UL (ref 0–0.2)
BUN BLDV-MCNC: 39 MG/DL (ref 8–23)
BUN/CREAT BLD: ABNORMAL (ref 9–20)
CALCIUM SERPL-MCNC: 8.8 MG/DL (ref 8.6–10.4)
CHLORIDE BLD-SCNC: 98 MMOL/L (ref 98–107)
CO2: 20 MMOL/L (ref 20–31)
CREAT SERPL-MCNC: 0.61 MG/DL (ref 0.5–0.9)
CULTURE: ABNORMAL
DIFFERENTIAL TYPE: ABNORMAL
EOSINOPHILS RELATIVE PERCENT: 0 % (ref 1–4)
GFR AFRICAN AMERICAN: >60 ML/MIN
GFR NON-AFRICAN AMERICAN: >60 ML/MIN
GFR SERPL CREATININE-BSD FRML MDRD: ABNORMAL ML/MIN/{1.73_M2}
GFR SERPL CREATININE-BSD FRML MDRD: ABNORMAL ML/MIN/{1.73_M2}
GLUCOSE BLD-MCNC: 174 MG/DL (ref 70–99)
HCT VFR BLD CALC: 44.8 % (ref 36.3–47.1)
HEMOGLOBIN: 14.4 G/DL (ref 11.9–15.1)
IMMATURE GRANULOCYTES: 1 %
LYMPHOCYTES # BLD: 11 % (ref 24–43)
Lab: ABNORMAL
MCH RBC QN AUTO: 29.6 PG (ref 25.2–33.5)
MCHC RBC AUTO-ENTMCNC: 32.1 G/DL (ref 28.4–34.8)
MCV RBC AUTO: 92 FL (ref 82.6–102.9)
MONOCYTES # BLD: 7 % (ref 3–12)
NRBC AUTOMATED: 0 PER 100 WBC
PDW BLD-RTO: 13.4 % (ref 11.8–14.4)
PLATELET # BLD: 310 K/UL (ref 138–453)
PLATELET ESTIMATE: ABNORMAL
PMV BLD AUTO: 9.8 FL (ref 8.1–13.5)
POTASSIUM SERPL-SCNC: 3.9 MMOL/L (ref 3.7–5.3)
RBC # BLD: 4.87 M/UL (ref 3.95–5.11)
RBC # BLD: ABNORMAL 10*6/UL
SEG NEUTROPHILS: 82 % (ref 36–65)
SEGMENTED NEUTROPHILS ABSOLUTE COUNT: 8.76 K/UL (ref 1.5–8.1)
SODIUM BLD-SCNC: 133 MMOL/L (ref 135–144)
SPECIMEN DESCRIPTION: ABNORMAL
WBC # BLD: 10.7 K/UL (ref 3.5–11.3)
WBC # BLD: ABNORMAL 10*3/UL

## 2021-07-21 PROCEDURE — 87040 BLOOD CULTURE FOR BACTERIA: CPT

## 2021-07-21 PROCEDURE — 85025 COMPLETE CBC W/AUTO DIFF WBC: CPT

## 2021-07-21 PROCEDURE — 6360000002 HC RX W HCPCS: Performed by: NURSE PRACTITIONER

## 2021-07-21 PROCEDURE — 6370000000 HC RX 637 (ALT 250 FOR IP): Performed by: STUDENT IN AN ORGANIZED HEALTH CARE EDUCATION/TRAINING PROGRAM

## 2021-07-21 PROCEDURE — 80048 BASIC METABOLIC PNL TOTAL CA: CPT

## 2021-07-21 PROCEDURE — 97535 SELF CARE MNGMENT TRAINING: CPT

## 2021-07-21 PROCEDURE — 97110 THERAPEUTIC EXERCISES: CPT

## 2021-07-21 PROCEDURE — 6370000000 HC RX 637 (ALT 250 FOR IP): Performed by: NURSE PRACTITIONER

## 2021-07-21 PROCEDURE — 99232 SBSQ HOSP IP/OBS MODERATE 35: CPT | Performed by: PSYCHIATRY & NEUROLOGY

## 2021-07-21 PROCEDURE — 2580000003 HC RX 258: Performed by: PSYCHIATRY & NEUROLOGY

## 2021-07-21 PROCEDURE — 36415 COLL VENOUS BLD VENIPUNCTURE: CPT

## 2021-07-21 PROCEDURE — 6370000000 HC RX 637 (ALT 250 FOR IP): Performed by: PSYCHIATRY & NEUROLOGY

## 2021-07-21 PROCEDURE — 97530 THERAPEUTIC ACTIVITIES: CPT

## 2021-07-21 PROCEDURE — 90792 PSYCH DIAG EVAL W/MED SRVCS: CPT | Performed by: PSYCHIATRY & NEUROLOGY

## 2021-07-21 PROCEDURE — 2060000000 HC ICU INTERMEDIATE R&B

## 2021-07-21 PROCEDURE — 6370000000 HC RX 637 (ALT 250 FOR IP): Performed by: FAMILY MEDICINE

## 2021-07-21 RX ORDER — OXYCODONE HYDROCHLORIDE AND ACETAMINOPHEN 5; 325 MG/1; MG/1
1 TABLET ORAL NIGHTLY PRN
Status: DISCONTINUED | OUTPATIENT
Start: 2021-07-21 | End: 2021-07-22

## 2021-07-21 RX ORDER — GABAPENTIN 300 MG/1
300 CAPSULE ORAL 2 TIMES DAILY
Status: DISCONTINUED | OUTPATIENT
Start: 2021-07-21 | End: 2021-07-26 | Stop reason: HOSPADM

## 2021-07-21 RX ADMIN — ACETAMINOPHEN 650 MG: 325 TABLET ORAL at 15:17

## 2021-07-21 RX ADMIN — ACETAMINOPHEN 650 MG: 325 TABLET ORAL at 05:59

## 2021-07-21 RX ADMIN — CHOLESTYRAMINE 4 G: 4 POWDER, FOR SUSPENSION ORAL at 21:50

## 2021-07-21 RX ADMIN — ENOXAPARIN SODIUM 40 MG: 40 INJECTION SUBCUTANEOUS at 10:12

## 2021-07-21 RX ADMIN — MAGNESIUM GLUCONATE 500 MG ORAL TABLET 400 MG: 500 TABLET ORAL at 10:11

## 2021-07-21 RX ADMIN — HYDRALAZINE HYDROCHLORIDE 25 MG: 25 TABLET ORAL at 15:17

## 2021-07-21 RX ADMIN — AMITRIPTYLINE HYDROCHLORIDE 10 MG: 10 TABLET, FILM COATED ORAL at 21:49

## 2021-07-21 RX ADMIN — HYDRALAZINE HYDROCHLORIDE 50 MG: 50 TABLET, FILM COATED ORAL at 21:49

## 2021-07-21 RX ADMIN — HYDRALAZINE HYDROCHLORIDE 25 MG: 25 TABLET ORAL at 10:11

## 2021-07-21 RX ADMIN — HYDROCHLOROTHIAZIDE 25 MG: 25 TABLET ORAL at 15:17

## 2021-07-21 RX ADMIN — SODIUM CHLORIDE, PRESERVATIVE FREE 10 ML: 5 INJECTION INTRAVENOUS at 10:12

## 2021-07-21 RX ADMIN — QUETIAPINE FUMARATE 25 MG: 25 TABLET ORAL at 21:49

## 2021-07-21 RX ADMIN — AMLODIPINE BESYLATE 10 MG: 10 TABLET ORAL at 10:12

## 2021-07-21 RX ADMIN — OXYCODONE HYDROCHLORIDE AND ACETAMINOPHEN 1 TABLET: 5; 325 TABLET ORAL at 21:59

## 2021-07-21 RX ADMIN — GABAPENTIN 300 MG: 300 CAPSULE ORAL at 10:11

## 2021-07-21 RX ADMIN — GABAPENTIN 300 MG: 300 CAPSULE ORAL at 21:49

## 2021-07-21 RX ADMIN — CHOLESTYRAMINE 4 G: 4 POWDER, FOR SUSPENSION ORAL at 10:12

## 2021-07-21 RX ADMIN — LOSARTAN POTASSIUM 100 MG: 50 TABLET, FILM COATED ORAL at 10:11

## 2021-07-21 RX ADMIN — ACETAMINOPHEN 650 MG: 325 TABLET ORAL at 10:11

## 2021-07-21 RX ADMIN — SODIUM CHLORIDE, PRESERVATIVE FREE 10 ML: 5 INJECTION INTRAVENOUS at 21:59

## 2021-07-21 ASSESSMENT — PAIN SCALES - GENERAL
PAINLEVEL_OUTOF10: 10
PAINLEVEL_OUTOF10: 10
PAINLEVEL_OUTOF10: 6
PAINLEVEL_OUTOF10: 5
PAINLEVEL_OUTOF10: 5
PAINLEVEL_OUTOF10: 6
PAINLEVEL_OUTOF10: 7
PAINLEVEL_OUTOF10: 5
PAINLEVEL_OUTOF10: 5

## 2021-07-21 ASSESSMENT — PAIN SCALES - WONG BAKER
WONGBAKER_NUMERICALRESPONSE: 0

## 2021-07-21 ASSESSMENT — PAIN DESCRIPTION - LOCATION
LOCATION: HEAD

## 2021-07-21 ASSESSMENT — PAIN DESCRIPTION - PROGRESSION

## 2021-07-21 ASSESSMENT — PAIN SCALES - PAIN ASSESSMENT IN ADVANCED DEMENTIA (PAINAD)
CONSOLABILITY: 1

## 2021-07-21 ASSESSMENT — PAIN DESCRIPTION - ORIENTATION
ORIENTATION: POSTERIOR
ORIENTATION: POSTERIOR

## 2021-07-21 ASSESSMENT — PAIN DESCRIPTION - FREQUENCY
FREQUENCY: CONTINUOUS

## 2021-07-21 ASSESSMENT — PAIN DESCRIPTION - PAIN TYPE
TYPE: CHRONIC PAIN

## 2021-07-21 ASSESSMENT — PAIN DESCRIPTION - DESCRIPTORS
DESCRIPTORS: HEADACHE

## 2021-07-21 ASSESSMENT — PAIN DESCRIPTION - ONSET: ONSET: ON-GOING

## 2021-07-21 NOTE — PROGRESS NOTES
Occupational Therapy  Facility/Department: [facility] NEURO  Daily Treatment Note  NAME: Antonina Singh  : 1940  MRN: [id number]    Date of Service: 2021    Discharge Recommendations: Further therapy recommended at discharge. The patient should be able to tolerate at least three hours of therapy per day over 5 days or 15 hours over 7 days. Patient would benefit from continued therapy after discharge d/t overall weakness. Assessment   Performance deficits / Impairments: Decreased functional mobility ; Decreased endurance;Decreased coordination;Decreased ADL status; Decreased sensation;Decreased posture;Decreased ROM; Decreased balance;Decreased strength;Decreased vision/visual deficit; Decreased safe awareness;Decreased high-level IADLs;Decreased fine motor control;Decreased cognition  Assessment: Pt will continue to require acute care and post acute care OT services to increase functional transfers/mobility, ADL's and IADL's prior to returning to previous living arrangements. Treatment Diagnosis: CVA  Prognosis: Good  REQUIRES OT FOLLOW UP: Yes  Activity Tolerance  Activity Tolerance: Patient Tolerated treatment well;Patient limited by pain  Activity Tolerance: Headache  Safety Devices  Safety Devices in place: Yes  Type of devices: Bed alarm in place;Call light within reach;Gait belt;Patient at risk for falls; Left in bed;Nurse notified         Patient Diagnosis(es): The encounter diagnosis was Intracranial hemorrhage ([facility] [address] 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    has a past surgical history that includes Appendectomy; Cholecystectomy; hernia repair; Tonsillectomy; Tubal ligation; and Hysterectomy. Restrictions  Restrictions/Precautions  Restrictions/Precautions: General Precautions, Fall Risk, Up as Tolerated  Required Braces or Orthoses?: No  Position Activity Restriction  Other position/activity restrictions: SBP <160, Zeng cath, O2 NC  Subjective   General  Chart Reviewed: Yes  Patient assessed for rehabilitation services?: Yes  Family / Caregiver Present: No  General Comment  Comments: pt and RN agreeable to therapy  Pain Assessment  Pain Assessment: 0-10  Pain Level: 10  Patient's Stated Pain Goal: No pain  Pain Type: Chronic pain  Pain Location: Head  Pain Orientation: Posterior  Pain Descriptors: Headache  Pain Frequency: Continuous  Clinical Progression: Not changed  Response to Pain Intervention: Patient Satisfied (RN gave pain meds at start of tx)  Vital Signs  Patient Currently in Pain: Yes   Orientation  Orientation  Overall Orientation Status: Within Functional Limits  Objective    ADL  Feeding: Stand by assistance;Setup; Increased time to complete;Pureed diet; Thickened liquids (able to take cup to mouth to drink)  Grooming: Stand by assistance;Minimal assistance;Setup;Verbal cueing; Increased time to complete (SBA-wash face, Min-to open and put toothpaste on toothbrush,)  UE Bathing: Unable to assess(writer washed pt's back)  UE Dressing: Maximum assistance;Setup;Verbal cueing; Increased time to complete (snap, thread arms and pull up over shoulders and tie gown)  LE Dressing: Maximum assistance (w/don & doffing of footies)  Toileting: Unable to assess (Zeng cath)      Pt in bed upon arrival. C/O HA 10/10 pain level, RN notified and Tylenol given. Pt setup w/washcloth to wash face w/SBA to complete.  Sup to sit to EOB Max assist and max assist x1 for sitting balance to complete grooming task-wash/dry/comb hair, brush teeth and writer washing pts back & changed gown. Pt leaning heavily to left side. Pt became tearful x2 d/t not situation and not talking to boyfriend. Pt states \"I didn't think he would abandon me\". Emotional support given. STS w/SS w/mod assist x2, changed pad on bed. Pt sat on seat of SS w/vc's to correct posture. Pt states feels dizzy and pt was placed back into bed and BP was taken. BP WNL and pt states dizziness subsided. PT w/pt at end of tx. Call light in reach and RN notified. Balance  Sitting Balance: Maximum assistance (sat at EOB for approx 20 min w/L lateral lean and max x1 for sitting balance, BUE placed on bed to assist w/supporting self w/poor carryover)  Standing Balance: Moderate assistance (w/SS)  Standing Balance  Time: Pt stood for approx 2 min total for transfer from EOB to SS  Activity: Static standing in SS  Bed mobility  Rolling to Left: Moderate assistance  Rolling to Right: Maximum assistance  Supine to Sit: Maximum assistance  Sit to Supine: Moderate assistance  Scooting: Maximal assistance  Transfers  Sit to stand:  Moderate assistance;2 Person assistance  Stand to sit: Moderate assistance;2 Person assistance  Transfer Comments: w/SS      Plan   Plan  Times per week: 4-5x/wk  Current Treatment Recommendations: Strengthening, Patient/Caregiver Education & Training, Home Management Training, ROM, Equipment Evaluation, Education, & procurement, Balance Training, Neuromuscular Re-education, Functional Mobility Training, Positioning, Endurance Training, Cognitive/Perceptual Training, Safety Education & Training, Self-Care / ADL         Goals  Short term goals  Time Frame for Short term goals: Patient will, by discharge:  Short term goal 1: demo UB ADLs at min A with <3 VC  Short term goal 2: demo LB ADLs at mod A with <3 VC  Short term goal 3: locate supplies on L side with <2 VC to visually scan to increase participation in ADL tasks  Short term goal 4: demo safety awareness with <3 VC to increase participation in functional

## 2021-07-21 NOTE — PROGRESS NOTES
Daily Progress Note  Neuro Critical Care    Patient Name: Sae Matos  Patient : 1940  Room/Bed: 1730/5522-51  Code Status: Full Code  Allergies: Allergies   Allergen Reactions    Clonidine Derivatives     Morphine     Sulfa Antibiotics         CHIEF COMPLAINT:      ICH     INTERVAL HISTORY    Initial Presentation (Admitted 21): The patient is a 78 y. o. female with a history of HTN, HLD and CVA () who presented as a transfer from [facility] with a right basal ganglia IPH.  Patient initially presented to [facility] ED with acute onset left sided weakness.  LKW around 1700, about 30 minutes prior to [facility].  Initial .  Patient had stopped taking her antihypertensive medications 3 days prior.  [facility] Dr [facility].  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  Started on Cardene infusion for BP control.  Transferred to [facility] for further evaluation and management.  On arrival to [facility] ED, NIH 11.  On Cardene infusion for BP control.  CT Head stable.  CTA Head/Neck unremarkable.  Neurosurgery consulted.  Admitted to Neuro ICU for close monitoring.     Hospital Course:   : CT head stable. Home oral antihypertensives restarted. Febrile, UA sent. : Cardene infusion weaned off around 0300. Norvasc increased to 10 mg QD, remains off Cardene infusion. Discontinue Keppra, monitor off AEDs. Patient complaining of headache this morning, received Magnesium, caffeine and fluid bolus with some improvement. Headache continued this afternoon, will start Decadron 4mg q8h.   7/15: Mildly hypertensive intermittently, continue to monitor.  Clinical exam stable.  Discharge planning.   : Hypertensive overnight with SBP into the 180's, received a dose of Labetalol and Hydralazine with some improvement.  Patient and nursing reports she was very anxious last night which is thought to have contributed to elevated blood pressure.  She was given 10mg IV Geodon with improvement in anxiety and hypertension and she was able to sleep.  BP trend so far this morning appears improved.  Continue to follow and consider adding additional antihypertensive agent based on trend.  Clinical exam stable.  Patient complaining of headache this afternoon.  Reports right posterior neck pain and right head pain.  Flexeril 5mg PO given once for neck pain/muscle spasm.  Will given another dose of Caffeine and Magnesium to help with headache.  Decadron to continue through today.  Case management working on discharge planning. 7/16: Report of possible sundowning, received seroquel. This morning, reports a headache on R frontal with associated R sided neck pain. Reports relief from flexeril dose yesterday.        7/17: Patient became increasingly uncomfortable with her headache.  Requested and required more heavy dose medications to control pain.  Patient received one-time dose of 0.5 Dilaudid and Benadryl with Compazine and was able to sleep through the evening.  Patient still uncomfortable on repeat visits today  7/18/2021.  Patient given home dose Excedrin which she requested. 7/19/2021: Patient given home dose of Excedrin. She continues to complain of headache.  7/20/21: Unable to discharge patient to SNF today as the peer to peer failed. We will add Elavil for patient's headache.  7/21: Patient was prescribed Elavil last night however the nursing staff did not give it as they thought that the patient did not need it. I did explain to nursing staff that the Elavil was prescribed for the headache as mentioned in my note and not as a sleeping aid. Today the patient stated that her headache was so bad that she wanted to kill herself\".   The patient met with telepsych and telepsych is recommending the patient be admitted to a geriatric psychiatric unit.         CURRENT MEDICATIONS:  SCHEDULED MEDICATIONS:   gabapentin  300 mg Oral BID    amitriptyline  10 mg Oral Nightly    QUEtiapine Aleksey Ramirez  [] Dr. Martin Dural  [] There are no new interval images to review. PHYSICAL EXAM       CONSTITUTIONAL:   Alert, awake, answering questions appropriately, moaning in pain, states that she wants to kill herself   HEAD:  normocephalic, atraumatic    EYES:  PERRLA, EOMI. Visual Acuity and Peripheral vision in tact b/l   ENT:  moist mucous membranes   NECK:  supple, symmetric   LUNGS:  Equal air entry bilaterally   CARDIOVASCULAR:  normal s1 / s2, RRR, distal pulses intact   ABDOMEN:  Soft, no rigidity   NEUROLOGIC:  Mental Status:  A & O x3,awake             Cranial Nerves:    II: Visual acuity:  normal  II: Visual fields:  normal  III: Pupils:  equal, round, reactive to light  III,IV,VI: Extra Ocular Movements: intact  V: Facial sensation:  intact  VII: Facial strength: intact  VIII: Hearing:  intact  IX: Palate:  intact  XI: Shoulder shrug:  abnormal decreased on the left side  XII: Tongue movement:  normal    Motor Exam:     MOTOR:  RUE: 5/5  LUE: 0/5  RLE: 5/5  LLE: 0/5    Sensory:    Touch:    Right Upper Extremity:  normal  Left Upper Extremity:  normal  Right Lower Extremity:  normal  Left Lower Extremity:  normal       DRAINS:  [] There are no drains for Neuro Critical Care to monitor at this time. ASSESSMENT AND PLAN:     Today as we were set to discharge the patient to an SNF, the patient stated that she went to kill herself because of the intensity of her headache. She also reports that she thought about killing herself for quite some time because she has had headaches for so long. This is not a new problem in the hospital.  However the patient did state that she would kill her self. Psychiatric team was involved in the case and they recommend the patient be admitted to the geriatric psychiatry facility. Patient be transferred to the neurology team for management and placement.     NEUROLOGIC:  - Acute right basal ganglia IPH with intraventricular extension  - Etiology likely hypertension  - CTA Head/Neck unremarkable  - Repeat CT Head 7/13 stable  - Neurosurgery signed off; F/U 6 weeks with Dr. Rosas Diaz  - Neetu Mcmullen discontinued, monitor off AED  - Headache management; Decadron 4mg Q8h (total 3 days, completed 7/16), Will repeat Flexeril 5mg again, OOB mobility. Reordered Mag oxide home dose of 250mg once daily however only 400mg available in pharmacy, substituted  - Goal SBP<160  - Neuro checks per protocol  -We will add Elavil for headache     CARDIOVASCULAR:  - Goal SBP<160  - PRN Labetalol  - Essential HTN  - Continue Losartan-HCTZ 100-25mg daily, Norvasc 10mg QD  - Added Hydralazine 25 mg in AM, 25mg in afternoon, 50mg at night  - Troponin 11, EKG sinus rhythm, left anterior fascicular block  - Echo EF 50-83%, grade I diastolic dysfunction  - Lipid panel; LDL 56, cholesterol 144 - no need for statin in setting of ICH  - Continue telemetry  - Make Excedrin as needed if needed.     PULMONARY:  - Maintaining O2 sats on 0-2L nasal canula  - Wean O2 as tolerates  - Incentive spirometry     RENAL/FLUID/ELECTROLYTE:  - Normal renal functioning  - BUN 25/ Creatinine 0.69  - Monitor I&Os, adequate urine output  - No maintenance IVF, tolerating PO  - Replace electrolytes PRN  - Daily BMP     GI/NUTRITION:  NUTRITION:  ADULT DIET;  Regular   - Passed speech bedside swallow eval; Easy to chew, thin liquids  - Tolerating diet  - Chronic diarrhea  - Resume home Cholestyramine 4g BID, Lomotil 2.5-0.025mg QID prn  - GI prophylaxis: Pepcid while on steroids only     ID:  - Afebrile       HEME:   - H&H 13.7/42.3  - Platelets 289  - Daily CBC     ENDOCRINE:  - Continue to monitor blood glucose, goal <180  - Hemoglobin A1C 6.3  - Glucose well controlled     OTHER:  - PT/OT/ST   - PM&R following; low level  - Case management following; referral sent to [address] [place] [address] (she lives in their assisted living)  - Code Status: FULL       DVT PROPHYLAXIS:  - SCD sleeves - Thigh High   - Lovenox 40mg QD        DISPOSITION:    Charles Noble MD  Neuro Critical Care  Pager [phone]  7/21/2021     3:51 PM

## 2021-07-21 NOTE — PROGRESS NOTES
Physical Therapy  Facility/Department: [facility] NEURO  Daily Treatment Note  NAME: Sarah Rae  : 1940  MRN: [id number]    Date of Service: 2021    Discharge Recommendations:  Patient would benefit from continued therapy after discharge   PT Equipment Recommendations  Equipment Needed: No    Assessment    Pt much more alert today, occasionally tearful (when talking about family members that she hasn't seen in quite a while); poor sitting balance; able to partially  suzanne stedy, max A+2 to get there, but then only min A to maintain. C/o dizziness, feeling like she was going to \"pass out\" after standing ~3 minutes in suzanne stedy; BP WNL upon sitting at EOB. Pt is very motivated to improve, become more independent. Body structures, Functions, Activity limitations: Decreased functional mobility ; Decreased strength;Decreased safe awareness;Decreased balance;Decreased sensation;Decreased endurance;Decreased ROM; Decreased vision/visual deficit  Prognosis: Fair  Decision Making: High Complexity  PT Education: Goals;PT Role;Plan of Care;General Safety;Transfer Training;Orientation; Family Education;Equipment;Disease Specific Education;Gait Training;Functional Mobility Training; Injury Prevention  Barriers to Learning: L neglect  REQUIRES PT FOLLOW UP: Yes  Activity Tolerance  Activity Tolerance: Patient limited by fatigue;Patient limited by pain; Patient limited by endurance; Patient limited by cognitive status  Activity Tolerance: pt dizzy after partially standing in suzanne stedy x 3 minutes; upon returning to sitting BP WNL     Patient Diagnosis(es): The encounter diagnosis was Intracranial hemorrhage ([address]      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 has a past surgical history that includes Appendectomy; Cholecystectomy; hernia repair; Tonsillectomy; Tubal ligation; and Hysterectomy.     Restrictions  Restrictions/Precautions  Restrictions/Precautions: General Precautions, Fall Risk, Up as Tolerated  Required Braces or Orthoses?: No  Position Activity Restriction  Other position/activity restrictions: (P) SBP <160, Zeng cath, O2 NC  Subjective   General  Response To Previous Treatment: Patient with no complaints from previous session. (pt doesn't remember that PT worked with her yesterday)  Family / Caregiver Present: No  Subjective  Subjective: conitnues to c/o HA  Pain Screening  Patient Currently in Pain: Yes  Pain Assessment  Pain Level: 5  Patient's Stated Pain Goal: No pain  Pain Type: Chronic pain  Pain Location: Head  Pain Descriptors: Headache  Pain Frequency: Continuous  Pain Onset: On-going  Clinical Progression: Not changed  Vital Signs  Patient Currently in Pain: Yes       Orientation  Orientation  Overall Orientation Status: Impaired  Orientation Level: Oriented to place;Oriented to situation;Oriented to person;Disoriented to time (knew it was 2021, wasn't aware of month)     Objective   Bed mobility  Rolling to Left: Moderate assistance  Rolling to Right: Maximum assistance  Supine to Sit: Maximum assistance  Sit to Supine: Maximum assistance;2 Person assistance  Scooting: Maximal assistance  Transfers  Sit to Stand: Maximum Assistance;2 Person Assistance (suzanne stedy)  Stand to sit: Maximum Assistance;2 Person Assistance (from suzanne stedy)  Lateral Transfers: Dependent/Total (suzanne stedy)  Comment: pt able to  suzanne stedy; up x ~3 minutes using seat, partially standing with min A+1--L knee flexed, R knee extended  Ambulation  Ambulation?: Therapy Time   Individual Concurrent Group Co-treatment   Time In 7540         Time Out Chris Dipak [address]                 Verlee Socks, [address]

## 2021-07-21 NOTE — H&P
[address] [facility] Neurology   [address] [place] [address]    HISTORY AND PHYSICAL EXAMINATION            Date:   7/21/2021  Patient name:  Sujey Torres  Date of admission:  7/12/2021  7:15 PM  MRN:   [id number]  Account:  [de-identified]  YOB: 1940  PCP:    CHRISTIANNE Todd CNP  Room:   [address] [place] [address]  Code Status:    Full Code    Chief Complaint:     No chief complaint on file. History Obtained From:     patient, electronic medical record    History of Present Illness: The patient is a 78 y.o. Non- / non  female with history of migraine, HTN, BCC, HLD who presents with left hemiparesis and she is admitted to the hospital for the management of  Right thalamic hemorrhage. Patient is a transfer from [facility] for placement and headache management. She initially presented from [facility] on 7/12 with complaint of sudden headache followed by dysarthria and acute left hemiparesis. 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 CTA showed no vascular pathology. Prior to presentation, patient had missed at least 3 days of her antihypertensive medication. Patient was started on Cardene drip and admitted to MICU. Repeat head CT showed stable bleed. During patient's inpatient course, she continued to have 10/10 frontal headaches worse on the left, typical of her usual headaches. She received treatment with Decadron IV 4 mg q 8 hr total of 3 days and completed on 7/16, and was tried on numerous medications Flexuril 5 mg, Mag oxide 250 mg po daily, Excedrin, Benadryl, Dilaudid and,  IV Mag, Morphine, Depo medrol without permanent relief. Elavil was recently added for headaches. Patient expressed to her doctor that she wanted to kill herself because of her headaches. She was seen by psychiatry who recommended admission to inpatient geriatric psych facility. On my evaluation, patient is still suicidal with sitter by bedside. She stated that she \"wants to bang her head against the wall\" and end her life because of her headaches. When asked if anyone has visited her from her family or if there was anyone she could share her feelings with, the patient stated that her family has been up to visit recently, but she has not told them of her suicidal ideation because she does not want to hurt them. Patient then expressed that she has not ended her life because she doesn't want to hurt her family. 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  Authorizing Provider   cholestyramine (QUESTRAN) 4 GM/DOSE powder TAKE ONE SCOOP DISSOLVED IN A GLASS OF WATER TWICE A DAY AS NEEDED. 5/17/21   CHRISTIANNE Canada - CNP   NONFORMULARY Indications: Natural supplement to help with elevated BP STRICTION BP - 2 TABLETS 2 TIMES A DAY    Historical Provider, MD   Aspirin Buf,CaCarb-MgCarb-MgO, (BUFFERIN PO) Take 2 tablets by mouth 4 times daily as needed Per pt    Historical Provider, MD   Melatonin 10 MG TABS Take by mouth nightly as needed    Historical Provider, MD   Omega-3 Fatty Acids (FISH OIL PO)     Historical Provider, MD   losartan-hydroCHLOROthiazide (HYZAAR) 100-25 MG per tablet TAKE ONE TABLET DAILY. 2/12/21   CHRISTIANNE Meneses CNP   amLODIPine (NORVASC) 5 MG tablet TAKE ONE TABLET DAILY. 2/12/21   CHRISTIANNE Meneses CNP   UNABLE TO FIND CBD Oil per pt report    Historical Provider, MD   CALCIUM-MAGNESIUM-VITAMIN D PO     Historical Provider, MD   Potassium Gluconate 595 (99 K) MG TABS Take by mouth daily    Historical Provider, MD   Flaxseed, Linseed, (FLAXSEED OIL PO)     Historical Provider, MD   aspirin-acetaminophen-caffeine (Judieth Lackey) 004-380-58 MG per tablet Take 2 tablets by mouth daily as needed for Headaches    Historical Provider, MD   Magnesium Oxide 250 MG TABS Take 1 tablet by mouth daily    Historical Provider, MD   ferrous sulfate 324 (65 Fe) MG EC tablet Take 324 mg by mouth daily (with breakfast)    Historical Provider, MD        Allergies:     Clonidine derivatives, Morphine, and Sulfa antibiotics    Social History:     Tobacco:    reports that she has never smoked. She has never used smokeless tobacco.  Alcohol:      reports current alcohol use. Drug Use:  reports no history of drug use. Family History:     Family History   Problem Relation Age of Onset    Diabetes Mother     Stroke Father     Heart Disease Father        Review of Systems:     Review of Systems   Constitutional: Negative for activity change, appetite change, chills and fever. HENT: Negative for congestion. Respiratory: Negative for cough, chest tightness, shortness of breath and wheezing. Cardiovascular: Negative for chest pain and leg swelling. Gastrointestinal: Negative for abdominal distention, abdominal pain, diarrhea, nausea and vomiting. Genitourinary: Negative for dysuria and flank pain. Skin: Negative for rash. Neurological: Positive for speech difficulty, weakness and headaches. Negative for dizziness and numbness.    Psychiatric/Behavioral: Positive for self-injury, sleep disturbance and suicidal ideas. Negative for agitation, behavioral problems and confusion. Physical Exam:   BP (!) 153/68   Pulse 94   Temp 97.7 °F (36.5 °C) (Oral)   Resp 24   Ht 4' 9\" (1.448 m)   Wt 141 lb 15.6 oz (64.4 kg)   SpO2 96%   BMI 30.72 kg/m²   Temp (24hrs), Av.2 °F (36.8 °C), Min:97.7 °F (36.5 °C), Max:98.7 °F (37.1 °C)    No results for input(s): POCGLU in the last 72 hours. Intake/Output Summary (Last 24 hours) at 2021 1740  Last data filed at 2021 1300  Gross per 24 hour   Intake 900 ml   Output 250 ml   Net 650 ml         Neurologic Exam     Mental Status   Oriented to person, place, and time. Level of consciousness: alert    Cranial Nerves   Cranial nerves II through XII intact.      Motor Exam   Muscle bulk: normal (Strength 4+ in RU and RL extremities, 2/5 in LUE LLE )  Overall muscle tone: normalDysarthria present      Sensory Exam   Light touch normal.   Vibration normal.   Proprioception normal.   Pinprick normal.     Gait, Coordination, and Reflexes     Coordination   Finger to nose coordination: normal  Heel to shin coordination: normal    Tremor   Resting tremor: absent    Reflexes   Right brachioradialis: 2+  Left brachioradialis: 2+  Right biceps: 2+  Left biceps: 2+  Right triceps: 2+  Left triceps: 2+  Right patellar: 2+  Left patellar: 2+  Right achilles: 2+  Left achilles: 2+  Right : 2+  Left : 2+        Investigations:      Laboratory Testing:  Recent Results (from the past 24 hour(s))   CBC WITH AUTO DIFFERENTIAL    Collection Time: 21  5:17 AM   Result Value Ref Range    WBC 10.7 3.5 - 11.3 k/uL    RBC 4.87 3.95 - 5.11 m/uL    Hemoglobin 14.4 11.9 - 15.1 g/dL    Hematocrit 44.8 36.3 - 47.1 %    MCV 92.0 82.6 - 102.9 fL    MCH 29.6 25.2 - 33.5 pg    MCHC 32.1 28.4 - 34.8 g/dL    RDW 13.4 11.8 - 14.4 %    Platelets 151 995 - 392 k/uL    MPV 9.8 8.1 - 13.5 fL    NRBC Automated 0.0 0.0 per 100 WBC    Differential Type NOT REPORTED     WBC Morphology NOT REPORTED     RBC Morphology NOT REPORTED     Platelet Estimate NOT REPORTED     Seg Neutrophils 82 (H) 36 - 65 %    Lymphocytes 11 (L) 24 - 43 %    Monocytes 7 3 - 12 %    Eosinophils % 0 (L) 1 - 4 %    Basophils 0 0 - 2 %    Immature Granulocytes 1 (H) 0 %    Segs Absolute 8.76 (H) 1.50 - 8.10 k/uL    Absolute Lymph # 1.19 1.10 - 3.70 k/uL    Absolute Mono # 0.71 0.10 - 1.20 k/uL    Absolute Eos # <0.03 0.00 - 0.44 k/uL    Basophils Absolute <0.03 0.00 - 0.20 k/uL    Absolute Immature Granulocyte 0.06 0.00 - 0.30 k/uL   BASIC METABOLIC PANEL    Collection Time: 07/21/21  5:17 AM   Result Value Ref Range    Glucose 174 (H) 70 - 99 mg/dL    BUN 39 (H) 8 - 23 mg/dL    CREATININE 0.61 0.50 - 0.90 mg/dL    Bun/Cre Ratio NOT REPORTED 9 - 20    Calcium 8.8 8.6 - 10.4 mg/dL    Sodium 133 (L) 135 - 144 mmol/L    Potassium 3.9 3.7 - 5.3 mmol/L    Chloride 98 98 - 107 mmol/L    CO2 20 20 - 31 mmol/L    Anion Gap 15 9 - 17 mmol/L    GFR Non-African American >60 >60 mL/min    GFR African American >60 >60 mL/min    GFR Comment          GFR Staging NOT REPORTED    Culture, Blood 1    Collection Time: 07/21/21  7:50 AM    Specimen: Blood   Result Value Ref Range    Specimen Description . BLOOD     Special Requests r ac 20ml     Culture NO GROWTH 6 HOURS    Culture, Blood 1    Collection Time: 07/21/21  7:55 AM    Specimen: Blood   Result Value Ref Range    Specimen Description . BLOOD     Special Requests R HAND 20ML     Culture NO GROWTH 6 HOURS          Assessment :      Primary Problem  <principal problem not specified>    Active Hospital Problems    Diagnosis Date Noted    Intracranial hemorrhage ([address] [I62.9]     Hypertensive emergency [I16.1]     IVH (intraventricular hemorrhage) (HCC) [I61.5]     Acute left hemiparesis ([address] [G81.94]     Thalamic hemorrhage with stroke ([facility] [facility] [I61.9] 07/12/2021       Patient is a 78 y.o.  Non- / non  female  PMH of HTN, depression, migraine,  HLD who presented On 7/12 with left hemiparesis and dysarthria. She was admitted for right thalamic hemorrhage with IVH on CT wo. Last CT head on 7/13 stable bleed. Patient safe for stepdown per neuro critical care team. Currently, patient is waiting on placement to inpatient geriatric psych facility/rehab. She is complaining of uncontrolled headaches that make her suicidal. Receiving po Mg 400 mg and Elavil 10 mg. One dose of Percocet ordered for patient tonight as that has helped patient in the past. Will assess response. 1. Thalamic hemorrhage with IVH with residual left hemiparesis and dysarthria   2. Migraine   3. Depression with suicidal intent      Plan:     Patient status Admit as inpatient in the  Progressive Unit/Step down    ICH with residual left sided weakness, dysarthria- stable    - Transferred from [facility]  - CT head: right basal ganglia hemorrhage with intraventricular hemorrhage most likely representing rupture of the basal ganglia hemorrhage into the ventricular system w  - CTA unremarkable   - PT/OT/ST    Intractable Headache  - Elavil 10 mg po nightly   - Mg 400 mg po nightly   - Completed course of Decadron   - Percocet 5-325 once for headache     Essential HTN:  - Continue BP management with po meds: Norvasc 10 mg po daily, Cozaar 100 mg po daily , Hydralazine 25 mg bid, 50 mg nightly, Hydrouricil 25 mg po daily     Dispo: Awaiting placement for psych/rehab facility        Consultations:   IP CONSULT TO NEUROCRITICAL CARE  IP CONSULT TO NEUROSURGERY  IP CONSULT TO PHYSICAL MEDICINE REHAB  IP CONSULT TO PSYCHIATRY      Follow-up further recommendations after discussing the case with attending  The plan was discussed with the patient, patient's family and the medical staff.      Patient is admitted as inpatient status because of co-morbidities listed above, severity of signs and symptoms as outlined, requirement for current medical therapies and most importantly because of direct risk to patient if care not provided in a hospital setting.     Darling Gonzalez MD  7/21/2021  5:40 PM    Copy sent to CHRISTIANNE Rosario - CNP

## 2021-07-21 NOTE — CARE COORDINATION
Spoke to Renetta at [address] [place] [address], she states she will call the insurance co.  To see if a new precert has to be started as P2P was not completed on time. Awaiting return call. [phone] Received a voicemail from Renetta at [address] [place] [address] she has provided a number for an appeal as the time for P2P ran out.   Updated Critical Care  ([place])and provided numbers    Phone [phone]  Ref# [id number]

## 2021-07-21 NOTE — VIRTUAL HEALTH
(NORVASC) tablet 10 mg, 10 mg, Oral, Daily  enoxaparin (LOVENOX) injection 40 mg, 40 mg, Subcutaneous, Daily  labetalol (NORMODYNE;TRANDATE) injection 10 mg, 10 mg, Intravenous, Q1H PRN  losartan (COZAAR) tablet 100 mg, 100 mg, Oral, Daily **AND** hydroCHLOROthiazide (HYDRODIURIL) tablet 25 mg, 25 mg, Oral, Daily  sodium chloride flush 0.9 % injection 5-40 mL, 5-40 mL, Intravenous, 2 times per day  sodium chloride flush 0.9 % injection 5-40 mL, 5-40 mL, Intravenous, PRN  0.9 % sodium chloride infusion, 25 mL, Intravenous, PRN  acetaminophen (TYLENOL) tablet 650 mg, 650 mg, Oral, Q4H PRN  ondansetron (ZOFRAN-ODT) disintegrating tablet 4 mg, 4 mg, Oral, Q8H PRN **OR** ondansetron (ZOFRAN) injection 4 mg, 4 mg, Intravenous, Q6H PRN     Past Medical History:       Diagnosis Date   Anemia, unspecified    Basal cell carcinoma    Depression    Disp fx of fifth metatarsal bone of right foot with routine healing    Hyperlipidemia    Hypertension    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 with unspecified severity ([facility] [facility]    Nondisp fx of fourth metatarsal bone of right foot with routine heal    Obstructive sleep apnea    Osteoarthritis    Pain in right foot    Spinal stenosis    Surgical aftercare, neoplasm    Vitamin D deficiency       Past Surgical History:       Procedure Laterality Date   APPENDECTOMY     CHOLECYSTECTOMY     HERNIA REPAIR     HYSTERECTOMY     TONSILLECTOMY     TUBAL LIGATION        Allergies: Clonidine derivatives, Morphine, and Sulfa antibiotics      Social history  RESIDENCE:  [place]  MARITAL STATUS:     CHILDREN: 4 kids  OCCUPATION: [employer]  EDUCATION: High school    SUBSTANCE USE HISTORY: Denies any    Family Medical and Psychiatric History:   Reports her father dealt with severe depression. Denies any suicides or substance use history in family.      Problem Relation Age of Onset   Diabetes Mother    Stroke Father    Heart

## 2021-07-21 NOTE — PLAN OF CARE
Problem: Falls - Risk of:  Goal: Will remain free from falls  Description: Will remain free from falls  7/21/2021 0601 by Celia Nunez RN  Outcome: Ongoing     Pt assessed as a fall risk this shift. Remains free from falls and accidental injury at this time. Fall precautions in place, including falling star sign. Floor free from obstacles, and bed is locked and in lowest position. Adequate lighting provided. Pt encouraged to call before getting Out Of Bed for any need. Will continue to monitor needs during hourly rounding, and reinforce education on use of call light. Problem: HEMODYNAMIC STATUS  Goal: Patient has stable vital signs and fluid balance  7/21/2021 0601 by Celia Nunez RN  Outcome: Ongoing   Neuro assessment completed, fall precautions in place, aspirations precautions in place, assess for barriers in communication and mobility, interventions to assist in communication and mobility in place, encouraged to call for assistance, adaptive devices used as needed, assess emotional state and support offered, encouraged patient to communicate by available means, and support systems included in patient care.

## 2021-07-21 NOTE — PROGRESS NOTES
[facility]  Speech Language Pathology    Date: 7/21/2021  Patient Name: Christopher Poole  YOB: 1940   AGE: 78 y.o. MRN: [id number]        Patient Not Available for Speech Therapy     Due to:  [] Testing  [] Hemodialysis  [] Cancelled by RN  [] Surgery   [] Intubation/Sedation/Pain Medication  [] Medical instability  [x] Other: Pt. With other disciplines    Next scheduled treatment: 7/22/2021    Completed by: [address]  Clinician    Cosigned By: Aj Broderick S.CCC/SLP

## 2021-07-21 NOTE — PLAN OF CARE
Problem: Falls - Risk of:  Goal: Will remain free from falls  Description: Will remain free from falls  7/21/2021 1956 by Julia Wilkes RN  Outcome: Met This Shift  7/21/2021 0601 by Stephanie De La Garza RN  Outcome: Ongoing  Goal: Absence of physical injury  Description: Absence of physical injury  Outcome: Met This Shift

## 2021-07-21 NOTE — SIGNIFICANT EVENT
During bedside rounds, patient reports that she is having a severe headache. She has chronic headaches that she has dealt with for many years (since the [de-identified]). Patient indicates that she just wants to die. I provided emotional support and clarified asking if she really means that or if she's just in a lot of pain and discomfort. She states she really wants to kill herself and that this is something she has thought about before. She does not respond when asked if she has a plan for self harm. However, she asks the team prior to leaving to \"overdose (her) with Demerol\". I asked patient if she would be open to a Psychiatry consult and she is agreeable. Psych consult and Suicide precautions ordered.     CHRISTIANNE Chandra - CNP  Neuro Critical Care  07/21/21 5:19 PM

## 2021-07-21 NOTE — PLAN OF CARE
Nutrition Problem #1: Predicted inadequate energy intake  Intervention: Food and/or Nutrient Delivery: Continue Current Diet, Continue Oral Nutrition Supplement  Nutritional Goals: Meet >50% of estimated nutrient needs

## 2021-07-22 ENCOUNTER — APPOINTMENT (OUTPATIENT)
Dept: CT IMAGING | Age: 81
DRG: 065 | End: 2021-07-22
Payer: MEDICARE

## 2021-07-22 LAB
ABSOLUTE EOS #: 0.07 K/UL (ref 0–0.44)
ABSOLUTE IMMATURE GRANULOCYTE: 0.07 K/UL (ref 0–0.3)
ABSOLUTE LYMPH #: 2.1 K/UL (ref 1.1–3.7)
ABSOLUTE MONO #: 1.1 K/UL (ref 0.1–1.2)
ANION GAP SERPL CALCULATED.3IONS-SCNC: 16 MMOL/L (ref 9–17)
BASOPHILS # BLD: 0 % (ref 0–2)
BASOPHILS ABSOLUTE: <0.03 K/UL (ref 0–0.2)
BUN BLDV-MCNC: 33 MG/DL (ref 8–23)
BUN/CREAT BLD: ABNORMAL (ref 9–20)
CALCIUM SERPL-MCNC: 8.6 MG/DL (ref 8.6–10.4)
CHLORIDE BLD-SCNC: 96 MMOL/L (ref 98–107)
CO2: 21 MMOL/L (ref 20–31)
CREAT SERPL-MCNC: 0.54 MG/DL (ref 0.5–0.9)
DIFFERENTIAL TYPE: ABNORMAL
EOSINOPHILS RELATIVE PERCENT: 1 % (ref 1–4)
GFR AFRICAN AMERICAN: >60 ML/MIN
GFR NON-AFRICAN AMERICAN: >60 ML/MIN
GFR SERPL CREATININE-BSD FRML MDRD: ABNORMAL ML/MIN/{1.73_M2}
GFR SERPL CREATININE-BSD FRML MDRD: ABNORMAL ML/MIN/{1.73_M2}
GLUCOSE BLD-MCNC: 152 MG/DL (ref 70–99)
HCT VFR BLD CALC: 42.6 % (ref 36.3–47.1)
HEMOGLOBIN: 13.9 G/DL (ref 11.9–15.1)
IMMATURE GRANULOCYTES: 1 %
LYMPHOCYTES # BLD: 18 % (ref 24–43)
MCH RBC QN AUTO: 29.8 PG (ref 25.2–33.5)
MCHC RBC AUTO-ENTMCNC: 32.6 G/DL (ref 28.4–34.8)
MCV RBC AUTO: 91.4 FL (ref 82.6–102.9)
MONOCYTES # BLD: 10 % (ref 3–12)
NRBC AUTOMATED: 0 PER 100 WBC
PDW BLD-RTO: 13.2 % (ref 11.8–14.4)
PLATELET # BLD: 325 K/UL (ref 138–453)
PLATELET ESTIMATE: ABNORMAL
PMV BLD AUTO: 9.7 FL (ref 8.1–13.5)
POTASSIUM SERPL-SCNC: 3.6 MMOL/L (ref 3.7–5.3)
RBC # BLD: 4.66 M/UL (ref 3.95–5.11)
RBC # BLD: ABNORMAL 10*6/UL
SEG NEUTROPHILS: 70 % (ref 36–65)
SEGMENTED NEUTROPHILS ABSOLUTE COUNT: 8.17 K/UL (ref 1.5–8.1)
SODIUM BLD-SCNC: 133 MMOL/L (ref 135–144)
WBC # BLD: 11.5 K/UL (ref 3.5–11.3)
WBC # BLD: ABNORMAL 10*3/UL

## 2021-07-22 PROCEDURE — 6370000000 HC RX 637 (ALT 250 FOR IP): Performed by: STUDENT IN AN ORGANIZED HEALTH CARE EDUCATION/TRAINING PROGRAM

## 2021-07-22 PROCEDURE — 6360000002 HC RX W HCPCS: Performed by: STUDENT IN AN ORGANIZED HEALTH CARE EDUCATION/TRAINING PROGRAM

## 2021-07-22 PROCEDURE — 70450 CT HEAD/BRAIN W/O DYE: CPT

## 2021-07-22 PROCEDURE — 6370000000 HC RX 637 (ALT 250 FOR IP): Performed by: PSYCHIATRY & NEUROLOGY

## 2021-07-22 PROCEDURE — 94761 N-INVAS EAR/PLS OXIMETRY MLT: CPT

## 2021-07-22 PROCEDURE — 80048 BASIC METABOLIC PNL TOTAL CA: CPT

## 2021-07-22 PROCEDURE — 6370000000 HC RX 637 (ALT 250 FOR IP): Performed by: NURSE PRACTITIONER

## 2021-07-22 PROCEDURE — 2060000000 HC ICU INTERMEDIATE R&B

## 2021-07-22 PROCEDURE — 6370000000 HC RX 637 (ALT 250 FOR IP): Performed by: FAMILY MEDICINE

## 2021-07-22 PROCEDURE — 99222 1ST HOSP IP/OBS MODERATE 55: CPT | Performed by: INTERNAL MEDICINE

## 2021-07-22 PROCEDURE — 2500000003 HC RX 250 WO HCPCS: Performed by: STUDENT IN AN ORGANIZED HEALTH CARE EDUCATION/TRAINING PROGRAM

## 2021-07-22 PROCEDURE — 2580000003 HC RX 258: Performed by: PSYCHIATRY & NEUROLOGY

## 2021-07-22 PROCEDURE — 97110 THERAPEUTIC EXERCISES: CPT

## 2021-07-22 PROCEDURE — 99233 SBSQ HOSP IP/OBS HIGH 50: CPT | Performed by: PSYCHIATRY & NEUROLOGY

## 2021-07-22 PROCEDURE — 85025 COMPLETE CBC W/AUTO DIFF WBC: CPT

## 2021-07-22 PROCEDURE — 6360000002 HC RX W HCPCS: Performed by: NURSE PRACTITIONER

## 2021-07-22 PROCEDURE — 2580000003 HC RX 258: Performed by: STUDENT IN AN ORGANIZED HEALTH CARE EDUCATION/TRAINING PROGRAM

## 2021-07-22 PROCEDURE — 97530 THERAPEUTIC ACTIVITIES: CPT

## 2021-07-22 PROCEDURE — 36415 COLL VENOUS BLD VENIPUNCTURE: CPT

## 2021-07-22 PROCEDURE — 6370000000 HC RX 637 (ALT 250 FOR IP): Performed by: INTERNAL MEDICINE

## 2021-07-22 PROCEDURE — 97535 SELF CARE MNGMENT TRAINING: CPT

## 2021-07-22 PROCEDURE — 93005 ELECTROCARDIOGRAM TRACING: CPT | Performed by: STUDENT IN AN ORGANIZED HEALTH CARE EDUCATION/TRAINING PROGRAM

## 2021-07-22 RX ORDER — MAGNESIUM SULFATE IN WATER 40 MG/ML
2000 INJECTION, SOLUTION INTRAVENOUS ONCE
Status: COMPLETED | OUTPATIENT
Start: 2021-07-22 | End: 2021-07-22

## 2021-07-22 RX ORDER — MAGNESIUM SULFATE IN WATER 40 MG/ML
2000 INJECTION, SOLUTION INTRAVENOUS ONCE
Status: COMPLETED | OUTPATIENT
Start: 2021-07-23 | End: 2021-07-23

## 2021-07-22 RX ORDER — BUTALBITAL, ACETAMINOPHEN AND CAFFEINE 50; 325; 40 MG/1; MG/1; MG/1
1 TABLET ORAL EVERY 6 HOURS PRN
Status: DISCONTINUED | OUTPATIENT
Start: 2021-07-22 | End: 2021-07-26 | Stop reason: HOSPADM

## 2021-07-22 RX ORDER — OXYCODONE HYDROCHLORIDE AND ACETAMINOPHEN 5; 325 MG/1; MG/1
1 TABLET ORAL ONCE
Status: COMPLETED | OUTPATIENT
Start: 2021-07-22 | End: 2021-07-22

## 2021-07-22 RX ORDER — SPIRONOLACTONE 25 MG/1
25 TABLET ORAL DAILY
Status: DISCONTINUED | OUTPATIENT
Start: 2021-07-22 | End: 2021-07-23

## 2021-07-22 RX ORDER — DIVALPROEX SODIUM 250 MG/1
250 TABLET, DELAYED RELEASE ORAL EVERY 12 HOURS SCHEDULED
Status: DISCONTINUED | OUTPATIENT
Start: 2021-07-23 | End: 2021-07-25

## 2021-07-22 RX ADMIN — MAGNESIUM GLUCONATE 500 MG ORAL TABLET 400 MG: 500 TABLET ORAL at 09:30

## 2021-07-22 RX ADMIN — ACETAMINOPHEN 650 MG: 325 TABLET ORAL at 04:09

## 2021-07-22 RX ADMIN — SODIUM CHLORIDE, PRESERVATIVE FREE 10 ML: 5 INJECTION INTRAVENOUS at 21:51

## 2021-07-22 RX ADMIN — LOSARTAN POTASSIUM 100 MG: 50 TABLET, FILM COATED ORAL at 09:30

## 2021-07-22 RX ADMIN — CHOLESTYRAMINE 4 G: 4 POWDER, FOR SUSPENSION ORAL at 21:49

## 2021-07-22 RX ADMIN — SPIRONOLACTONE 25 MG: 25 TABLET ORAL at 17:58

## 2021-07-22 RX ADMIN — GABAPENTIN 300 MG: 300 CAPSULE ORAL at 09:30

## 2021-07-22 RX ADMIN — HYDRALAZINE HYDROCHLORIDE 25 MG: 25 TABLET ORAL at 09:29

## 2021-07-22 RX ADMIN — BUTALBITAL, ACETAMINOPHEN AND CAFFEINE 1 TABLET: 50; 325; 40 TABLET ORAL at 21:49

## 2021-07-22 RX ADMIN — MAGNESIUM SULFATE HEPTAHYDRATE 2000 MG: 40 INJECTION, SOLUTION INTRAVENOUS at 03:54

## 2021-07-22 RX ADMIN — GABAPENTIN 300 MG: 300 CAPSULE ORAL at 21:49

## 2021-07-22 RX ADMIN — SODIUM CHLORIDE, PRESERVATIVE FREE 10 ML: 5 INJECTION INTRAVENOUS at 10:32

## 2021-07-22 RX ADMIN — HYDROCHLOROTHIAZIDE 25 MG: 25 TABLET ORAL at 16:14

## 2021-07-22 RX ADMIN — AMLODIPINE BESYLATE 10 MG: 10 TABLET ORAL at 09:30

## 2021-07-22 RX ADMIN — HYDRALAZINE HYDROCHLORIDE 25 MG: 25 TABLET ORAL at 16:15

## 2021-07-22 RX ADMIN — OXYCODONE HYDROCHLORIDE AND ACETAMINOPHEN 1 TABLET: 5; 325 TABLET ORAL at 09:28

## 2021-07-22 RX ADMIN — DEXTROSE MONOHYDRATE 1000 MG: 50 INJECTION, SOLUTION INTRAVENOUS at 13:20

## 2021-07-22 RX ADMIN — ONDANSETRON 4 MG: 4 TABLET, ORALLY DISINTEGRATING ORAL at 23:10

## 2021-07-22 RX ADMIN — CHOLESTYRAMINE 4 G: 4 POWDER, FOR SUSPENSION ORAL at 09:29

## 2021-07-22 RX ADMIN — ENOXAPARIN SODIUM 40 MG: 40 INJECTION SUBCUTANEOUS at 09:29

## 2021-07-22 RX ADMIN — ACETAMINOPHEN 650 MG: 325 TABLET ORAL at 23:10

## 2021-07-22 ASSESSMENT — ENCOUNTER SYMPTOMS
COUGH: 0
ABDOMINAL DISTENTION: 0
NAUSEA: 0
ABDOMINAL PAIN: 0
CHEST TIGHTNESS: 0
SHORTNESS OF BREATH: 0
DIARRHEA: 0
WHEEZING: 0
VOMITING: 0

## 2021-07-22 ASSESSMENT — PAIN DESCRIPTION - ONSET
ONSET: ON-GOING
ONSET: ON-GOING

## 2021-07-22 ASSESSMENT — PAIN DESCRIPTION - DESCRIPTORS
DESCRIPTORS: HEADACHE
DESCRIPTORS: HEADACHE

## 2021-07-22 ASSESSMENT — PAIN DESCRIPTION - LOCATION
LOCATION: HEAD

## 2021-07-22 ASSESSMENT — PAIN DESCRIPTION - PAIN TYPE
TYPE: CHRONIC PAIN
TYPE: CHRONIC PAIN
TYPE: CHRONIC PAIN;ACUTE PAIN

## 2021-07-22 ASSESSMENT — PAIN SCALES - GENERAL
PAINLEVEL_OUTOF10: 8
PAINLEVEL_OUTOF10: 5
PAINLEVEL_OUTOF10: 10
PAINLEVEL_OUTOF10: 0
PAINLEVEL_OUTOF10: 10
PAINLEVEL_OUTOF10: 10

## 2021-07-22 ASSESSMENT — PAIN DESCRIPTION - FREQUENCY
FREQUENCY: CONTINUOUS
FREQUENCY: CONTINUOUS

## 2021-07-22 NOTE — PROGRESS NOTES
Physical Therapy  Facility/Department: [facility] NEURO  Daily Treatment Note  NAME: Rogelio Sol  : 1940  MRN: [id number]    Date of Service: 2021    Discharge Recommendations:  Patient would benefit from continued therapy after discharge   PT Equipment Recommendations  Equipment Needed: No    Assessment   Body structures, Functions, Activity limitations: Decreased functional mobility ; Decreased strength;Decreased safe awareness;Decreased balance;Decreased sensation;Decreased endurance;Decreased ROM; Decreased vision/visual deficit  Assessment: Pt required modA x2 supine -> sit transfer, was able to sit EOB ~20 minutes requiring CGA-maxA for support d/t posterior and R lateral lean. Pt attempted to stand x3 in SS, but unable to acheive a full upright posture. Pt would benefit from conitnued PT to address all functional mobility deficits and improve mobility in LUE/LLE. Prognosis: Fair  PT Education: Goals;PT Role;Plan of Care;General Safety;Transfer Training;Orientation; Family Education;Equipment;Disease Specific Education;Gait Training;Functional Mobility Training; Injury Prevention  Barriers to Learning: L neglect  REQUIRES PT FOLLOW UP: Yes  Activity Tolerance  Activity Tolerance: Patient limited by fatigue;Patient limited by pain; Patient limited by endurance; Patient limited by cognitive status     Patient Diagnosis(es): The encounter diagnosis was Intracranial hemorrhage ([facility] [address] 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    has a past surgical history that includes Appendectomy; Cholecystectomy; hernia repair; Tonsillectomy; Tubal ligation; and Hysterectomy. Restrictions  Restrictions/Precautions  Restrictions/Precautions: General Precautions, Fall Risk, Up as Tolerated  Required Braces or Orthoses?: No  Position Activity Restriction  Other position/activity restrictions: SBP <160, Zeng cath, O2 NC  Subjective   General  Chart Reviewed: Yes  Response To Previous Treatment: Patient with no complaints from previous session. Family / Caregiver Present: No (Sitter present)  Subjective  Subjective: Pt and RN agreeable to PT/OT this morning. Pt supine in bed upon arrival with c/o a \"15/10\" headache. Pt cooperative throughout session, very fatigued. General Comment  Comments: Pt returned to supine in bed at end of session with call light in reach. Pain Screening  Patient Currently in Pain: Yes  Pain Assessment  Pain Assessment: 0-10  Pain Level: 10  Patient's Stated Pain Goal: No pain  Pain Type: Chronic pain;Acute pain  Pain Location: Head  Vital Signs  Patient Currently in Pain: Yes       Orientation  Orientation  Overall Orientation Status: Impaired  Orientation Level: Oriented to place;Oriented to situation;Oriented to person;Disoriented to time  Objective   Bed mobility  Supine to Sit: Moderate assistance;2 Person assistance  Sit to Supine: Maximum assistance;2 Person assistance  Comment: HOB elevated, attempted to utilized bed rails but unable. Pt required assistance with trunk and BLE throughout. Transfers  Sit to Stand: Maximum Assistance;2 Person Assistance  Stand to sit: Maximum Assistance;2 Person Assistance  Bed to Chair: Unable to assess  Stand Pivot Transfers: Unable to assess  Comment: Pt stood in SS x3 trials, but unable to acheive an upright posture. Pt required assistance to maintain L  on SS.   Ambulation  Ambulation?: No  Stairs/Curb  Stairs?: No     Balance  Posture: Poor (forward head/shoulders.)  Sitting - Static: Poor  Sitting - Dynamic: Poor  Standing - Static: Poor  Comments: Pt sat EOB ~20 minutes to perform ADLs with OT, requiring CGA-MaxA for support d/t posterior and R lateral lean. Pt attempted to stand x3 in SS but unable to acheive a full upright standing posture. Exercises  Straight Leg Raise: Supine x10 BLE  Heelslides: Supine x10  Hip Abduction: Supine x10  Knee Short Arc Quad: Supine x10  Ankle Pumps: Supine x15  Upper Extremity: Wrist flexion/extension, bicep curl, shoulder flexion, punches. x15 reps LUE PROM  Comments: Pt performed RUE/RLE exercises AROM, All LUE/LLE exericses performed PROM d/t weakness.         Goals  Short term goals  Time Frame for Short term goals: 12 visits  Short term goal 1: Perform bed mob Ariella+1  Short term goal 2: Perform transfers modA+1  Short term goal 3: Ambulate 22' modA+2 w/ appropriate device  Short term goal 4: Dangle EOB ~15 min SBA  Short term goal 5: Facilitate active movement LUE/LLE  Patient Goals   Patient goals : Go home    Plan    Plan  Times per week: 5-6 visits weekly  Times per day: Daily  Current Treatment Recommendations: Strengthening, ROM, Balance Training, Functional Mobility Training, Gait Training, Endurance Training, Transfer Training, Safety Education & Training, Wheelchair Mobility Training, Neuromuscular Re-education, Cognitive Reorientation, Pain Management, Home Exercise Program, Patient/Caregiver Education & Training, Positioning  Safety Devices  Type of devices: Bed alarm in place, Call light within reach, Gait belt, Patient at risk for falls, Left in bed, Nurse notified, Sitter present  Restraints  Initially in place: No     Therapy Time   Individual Concurrent Group Co-treatment   Time In 0949         Time Out 1031         Minutes 42         Timed Code Treatment Minutes: 23 Minutes (co-treat with OT)       Mikayla Henderson, PTA

## 2021-07-22 NOTE — PROGRESS NOTES
[facility] Neurology   [address] [place] [address]     PROGRESS NOTE   Date:                            7/22/2021  Patient name:              Christopher Poole  Date of admission:      7/12/2021  7:15 PM  MRN:                           [id number]  Account:                      [id number]  YOB: 1940  PCP:                            CHRISTIANNE Martinez CNP  Room:                          [address] [place] [address]  Code Status:               Full Code     Subjective:      Patient seen and examined at the bedside. Patient had Percocet last night, continued to have severe headaches, was given 2 mg IV Magnesium last night without relief. Working on placement.        History of Present Illness:      The patient is a 78 y.o. Non- / non  female with history of migraine, HTN, BCC, HLD who presents with left hemiparesis and she is admitted to the hospital for the management of  Right thalamic hemorrhage. Patient is a transfer from [facility] for placement and headache management. She initially presented from [facility] on 7/12 with complaint of sudden headache followed by dysarthria and acute left hemiparesis. 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 CTA showed no vascular pathology. Prior to presentation, patient had missed at least 3 days of her antihypertensive medication.      Patient was started on Cardene drip and admitted to MICU. Repeat head CT showed stable bleed. During patient's inpatient course, she continued to have 10/10 frontal headaches worse on the left, typical of her usual headaches. She received treatment with Decadron IV 4 mg q 8 hr total of 3 days and completed on 7/16, and was tried on numerous medications Flexuril 5 mg, Mag oxide 250 mg po daily, Excedrin, Benadryl, Dilaudid and,  IV Mag, Morphine, Depo medrol without permanent relief. Elavil was recently added for headaches.    Patient expressed to her doctor that she wanted to kill herself because of her headaches. She was seen by psychiatry who recommended admission to inpatient geriatric psych facility.      On my evaluation, patient is still suicidal with sitter by bedside. She stated that she \"wants to bang her head against the wall\" and end her life because of her headaches. When asked if anyone has visited her from her family or if there was anyone she could share her feelings with, the patient stated that her family has been up to visit recently, but she has not told them of her suicidal ideation because she does not want to hurt them. Patient then expressed that she has not ended her life because she doesn't want to hurt her family.      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 with unspecified severity (HCC)      Nondisp fx of fourth metatarsal bone of right foot with routine heal      Obstructive sleep apnea      Osteoarthritis      Pain in right foot      Spinal stenosis      Surgical aftercare, neoplasm      Vitamin D deficiency              Past Surgical History:      Past Surgical History         Past Surgical History:   Procedure Laterality Date    APPENDECTOMY        CHOLECYSTECTOMY        HERNIA REPAIR        HYSTERECTOMY        TONSILLECTOMY        TUBAL LIGATION                Medications Prior to Admission:      Home Medications           Prior to Admission medications    Medication Sig Start Date End Date Taking?  Authorizing Provider   cholestyramine (QUESTRAN) 4 GM/DOSE powder TAKE ONE SCOOP DISSOLVED IN A GLASS OF WATER TWICE A DAY AS NEEDED. 5/17/21     CHRISTIANNE Easley CNP   NONFORMULARY Indications: Natural supplement to help with elevated BP STRICTION BP - 2 TABLETS 2 TIMES A DAY       Historical Provider, MD   Aspirin Buf,CaCarb-MgCarb-MgO, (BUFFERIN PO) Take 2 tablets by mouth 4 times daily as needed Per pt       Historical Provider, MD   Melatonin 10 MG TABS Take by mouth nightly as needed       Historical Provider, MD   Omega-3 Fatty Acids (FISH OIL PO)         Historical Provider, MD   losartan-hydroCHLOROthiazide (HYZAAR) 100-25 MG per tablet TAKE ONE TABLET DAILY. 2/12/21     CHRISTIANNE Orr - CNP   amLODIPine (NORVASC) 5 MG tablet TAKE ONE TABLET DAILY. 2/12/21     Shani Jayashree CHRISTIANNE Caicedo - CNP   UNABLE TO FIND CBD Oil per pt report       Historical Provider, MD   CALCIUM-MAGNESIUM-VITAMIN D PO         Historical Provider, MD   Potassium Gluconate 595 (99 K) MG TABS Take by mouth daily       Historical Provider, MD   Flaxseed, Linseed, (FLAXSEED OIL PO)         Historical Provider, MD   aspirin-acetaminophen-caffeine (EXCEDRIN MIGRAINE) 611-696-65 MG per tablet Take 2 tablets by mouth daily as needed for Headaches       Historical Provider, MD   Magnesium Oxide 250 MG TABS Take 1 tablet by mouth daily       Historical Provider, MD   ferrous sulfate 324 (65 Fe) MG EC tablet Take 324 mg by mouth daily (with breakfast)       Historical Provider, MD            Allergies:      Clonidine derivatives, Morphine, and Sulfa antibiotics     Social History:      Tobacco:    reports that she has never smoked. She has never used smokeless tobacco.  Alcohol:      reports current alcohol use. Drug Use:  reports no history of drug use.     Family History:      Family History         Family History   Problem Relation Age of Onset    Diabetes Mother      Stroke Father      Heart Disease Father              Review of Systems:      Review of Systems   Constitutional: Negative for activity change, appetite change, chills and fever. HENT: Negative for congestion.     Respiratory: Negative for cough, chest tightness, shortness of breath and wheezing. Cardiovascular: Negative for chest pain and leg swelling. Gastrointestinal: Negative for abdominal distention, abdominal pain, diarrhea, nausea and vomiting. Genitourinary: Negative for dysuria and flank pain. Skin: Negative for rash. Neurological: Positive for speech difficulty, weakness and headaches. Negative for dizziness and numbness. Psychiatric/Behavioral: Positive for self-injury, sleep disturbance and suicidal ideas. Negative for agitation, behavioral problems and confusion.            Physical Exam:   BP (!) 171/89   Pulse 92   Temp 97.4 °F (36.3 °C) (Oral)   Resp 20   Ht 4' 9\" (1.448 m)   Wt 141 lb 15.6 oz (64.4 kg)   SpO2 94%   BMI 30.72 kg/m²   Temp (24hrs), Av.9 °F (36.6 °C), Min:97.4 °F (36.3 °C), Max:98.6 °F (37 °C)     No results for input(s): POCGLU in the last 72 hours.     Intake/Output Summary (Last 24 hours) at 2021 0840  Last data filed at 2021 0600      Gross per 24 hour   Intake 710 ml   Output 1250 ml   Net -540 ml            Neurologic Exam      Mental Status   Oriented to person, place, and time.    Level of consciousness: alert     Cranial Nerves   Cranial nerves II through XII intact.      Motor Exam   Muscle bulk: normal (Strength 4+ in RU and RL extremities, 2/5 in LUE LLE )  Overall muscle tone: normalDysarthria present       Sensory Exam   Light touch normal.   Vibration normal.   Proprioception normal.   Pinprick normal.      Gait, Coordination, and Reflexes      Coordination   Finger to nose coordination: normal  Heel to shin coordination: normal     Tremor   Resting tremor: absent     Reflexes   Right brachioradialis: 2+  Left brachioradialis: 2+  Right biceps: 2+  Left biceps: 2+  Right triceps: 2+  Left triceps: 2+  Right patellar: 2+  Left patellar: 2+  Right achilles: 2+  Left achilles: 2+  Right : 2+  Left : 2+           Investigations:       Laboratory Testing:  Recent Results         Recent Results (from the past 24 hour(s))   CBC WITH AUTO DIFFERENTIAL     Collection Time: 07/22/21  6:52 AM   Result Value Ref Range     WBC 11.5 (H) 3.5 - 11.3 k/uL     RBC 4.66 3.95 - 5.11 m/uL     Hemoglobin 13.9 11.9 - 15.1 g/dL     Hematocrit 42.6 36.3 - 47.1 %     MCV 91.4 82.6 - 102.9 fL     MCH 29.8 25.2 - 33.5 pg     MCHC 32.6 28.4 - 34.8 g/dL     RDW 13.2 11.8 - 14.4 %     Platelets 926 869 - 544 k/uL     MPV 9.7 8.1 - 13.5 fL     NRBC Automated 0.0 0.0 per 100 WBC     Differential Type NOT REPORTED       Seg Neutrophils 70 (H) 36 - 65 %     Lymphocytes 18 (L) 24 - 43 %     Monocytes 10 3 - 12 %     Eosinophils % 1 1 - 4 %     Basophils 0 0 - 2 %     Immature Granulocytes 1 (H) 0 %     Segs Absolute 8.17 (H) 1.50 - 8.10 k/uL     Absolute Lymph # 2.10 1.10 - 3.70 k/uL     Absolute Mono # 1.10 0.10 - 1.20 k/uL     Absolute Eos # 0.07 0.00 - 0.44 k/uL     Basophils Absolute <0.03 0.00 - 0.20 k/uL     Absolute Immature Granulocyte 0.07 0.00 - 0.30 k/uL     WBC Morphology NOT REPORTED       RBC Morphology NOT REPORTED       Platelet Estimate NOT REPORTED     BASIC METABOLIC PANEL     Collection Time: 07/22/21  6:52 AM   Result Value Ref Range     Glucose 152 (H) 70 - 99 mg/dL     BUN 33 (H) 8 - 23 mg/dL     CREATININE 0.54 0.50 - 0.90 mg/dL     Bun/Cre Ratio NOT REPORTED 9 - 20     Calcium 8.6 8.6 - 10.4 mg/dL     Sodium 133 (L) 135 - 144 mmol/L     Potassium 3.6 (L) 3.7 - 5.3 mmol/L     Chloride 96 (L) 98 - 107 mmol/L     CO2 21 20 - 31 mmol/L     Anion Gap 16 9 - 17 mmol/L     GFR Non-African American >60 >60 mL/min     GFR African American >60 >60 mL/min     GFR Comment            GFR Staging NOT REPORTED                 Assessment :       Primary Problem  <principal problem not specified>          Active Hospital Problems     Diagnosis Date Noted    Intracranial hemorrhage (HCC) [I62.9]      Hypertensive emergency [I16.1]      IVH (intraventricular hemorrhage) (HCC) [I61.5]      Acute left hemiparesis (HCC) [G81.94]      Thalamic hemorrhage with stroke [facility]) [I61.9] 07/12/2021         Patient is a 78 y.o. Non- / non  female  PMH of HTN, depression, migraine,  HLD who presented  On 7/12 with left hemiparesis and dysarthria. She was admitted for right thalamic hemorrhage with IVH on CT wo. Last CT head on 7/13 stable bleed. Patient safe for stepdown per neuro critical care team. Currently, patient is waiting on placement to inpatient geriatric psych facility/rehab. She is complaining of uncontrolled headaches that make her suicidal. Receiving po Mg 400 mg and Elavil 10 mg. Received 2 g IV Magnesium overnight, still complaining of headache. Will give percocet and get EKG to check QTc.      1. Thalamic hemorrhage with IVH with residual left hemiparesis and dysarthria   2. Intractable Migraine   3.  Depression with suicidal intent       Plan:      Patient status Admit as inpatient in the  Progressive Unit/Step down     ICH with residual left sided weakness, dysarthria- stable    - Transferred from [facility]  - CT head: right basal ganglia hemorrhage with intraventricular hemorrhage most likely representing rupture of the basal ganglia hemorrhage into the ventricular system w  - CTA unremarkable   - PT/OT/ST     Intractable Headache  - Elavil 10 mg po nightly   - Mg 400 mg po nightly   - Completed course of Decadron   - Percocet 5-325 once for headache   - 2 g IV Magnesium completed  - Will get repeat EKG to check QTc before proceeding with migraine cocktail      Essential HTN:  - Continue BP management with po meds: Norvasc 10 mg po daily, Cozaar 100 mg po daily , Hydralazine 25 mg bid, 50 mg nightly, Hydrouricil 25 mg po daily      Dispo: Awaiting placement for psych/rehab facility           Consultations:   IP CONSULT TO NEUROCRITICAL CARE  IP CONSULT TO NEUROSURGERY  IP CONSULT TO PHYSICAL MEDICINE REHAB  IP CONSULT TO PSYCHIATRY        Follow-up further recommendations after discussing the case with attending  The plan was discussed with the patient, patient's family and the medical staff.      Patient is admitted as inpatient status because of co-morbidities listed above, severity of signs and symptoms as outlined, requirement for current medical therapies and most importantly because of direct risk to patient if care not provided in a hospital setting.  Jad Swann MD  7/22/2021  8:40 AM     Copy sent to Dr. Donavan Burnett, APRN - CNP

## 2021-07-22 NOTE — PROGRESS NOTES
Occupational Therapy  Facility/Department: [facility] NEURO  Daily Treatment Note  NAME: Rogelio Sol  : 1940  MRN: [id number]    Date of Service: 2021    Discharge Recommendations: Further therapy recommended at discharge. The patient should be able to tolerate at least three hours of therapy per day over 5 days or 15 hours over 7 days. Patient would benefit from continued therapy after discharge  OT Equipment Recommendations  ADL Assistive Devices: Shower Chair with back    Assessment   Performance deficits / Impairments: Decreased functional mobility ; Decreased endurance;Decreased coordination;Decreased ADL status; Decreased sensation;Decreased posture;Decreased ROM; Decreased balance;Decreased strength;Decreased vision/visual deficit; Decreased safe awareness;Decreased high-level IADLs;Decreased fine motor control;Decreased cognition  Assessment: Pt will continue to require acute care and post acute care OT services to increase functional transfers/mobility, ADL's and IADL's prior to returning to previous living arrangements. Treatment Diagnosis: CVA  Prognosis: Good  OT Education: OT Role;Transfer Training;Precautions  Patient Education: purpose of OT; importance of activity; balance maintaince; proper posture; proper hand and foot placement  Barriers to Learning: pt demo P carry over  REQUIRES OT FOLLOW UP: Yes  Activity Tolerance  Activity Tolerance: Patient limited by pain;Treatment limited secondary to decreased cognition;Patient limited by fatigue  Activity Tolerance: Headache  Safety Devices  Safety Devices in place: Yes  Type of devices: Bed alarm in place;Call light within reach;Gait belt;Patient at risk for falls; Left in bed;Nurse notified  Restraints  Initially in place: No  Restraints: Seizure precautions         Patient Diagnosis(es): The encounter diagnosis was Intracranial hemorrhage ([facility] [address]       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 has a past surgical history that includes Appendectomy; Cholecystectomy; hernia repair; Tonsillectomy; Tubal ligation; and Hysterectomy. Restrictions  Restrictions/Precautions  Restrictions/Precautions: General Precautions, Fall Risk, Up as Tolerated  Required Braces or Orthoses?: No  Position Activity Restriction  Other position/activity restrictions: SBP <160, Zeng cath, O2 NC  Subjective   General  Chart Reviewed: Yes  Patient assessed for rehabilitation services?: Yes  Family / Caregiver Present: No  General Comment  Comments: pt and RN agreeable to therapy      Orientation  Orientation  Overall Orientation Status: Within Functional Limits  Orientation Level: Oriented to person;Disoriented to place; Disoriented to time;Disoriented to situation  Objective    ADL  Feeding: Stand by assistance;Setup; Increased time to complete;Pureed diet; Thickened liquids; Minimal assistance (assist to open containers, cut food, able to feed self)  Grooming: Stand by assistance;Minimal assistance;Setup;Verbal cueing; Increased time to complete (SBA-wash face, comb hair, Min-put toothpaste onto toothbrush, assist w/holding emesis basin while rinsing while seated EOB)  UE Bathing: Minimal assistance (writer washed pts back d/t \"it was itching\" per pt, pt declined to wash entire body)  UE Dressing: Maximum assistance;Setup;Verbal cueing; Increased time to complete (assist to snap, thread arms and tie gown)  LE Dressing: Maximum assistance (assist w/donning of footies)  Toileting: Unable to assess (Zeng cath)      Pt in bed upon arrival. C/O HA, pain meds on board. Ok'd for OT tx. Sup to sit to EOB with mod x2 and HOB slightly elevateed. Pt setup for groomig at tray table. Pt needed assist to put toothpaste onto toothbrush and open mouth wash. Writer sitting next to pt to hold emesis basin. Attempted standing x3 w/SS and pt was unable to clear bottom off of bed. Pt returned to bed, very fatigued. Call light and phone in reach. Sitter in room, bed alarm activated. RN notified. PT in room at end of tx. Balance  Sitting Balance: Maximum-Moderate assistance sitting at EOG (needed vc's to correct posture while seated at EOB)  Standing Balance: Maximum assistance x3 (unable to fully stand w/use of SS, Cleared bottom off of bed x2)  Standing Balance  Time: Attempted standing x3 in SS and pt was unable to fully stand  Comment: Pt unable to fully stand x2, 3rd attempt, pt was unable to clear bottom off of EOB  Bed mobility  Rolling to Left: Moderate assistance  Rolling to Right: Maximum assistance  Supine to Sit: Moderate assistance;2 Person assistance  Sit to Supine: Maximum assistance;2 Person assistance  Scooting: Maximal assistance  Comment: HOB elevated and use of bed rail, assist needed for trunk and BLE progression. Transfers  Sit to stand: 2 Person assistance;Maximum assistance  Stand to sit: 2 Person assistance;Maximum assistance  Transfer Comments: w/SS attempted 3 times and pt was not able to fully stand thid date. Cognition  Overall Cognitive Status: Exceptions  Arousal/Alertness: Delayed responses to stimuli;Inconsistent responses to stimuli  Following Commands: Follows one step commands with repetition; Follows one step commands with increased time  Attention Span: Difficulty attending to directions; Difficulty dividing attention  Memory: Decreased recall of recent events  Safety Judgement: Decreased awareness of need for assistance;Decreased awareness of need for safety  Problem Solving: Decreased awareness of errors;Assistance required to identify errors made;Assistance required to correct errors made;Assistance required to implement solutions;Assistance required to generate solutions  Insights: Decreased awareness of deficits  Initiation: Requires cues for all  Sequencing: Requires cues for all  Cognition Comment: Pt very lethargic with difficulty keeping eyes open.  L sided neglect noted, requiring increased time and cueing to attend to items on L side      Plan   Plan  Times per week: 4-5x/wk  Current Treatment Recommendations: Strengthening, Patient/Caregiver Education & Training, Home Management Training, ROM, Equipment Evaluation, Education, & procurement, Balance Training, Neuromuscular Re-education, Functional Mobility Training, Positioning, Endurance Training, Cognitive/Perceptual Training, Safety Education & Training, Self-Care / ADL     AM-PAC Score     AM-PAC Inpatient Daily Activity Raw Score: 14 (07/22/21 1311)  AM-PAC Inpatient ADL T-Scale Score : 33.39 (07/22/21 1311)  ADL Inpatient CMS 0-100% Score: 59.67 (07/22/21 1311)  ADL Inpatient CMS G-Code Modifier : CK (07/22/21 1311)    Goals  Short term goals  Time Frame for Short term goals: Patient will, by discharge:  Short term goal 1: demo UB ADLs at min A with <3 VC  Short term goal 2: demo LB ADLs at mod A with <3 VC  Short term goal 3: locate supplies on L side with <2 VC to visually scan to increase participation in ADL tasks  Short term goal 4: demo safety awareness with <3 VC to increase participation in functional tasks  Short term goal 5: incorporate LUE during ADL tasks with mod A to increase functional ROM and strength  Short term goal 6: demo functional transfers/mobility at mod A to increase participation in functional tasks  Short term goal 7: demo 10+ minutes of proprioceptive input to the L extremities to facilitate neuromuscular re-education to improve functional use     Therapy Time   Individual Concurrent Group Co-treatment   Time In  0950         Time Out 1030         Minutes 23 total tx time      17 total co-tx time           MERLINE ECHEVARRIA, JONES/L

## 2021-07-22 NOTE — PLAN OF CARE
Problem: Falls - Risk of:  Goal: Will remain free from falls  Description: Will remain free from falls  7/22/2021 1255 by Fadia Wagner RN  Outcome: Met This Shift  7/22/2021 0539 by Tyshawn Lujan RN  Outcome: Ongoing  Goal: Absence of physical injury  Description: Absence of physical injury  7/22/2021 1255 by Fadia Wagner RN  Outcome: Met This Shift  7/22/2021 0539 by Tyshawn Lujan RN  Outcome: Ongoing     Problem: HEMODYNAMIC STATUS  Goal: Patient has stable vital signs and fluid balance  7/22/2021 1255 by Fadia Wagner RN  Outcome: Met This Shift  7/22/2021 0539 by Tyshawn Lujan RN  Outcome: Ongoing     Problem: ACTIVITY INTOLERANCE/IMPAIRED MOBILITY  Goal: Mobility/activity is maintained at optimum level for patient  7/22/2021 1255 by Fadia Wagner RN  Outcome: Met This Shift  7/22/2021 0539 by Tyhsawn Lujan RN  Outcome: Ongoing     Problem: Safety:  Goal: Ability to remain free from injury will improve  Description: Ability to remain free from injury will improve  7/22/2021 1255 by Fadia Wagner RN  Outcome: Met This Shift  7/22/2021 0539 by Tyshawn Lujan RN  Outcome: Ongoing     Problem: COMMUNICATION IMPAIRMENT  Goal: Ability to express needs and understand communication  7/22/2021 1255 by Fadia Wagner RN  Outcome: Ongoing  7/22/2021 0539 by Tyshawn Lujan RN  Outcome: Ongoing     Problem: Coping:  Goal: Ability to cope will improve  Description: Ability to cope will improve  7/22/2021 1255 by Fadia Wagner RN  Outcome: Ongoing  7/22/2021 0539 by Tyshawn Lujan RN  Outcome: Ongoing  Goal: Ability to identify appropriate support needs will improve  Description: Ability to identify appropriate support needs will improve  7/22/2021 1255 by Fadia Wagner RN  Outcome: Ongoing  7/22/2021 0539 by Tyshawn Lujan RN  Outcome: Ongoing     Problem: Health Behavior:  Goal: Ability to manage health-related needs will improve  Description: Ability to manage health-related needs will improve  7/22/2021 0539 by Tyshawn Lujan RN  Outcome: Ongoing     Problem: Physical Regulation:  Goal: Signs of adequate cerebral perfusion will increase  Description: Signs of adequate cerebral perfusion will increase  7/22/2021 1255 by Keyon Crouch RN  Outcome: Ongoing  7/22/2021 0539 by Mel Oseguera RN  Outcome: Ongoing  Goal: Ability to maintain a stable neurologic state will improve  Description: Ability to maintain a stable neurologic state will improve  7/22/2021 1255 by Keyon Crouch RN  Outcome: Ongoing  7/22/2021 0539 by Mel Oseguera RN  Outcome: Ongoing     Problem: Self-Concept:  Goal: Level of anxiety will decrease  Description: Level of anxiety will decrease  7/22/2021 1255 by Keyon Crouch RN  Outcome: Ongoing  7/22/2021 0539 by Mel Oseguera RN  Outcome: Ongoing  Goal: Ability to verbalize feelings about condition will improve  Description: Ability to verbalize feelings about condition will improve  7/22/2021 1255 by Keyon Crouch RN  Outcome: Ongoing  7/22/2021 0539 by Mel Oseguera RN  Outcome: Ongoing     Problem: Pain:  Goal: Pain level will decrease  Description: Pain level will decrease  7/22/2021 0539 by Mel Oseguera RN  Outcome: Ongoing  Goal: Control of acute pain  Description: Control of acute pain  7/22/2021 0539 by Mel Oseguera RN  Outcome: Ongoing     Problem: Skin Integrity:  Goal: Will show no infection signs and symptoms  Description: Will show no infection signs and symptoms  7/22/2021 0539 by Mel Oseguera RN  Outcome: Ongoing  Goal: Absence of new skin breakdown  Description: Absence of new skin breakdown  7/22/2021 0539 by Mel Oseguera RN  Outcome: Ongoing

## 2021-07-22 NOTE — PLAN OF CARE
Problem: Falls - Risk of:  Goal: Will remain free from falls  Description: Will remain free from falls  7/22/2021 0539 by Mile Stevenson RN  Outcome: Ongoing    Goal: Absence of physical injury  Description: Absence of physical injury  7/22/2021 0539 by Mile Stevenson RN  Outcome: Ongoing    Pt assessed as a fall risk this shift. Remains free from falls and accidental injury at this time. Fall precautions in place, including falling star sign. Floor free from obstacles, and bed is locked and in lowest position. Adequate lighting provided. Pt encouraged to call before getting Out Of Bed for any need. Will continue to monitor needs during hourly rounding, and reinforce education on use of call light.      Problem: HEMODYNAMIC STATUS  Goal: Patient has stable vital signs and fluid balance  Outcome: Ongoing     Problem: ACTIVITY INTOLERANCE/IMPAIRED MOBILITY  Goal: Mobility/activity is maintained at optimum level for patient  Outcome: Ongoing     Problem: COMMUNICATION IMPAIRMENT  Goal: Ability to express needs and understand communication  Outcome: Ongoing     Problem: Coping:  Goal: Ability to cope will improve  Description: Ability to cope will improve  Outcome: Ongoing  Goal: Ability to identify appropriate support needs will improve  Description: Ability to identify appropriate support needs will improve  Outcome: Ongoing     Problem: Health Behavior:  Goal: Ability to manage health-related needs will improve  Description: Ability to manage health-related needs will improve  Outcome: Ongoing     Problem: Physical Regulation:  Goal: Signs of adequate cerebral perfusion will increase  Description: Signs of adequate cerebral perfusion will increase  Outcome: Ongoing  Goal: Ability to maintain a stable neurologic state will improve  Description: Ability to maintain a stable neurologic state will improve  Outcome: Ongoing     Problem: Safety:  Goal: Ability to remain free from injury will improve  Description: Ability to remain free from injury will improve  Outcome: Ongoing     Problem: Self-Concept:  Goal: Level of anxiety will decrease  Description: Level of anxiety will decrease  Outcome: Ongoing  Goal: Ability to verbalize feelings about condition will improve  Description: Ability to verbalize feelings about condition will improve  Outcome: Ongoing    Neuro assessment completed, fall precautions in place, aspirations precautions in place, assess for barriers in communication and mobility, interventions to assist in communication and mobility in place, encouraged to call for assistance, adaptive devices used as needed, assess emotional state and support offered, encouraged patient to communicate by available means, and support systems included in patient care. Problem: Pain:  Goal: Pain level will decrease  Description: Pain level will decrease  Outcome: Ongoing  Goal: Control of acute pain  Description: Control of acute pain  Outcome: Ongoing    Pain level assessment complete. Pt rated pain at 8/10. Pt educated on pain scale and control interventions. PRN pain medication given per pt request. Pt instructed to call out with new onset of pain or unrelieved pain. Will continue to monitor.        Problem: Skin Integrity:  Goal: Will show no infection signs and symptoms  Description: Will show no infection signs and symptoms  Outcome: Ongoing  Goal: Absence of new skin breakdown  Description: Absence of new skin breakdown  Outcome: Ongoing

## 2021-07-22 NOTE — CONSULTS
today.  Medical history significant for obstructive sleep apnea, hypertension, depression, hyperlipidemia, vitamin D deficiency. 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 Authorizing Provider   cholestyramine (QUESTRAN) 4 GM/DOSE powder TAKE ONE SCOOP DISSOLVED IN A GLASS OF WATER TWICE A DAY AS NEEDED. 5/17/21   CHRISTIANNE Santoro CNP   NONFORMULARY Indications: Natural supplement to help with elevated BP STRICTION BP - 2 TABLETS 2 TIMES A DAY    Historical Provider, MD   Aspirin Buf,CaCarb-MgCarb-MgO, (BUFFERIN PO) Take 2 tablets by mouth 4 times daily as needed Per pt    Historical Provider, MD   Melatonin 10 MG TABS Take by mouth nightly as needed    Historical Provider, MD   Omega-3 Fatty Acids (FISH OIL PO)     Historical Provider, MD   losartan-hydroCHLOROthiazide (HYZAAR) 100-25 MG per tablet TAKE ONE TABLET DAILY. 2/12/21   CHRISTIANNE Santoro CNP   amLODIPine (NORVASC) 5 MG tablet TAKE ONE TABLET DAILY.  2/12/21   CHRISTIANNE Santoro CNP   UNABLE TO FIND CBD Oil per pt report    Historical Provider, MD   CALCIUM-MAGNESIUM-VITAMIN D PO     Historical Provider, MD   Potassium Gluconate 595 (99 K) MG TABS Take by mouth daily    Historical Provider, MD   Flaxseed, Linseed, (FLAXSEED OIL PO)     Historical Provider, MD   aspirin-acetaminophen-caffeine (EXCEDRIN MIGRAINE) 227-575-65 MG per tablet Take 2 tablets by mouth daily as needed for Headaches    Historical Provider, MD   Magnesium Oxide 250 MG TABS Take 1 tablet by mouth daily    Historical Provider, MD   ferrous sulfate 324 (65 Fe) MG EC tablet Take 324 mg by mouth daily (with breakfast)    Historical Provider, MD        Allergies:     Clonidine derivatives, Morphine, and Sulfa antibiotics    Social History:     Tobacco:    reports that she has never smoked. She has never used smokeless tobacco.  Alcohol:      reports current alcohol use. Drug Use:  reports no history of drug use. Family History:     Family History   Problem Relation Age of Onset    Diabetes Mother     Stroke Father     Heart Disease Father        Review of Systems:     Positive and Negative as described in HPI. CONSTITUTIONAL:  negative for fevers, chills, sweats, admits to fatigue, denies weight loss  HEENT:  negative for vision, hearing changes, runny nose, throat pain  RESPIRATORY:  negative for shortness of breath, cough, congestion, wheezing. CARDIOVASCULAR:  negative for chest pain, palpitations. GASTROINTESTINAL:  negative for nausea, vomiting, diarrhea, constipation, change in bowel habits, abdominal pain   GENITOURINARY:  negative for difficulty of urination, burning with urination, frequency   INTEGUMENT:  negative for rash, skin lesions, easy bruising   HEMATOLOGIC/LYMPHATIC:  negative for swelling/edema   ALLERGIC/IMMUNOLOGIC:  negative for urticaria , itching  ENDOCRINE:  negative increase in drinking, increase in urination, hot or cold intolerance  MUSCULOSKELETAL:  negative joint pains, muscle aches, swelling of joints  NEUROLOGICAL:  Admits to headaches, and left-sided weakness.   Denies any numbness, tingling  BEHAVIOR/PSYCH:  negative for depression, anxiety    Physical Exam:     /76   Pulse 88   Temp 97.4 °F (36.3 °C) (Oral)   Resp 15   Ht 4' 9\" (1.448 m)   Wt 141 lb 15.6 oz (64.4 kg)   SpO2 93%   BMI 30.72 kg/m²   Temp (24hrs), Av.6 °F (36.4 °C), Min:97.4 °F (36.3 °C), Max:97.9 °F (36.6 °C)    No results for input(s): POCGLU in the last 72 hours. Intake/Output Summary (Last 24 hours) at 2021 1506  Last data filed at 2021 1041  Gross per 24 hour   Intake 10 ml   Output 1500 ml   Net -1490 ml       General Appearance:  alert, chronically ill appearing, and in no acute distress  Mental status: oriented to person, place, and time with normal affect  Head:  normocephalic, atraumatic. Eye: no icterus, redness, pupils equal and reactive, extraocular eye movements intact, conjunctiva clear  Ear: normal external ear, no discharge, hearing intact  Nose:  no drainage noted  Mouth: mucous membranes moist  Neck: supple, no carotid bruits, thyroid not palpable  Lungs: Bilateral equal air entry, clear to ausculation, no wheezing, rales or rhonchi, normal effort  Cardiovascular: normal rate, regular rhythm, no murmur, gallop, rub. Abdomen: Soft, nontender, nondistended, normal bowel sounds, no hepatomegaly or splenomegaly  Neurologic: Alert and oriented x3, normal sensation with left-sided weakness. Leg left 2 out of 5 strength. Absent visual threat. Sided hemiparesis.   Admits to headache  Skin: No gross lesions, rashes, bruising or bleeding on exposed skin area  Extremities:  peripheral pulses palpable, no pedal edema or calf pain with palpation  Psych: normal affect    Investigations:      Laboratory Testing:  Recent Results (from the past 24 hour(s))   CBC WITH AUTO DIFFERENTIAL    Collection Time: 21  6:52 AM   Result Value Ref Range    WBC 11.5 (H) 3.5 - 11.3 k/uL    RBC 4.66 3.95 - 5.11 m/uL    Hemoglobin 13.9 11.9 - 15.1 g/dL    Hematocrit 42.6 36.3 - 47.1 %    MCV 91.4 82.6 - 102.9 fL    MCH 29.8 25.2 - 33.5 pg    MCHC 32.6 28.4 - 34.8 g/dL    RDW 13.2 11.8 - 14.4 %    Platelets 793 652 - 138 k/uL    MPV 9.7 8.1 - 13.5 fL    NRBC Automated 0.0 0.0 per 100 WBC    Differential Type NOT REPORTED     Seg Neutrophils 70 (H) 36 - 65 %    Lymphocytes 18 (L) 24 - 43 %    Monocytes 10 3 - 12 %    Eosinophils % 1 1 - 4 %    Basophils 0 0 - 2 %    Immature Granulocytes 1 (H) 0 %    Segs Absolute 8.17 (H) 1.50 - 8.10 k/uL    Absolute Lymph # 2.10 1.10 - 3.70 k/uL    Absolute Mono # 1.10 0.10 - 1.20 k/uL    Absolute Eos # 0.07 0.00 - 0.44 k/uL    Basophils Absolute <0.03 0.00 - 0.20 k/uL    Absolute Immature Granulocyte 0.07 0.00 - 0.30 k/uL    WBC Morphology NOT REPORTED     RBC Morphology NOT REPORTED     Platelet Estimate NOT REPORTED    BASIC METABOLIC PANEL    Collection Time: 07/22/21  6:52 AM   Result Value Ref Range    Glucose 152 (H) 70 - 99 mg/dL    BUN 33 (H) 8 - 23 mg/dL    CREATININE 0.54 0.50 - 0.90 mg/dL    Bun/Cre Ratio NOT REPORTED 9 - 20    Calcium 8.6 8.6 - 10.4 mg/dL    Sodium 133 (L) 135 - 144 mmol/L    Potassium 3.6 (L) 3.7 - 5.3 mmol/L    Chloride 96 (L) 98 - 107 mmol/L    CO2 21 20 - 31 mmol/L    Anion Gap 16 9 - 17 mmol/L    GFR Non-African American >60 >60 mL/min    GFR African American >60 >60 mL/min    GFR Comment          GFR Staging NOT REPORTED    EKG 12 Lead    Collection Time: 07/22/21 10:31 AM   Result Value Ref Range    Ventricular Rate 88 BPM    Atrial Rate 88 BPM    P-R Interval 158 ms    QRS Duration 122 ms    Q-T Interval 402 ms    QTc Calculation (Bazett) 486 ms    P Axis 38 degrees    R Axis -58 degrees    T Axis 87 degrees       Imaging/[employer]:  XR CHEST (SINGLE VIEW FRONTAL)    Result Date: 7/20/2021  New mild streaky right infrahilar atelectasis     CT HEAD WO CONTRAST    Result Date: 7/22/2021  Right thalamic intraparenchymal and intraventricular hemorrhage demonstrate appropriate interval evolution. No evidence of interval hemorrhage.  Mild dilatation of the lateral ventricles has not changed significantly since prior study. XR CHEST PORTABLE    Result Date: 7/16/2021  Essentially stable chest when compared to previous. Again the trachea is somewhat ectatic and slightly displaced to the left which could be due to positioning. As mentioned previously a CT scan could be performed if clinically indicated. Fluoroscopy modified barium swallow with video    Result Date: 7/19/2021  Deep laryngeal penetration of thick liquid. No aspiration. Please see separate speech pathology report for full discussion of findings and recommendations. Assessment :      Hospital Problems         Last Modified POA    Thalamic hemorrhage with stroke ([address] 7/13/2021 Yes    Intracranial hemorrhage ([address] 7/13/2021 Yes    Hypertensive emergency 7/13/2021 Yes    IVH (intraventricular hemorrhage) ([address] 7/13/2021 Yes    Acute left hemiparesis ([address] 7/13/2021 Yes          Plan:     1. Hypertensive emergency with right thalamic hemorrhage-improved. Now off gtt. Agree with Norvasc 10 mg daily, Cozaar 100 mg and hydralazine 25 mg for now. Given low potassium, will add Aldactone 25 mg daily. Monitor response. If improved, try and wean hydralazine. Continue checking vitals per protocol. BMP in morning to monitor Scr and potassium. 2. Hyperglycemia: Hgb A1c 6.3. Recommend lifestyle modifications. Once stable, needs outpt follow up with PCP for Metformin/continued management. Continue ISS until stable medically. 3. Hyponatremia:  Likely 2/2 hyperglycemia. Monitor. If not improved check urine studies/hyponatremia workup. 4. Obesity BMI of 31. Recommend weight loss, PT/OT, exercise as able. 5. DVT ppx  6.  PT/OT    Consultations:   IP CONSULT TO NEUROCRITICAL CARE  IP CONSULT TO NEUROSURGERY  IP CONSULT TO PHYSICAL MEDICINE REHAB  IP CONSULT TO PSYCHIATRY  IP CONSULT TO Edgard Watkins, DO  7/22/2021  3:06 PM    Copy sent to Dr. Koby Fontaine

## 2021-07-22 NOTE — PROGRESS NOTES
[facility]  Speech Language Pathology    Date: 7/22/2021  Patient Name: Ciarra Ornelas  YOB: 1940   AGE: 78 y.o. MRN: [id number]        Patient Not Available for Speech Therapy     Due to:  [] Testing  [] Hemodialysis  [] Cancelled by RN  [] Surgery   [] Intubation/Sedation/Pain Medication  [] Medical instability  [x] Other: Pt with other disciplines X1. Pt. Transported to CT. Next scheduled treatment: 7/23/2021    Completed by: [address]  Clinician    Cosigned By: Santi Villafuerte S.CCC/SLP

## 2021-07-22 NOTE — H&P
[facility]   [address] [facility]    PROGRESS NOTE   Date:   7/22/2021  Patient name:  Justo Jenkins  Date of admission:  7/12/2021  7:15 PM  MRN:   [id number]  Account:  [de-identified]  YOB: 1940  PCP:    CHRISTIANNE Peters CNP  Room:   [address] [place] [address]  Code Status:    Full Code    Subjective:     Patient seen and examined at the bedside. Patient had Percocet last night, continued to have severe headaches, was given 2 mg IV Magnesium last night without relief. Working on placement. History of Present Illness: The patient is a 78 y.o. Non- / non  female with history of migraine, HTN, BCC, HLD who presents with left hemiparesis and she is admitted to the hospital for the management of  Right thalamic hemorrhage. Patient is a transfer from [facility] for placement and headache management. She initially presented from [facility] on 7/12 with complaint of sudden headache followed by dysarthria and acute left hemiparesis. 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 CTA showed no vascular pathology. Prior to presentation, patient had missed at least 3 days of her antihypertensive medication. Patient was started on Cardene drip and admitted to MICU. Repeat head CT showed stable bleed. During patient's inpatient course, she continued to have 10/10 frontal headaches worse on the left, typical of her usual headaches. She received treatment with Decadron IV 4 mg q 8 hr total of 3 days and completed on 7/16, and was tried on numerous medications Flexuril 5 mg, Mag oxide 250 mg po daily, Excedrin, Benadryl, Dilaudid and,  IV Mag, Morphine, Depo medrol without permanent relief. Elavil was recently added for headaches. Patient expressed to her doctor that she wanted to kill herself because of her headaches. She was seen by psychiatry who recommended admission to inpatient geriatric psych facility.      On my MD   Omega-3 Fatty Acids (FISH OIL PO)     Historical Provider, MD   losartan-hydroCHLOROthiazide (HYZAAR) 100-25 MG per tablet TAKE ONE TABLET DAILY. 2/12/21   CHRISTIANNE Kimble CNP   amLODIPine (NORVASC) 5 MG tablet TAKE ONE TABLET DAILY. 2/12/21   CHRISTIANNE Kimble CNP   UNABLE TO FIND CBD Oil per pt report    Historical Provider, MD   CALCIUM-MAGNESIUM-VITAMIN D PO     Historical Provider, MD   Potassium Gluconate 595 (99 K) MG TABS Take by mouth daily    Historical Provider, MD   Flaxseed, Linseed, (FLAXSEED OIL PO)     Historical Provider, MD   aspirin-acetaminophen-caffeine ([place]) 759-749-77 MG per tablet Take 2 tablets by mouth daily as needed for Headaches    Historical Provider, MD   Magnesium Oxide 250 MG TABS Take 1 tablet by mouth daily    Historical Provider, MD   ferrous sulfate 324 (65 Fe) MG EC tablet Take 324 mg by mouth daily (with breakfast)    Historical Provider, MD        Allergies:     Clonidine derivatives, Morphine, and Sulfa antibiotics    Social History:     Tobacco:    reports that she has never smoked. She has never used smokeless tobacco.  Alcohol:      reports current alcohol use. Drug Use:  reports no history of drug use. Family History:     Family History   Problem Relation Age of Onset    Diabetes Mother     Stroke Father     Heart Disease Father        Review of Systems:     Review of Systems   Constitutional: Negative for activity change, appetite change, chills and fever. HENT: Negative for congestion. Respiratory: Negative for cough, chest tightness, shortness of breath and wheezing. Cardiovascular: Negative for chest pain and leg swelling. Gastrointestinal: Negative for abdominal distention, abdominal pain, diarrhea, nausea and vomiting. Genitourinary: Negative for dysuria and flank pain. Skin: Negative for rash. Neurological: Positive for speech difficulty, weakness and headaches. Negative for dizziness and numbness. Psychiatric/Behavioral: Positive for self-injury, sleep disturbance and suicidal ideas. Negative for agitation, behavioral problems and confusion. Physical Exam:   BP (!) 171/89   Pulse 92   Temp 97.4 °F (36.3 °C) (Oral)   Resp 20   Ht 4' 9\" (1.448 m)   Wt 141 lb 15.6 oz (64.4 kg)   SpO2 94%   BMI 30.72 kg/m²   Temp (24hrs), Av.9 °F (36.6 °C), Min:97.4 °F (36.3 °C), Max:98.6 °F (37 °C)    No results for input(s): POCGLU in the last 72 hours. Intake/Output Summary (Last 24 hours) at 2021 0840  Last data filed at 2021 0600  Gross per 24 hour   Intake 710 ml   Output 1250 ml   Net -540 ml         Neurologic Exam     Mental Status   Oriented to person, place, and time. Level of consciousness: alert    Cranial Nerves   Cranial nerves II through XII intact.      Motor Exam   Muscle bulk: normal (Strength 4+ in RU and RL extremities, 2/5 in LUE LLE )  Overall muscle tone: normalDysarthria present      Sensory Exam   Light touch normal.   Vibration normal.   Proprioception normal.   Pinprick normal.     Gait, Coordination, and Reflexes     Coordination   Finger to nose coordination: normal  Heel to shin coordination: normal    Tremor   Resting tremor: absent    Reflexes   Right brachioradialis: 2+  Left brachioradialis: 2+  Right biceps: 2+  Left biceps: 2+  Right triceps: 2+  Left triceps: 2+  Right patellar: 2+  Left patellar: 2+  Right achilles: 2+  Left achilles: 2+  Right : 2+  Left : 2+        Investigations:      Laboratory Testing:  Recent Results (from the past 24 hour(s))   CBC WITH AUTO DIFFERENTIAL    Collection Time: 21  6:52 AM   Result Value Ref Range    WBC 11.5 (H) 3.5 - 11.3 k/uL    RBC 4.66 3.95 - 5.11 m/uL    Hemoglobin 13.9 11.9 - 15.1 g/dL    Hematocrit 42.6 36.3 - 47.1 %    MCV 91.4 82.6 - 102.9 fL    MCH 29.8 25.2 - 33.5 pg    MCHC 32.6 28.4 - 34.8 g/dL    RDW 13.2 11.8 - 14.4 %    Platelets 198 333 - 451 k/uL    MPV 9.7 8.1 - 13.5 fL    NRBC Automated 0.0 0.0 per 100 WBC    Differential Type NOT REPORTED     Seg Neutrophils 70 (H) 36 - 65 %    Lymphocytes 18 (L) 24 - 43 %    Monocytes 10 3 - 12 %    Eosinophils % 1 1 - 4 %    Basophils 0 0 - 2 %    Immature Granulocytes 1 (H) 0 %    Segs Absolute 8.17 (H) 1.50 - 8.10 k/uL    Absolute Lymph # 2.10 1.10 - 3.70 k/uL    Absolute Mono # 1.10 0.10 - 1.20 k/uL    Absolute Eos # 0.07 0.00 - 0.44 k/uL    Basophils Absolute <0.03 0.00 - 0.20 k/uL    Absolute Immature Granulocyte 0.07 0.00 - 0.30 k/uL    WBC Morphology NOT REPORTED     RBC Morphology NOT REPORTED     Platelet Estimate NOT REPORTED    BASIC METABOLIC PANEL    Collection Time: 07/22/21  6:52 AM   Result Value Ref Range    Glucose 152 (H) 70 - 99 mg/dL    BUN 33 (H) 8 - 23 mg/dL    CREATININE 0.54 0.50 - 0.90 mg/dL    Bun/Cre Ratio NOT REPORTED 9 - 20    Calcium 8.6 8.6 - 10.4 mg/dL    Sodium 133 (L) 135 - 144 mmol/L    Potassium 3.6 (L) 3.7 - 5.3 mmol/L    Chloride 96 (L) 98 - 107 mmol/L    CO2 21 20 - 31 mmol/L    Anion Gap 16 9 - 17 mmol/L    GFR Non-African American >60 >60 mL/min    GFR African American >60 >60 mL/min    GFR Comment          GFR Staging NOT REPORTED          Assessment :      Primary Problem  <principal problem not specified>    Active Hospital Problems    Diagnosis Date Noted    Intracranial hemorrhage (HCC) [I62.9]     Hypertensive emergency [I16.1]     IVH (intraventricular hemorrhage) (HCC) [I61.5]     Acute left hemiparesis (HCC) [G81.94]     Thalamic hemorrhage with stroke ([facility] [I61.9] 07/12/2021       Patient is a 78 y.o. Non- / non  female  PMH of HTN, depression, migraine,  HLD who presented  On 7/12 with left hemiparesis and dysarthria. She was admitted for right thalamic hemorrhage with IVH on CT wo. Last CT head on 7/13 stable bleed. Patient safe for stepdown per neuro critical care team. Currently, patient is waiting on placement to inpatient geriatric psych facility/rehab.  She is complaining of uncontrolled headaches that make her suicidal. Receiving po Mg 400 mg and Elavil 10 mg. Received 2 g IV Magnesium overnight, still complaining of headache. Will give percocet and get EKG to check QTc.     1. Thalamic hemorrhage with IVH with residual left hemiparesis and dysarthria   2. Intractable Migraine   3. Depression with suicidal intent      Plan:     Patient status Admit as inpatient in the  Progressive Unit/Step down    ICH with residual left sided weakness, dysarthria- stable    - Transferred from [facility]  - CT head: right basal ganglia hemorrhage with intraventricular hemorrhage most likely representing rupture of the basal ganglia hemorrhage into the ventricular system w  - CTA unremarkable   - PT/OT/ST    Intractable Headache  - Elavil 10 mg po nightly   - Mg 400 mg po nightly   - Completed course of Decadron   - Percocet 5-325 once for headache   - 2 g IV Magnesium completed  - Will get repeat EKG to check QTc before proceeding with migraine cocktail     Essential HTN:  - Continue BP management with po meds: Norvasc 10 mg po daily, Cozaar 100 mg po daily , Hydralazine 25 mg bid, 50 mg nightly, Hydrouricil 25 mg po daily     Dispo: Awaiting placement for psych/rehab facility        Consultations:   IP CONSULT TO NEUROCRITICAL CARE  IP CONSULT TO NEUROSURGERY  IP CONSULT TO PHYSICAL MEDICINE REHAB  IP CONSULT TO PSYCHIATRY      Follow-up further recommendations after discussing the case with attending  The plan was discussed with the patient, patient's family and the medical staff. Patient is admitted as inpatient status because of co-morbidities listed above, severity of signs and symptoms as outlined, requirement for current medical therapies and most importantly because of direct risk to patient if care not provided in a hospital setting.     Winda Snellen, MD  7/22/2021  8:40 AM    Copy sent to Dr. Misti Urban, APRN - CNP

## 2021-07-22 NOTE — CARE COORDINATION
Patient is willing to admit to a I, she states taht she would prefer [facility] but is willing to go to [facility] if needed

## 2021-07-23 ENCOUNTER — APPOINTMENT (OUTPATIENT)
Dept: CT IMAGING | Age: 81
DRG: 065 | End: 2021-07-23
Payer: MEDICARE

## 2021-07-23 PROBLEM — E87.6 HYPOKALEMIA: Status: ACTIVE | Noted: 2021-07-23

## 2021-07-23 PROBLEM — E87.1 HYPONATREMIA: Status: ACTIVE | Noted: 2021-07-23

## 2021-07-23 LAB
ABSOLUTE EOS #: 0.04 K/UL (ref 0–0.44)
ABSOLUTE EOS #: 0.05 K/UL (ref 0–0.44)
ABSOLUTE IMMATURE GRANULOCYTE: 0.08 K/UL (ref 0–0.3)
ABSOLUTE IMMATURE GRANULOCYTE: 0.09 K/UL (ref 0–0.3)
ABSOLUTE LYMPH #: 1.77 K/UL (ref 1.1–3.7)
ABSOLUTE LYMPH #: 1.83 K/UL (ref 1.1–3.7)
ABSOLUTE MONO #: 1.28 K/UL (ref 0.1–1.2)
ABSOLUTE MONO #: 1.29 K/UL (ref 0.1–1.2)
ALBUMIN SERPL-MCNC: 3.4 G/DL (ref 3.5–5.2)
ALBUMIN/GLOBULIN RATIO: 1.2 (ref 1–2.5)
ALLEN TEST: ABNORMAL
ALP BLD-CCNC: 70 U/L (ref 35–104)
ALT SERPL-CCNC: 54 U/L (ref 5–33)
AMMONIA: 70 UMOL/L (ref 11–51)
ANION GAP SERPL CALCULATED.3IONS-SCNC: 11 MMOL/L (ref 9–17)
ANION GAP SERPL CALCULATED.3IONS-SCNC: 14 MMOL/L (ref 9–17)
AST SERPL-CCNC: 30 U/L
BASOPHILS # BLD: 0 % (ref 0–2)
BASOPHILS # BLD: 0 % (ref 0–2)
BASOPHILS ABSOLUTE: 0.04 K/UL (ref 0–0.2)
BASOPHILS ABSOLUTE: <0.03 K/UL (ref 0–0.2)
BILIRUB SERPL-MCNC: 0.66 MG/DL (ref 0.3–1.2)
BILIRUBIN DIRECT: 0.18 MG/DL
BILIRUBIN, INDIRECT: 0.48 MG/DL (ref 0–1)
BUN BLDV-MCNC: 28 MG/DL (ref 8–23)
BUN BLDV-MCNC: 29 MG/DL (ref 8–23)
BUN/CREAT BLD: ABNORMAL (ref 9–20)
BUN/CREAT BLD: ABNORMAL (ref 9–20)
CALCIUM SERPL-MCNC: 8.7 MG/DL (ref 8.6–10.4)
CALCIUM SERPL-MCNC: 8.7 MG/DL (ref 8.6–10.4)
CHLORIDE BLD-SCNC: 95 MMOL/L (ref 98–107)
CHLORIDE BLD-SCNC: 97 MMOL/L (ref 98–107)
CO2: 20 MMOL/L (ref 20–31)
CO2: 26 MMOL/L (ref 20–31)
CREAT SERPL-MCNC: 0.48 MG/DL (ref 0.5–0.9)
CREAT SERPL-MCNC: 0.59 MG/DL (ref 0.5–0.9)
DIFFERENTIAL TYPE: ABNORMAL
DIFFERENTIAL TYPE: ABNORMAL
EKG ATRIAL RATE: 88 BPM
EKG P AXIS: 38 DEGREES
EKG P-R INTERVAL: 158 MS
EKG Q-T INTERVAL: 402 MS
EKG QRS DURATION: 122 MS
EKG QTC CALCULATION (BAZETT): 486 MS
EKG R AXIS: -58 DEGREES
EKG T AXIS: 87 DEGREES
EKG VENTRICULAR RATE: 88 BPM
EOSINOPHILS RELATIVE PERCENT: 0 % (ref 1–4)
EOSINOPHILS RELATIVE PERCENT: 0 % (ref 1–4)
FIO2: 32
GFR AFRICAN AMERICAN: >60 ML/MIN
GFR AFRICAN AMERICAN: >60 ML/MIN
GFR NON-AFRICAN AMERICAN: >60 ML/MIN
GFR NON-AFRICAN AMERICAN: >60 ML/MIN
GFR SERPL CREATININE-BSD FRML MDRD: ABNORMAL ML/MIN/{1.73_M2}
GLOBULIN: ABNORMAL G/DL (ref 1.5–3.8)
GLUCOSE BLD-MCNC: 149 MG/DL (ref 70–99)
GLUCOSE BLD-MCNC: 156 MG/DL (ref 70–99)
HCT VFR BLD CALC: 43.8 % (ref 36.3–47.1)
HCT VFR BLD CALC: 45.6 % (ref 36.3–47.1)
HEMOGLOBIN: 14.6 G/DL (ref 11.9–15.1)
HEMOGLOBIN: 14.7 G/DL (ref 11.9–15.1)
IMMATURE GRANULOCYTES: 1 %
IMMATURE GRANULOCYTES: 1 %
LYMPHOCYTES # BLD: 13 % (ref 24–43)
LYMPHOCYTES # BLD: 15 % (ref 24–43)
MAGNESIUM: 2.3 MG/DL (ref 1.6–2.6)
MCH RBC QN AUTO: 29.6 PG (ref 25.2–33.5)
MCH RBC QN AUTO: 30.1 PG (ref 25.2–33.5)
MCHC RBC AUTO-ENTMCNC: 32 G/DL (ref 28.4–34.8)
MCHC RBC AUTO-ENTMCNC: 33.6 G/DL (ref 28.4–34.8)
MCV RBC AUTO: 89.6 FL (ref 82.6–102.9)
MCV RBC AUTO: 92.3 FL (ref 82.6–102.9)
MODE: ABNORMAL
MONOCYTES # BLD: 11 % (ref 3–12)
MONOCYTES # BLD: 9 % (ref 3–12)
NEGATIVE BASE EXCESS, ART: ABNORMAL (ref 0–2)
NRBC AUTOMATED: 0 PER 100 WBC
NRBC AUTOMATED: 0 PER 100 WBC
O2 DEVICE/FLOW/%: ABNORMAL
PATIENT TEMP: ABNORMAL
PDW BLD-RTO: 12.9 % (ref 11.8–14.4)
PDW BLD-RTO: 13 % (ref 11.8–14.4)
PLATELET # BLD: 329 K/UL (ref 138–453)
PLATELET # BLD: 360 K/UL (ref 138–453)
PLATELET ESTIMATE: ABNORMAL
PLATELET ESTIMATE: ABNORMAL
PMV BLD AUTO: 10.1 FL (ref 8.1–13.5)
PMV BLD AUTO: 9.6 FL (ref 8.1–13.5)
POC HCO3: 27.3 MMOL/L (ref 21–28)
POC O2 SATURATION: 96 % (ref 94–98)
POC PCO2 TEMP: ABNORMAL MM HG
POC PCO2: 37.3 MM HG (ref 35–48)
POC PH TEMP: ABNORMAL
POC PH: 7.47 (ref 7.35–7.45)
POC PO2 TEMP: ABNORMAL MM HG
POC PO2: 78.5 MM HG (ref 83–108)
POSITIVE BASE EXCESS, ART: 4 (ref 0–3)
POTASSIUM SERPL-SCNC: 3.4 MMOL/L (ref 3.7–5.3)
POTASSIUM SERPL-SCNC: 3.7 MMOL/L (ref 3.7–5.3)
RBC # BLD: 4.89 M/UL (ref 3.95–5.11)
RBC # BLD: 4.94 M/UL (ref 3.95–5.11)
RBC # BLD: ABNORMAL 10*6/UL
RBC # BLD: ABNORMAL 10*6/UL
SAMPLE SITE: ABNORMAL
SEG NEUTROPHILS: 73 % (ref 36–65)
SEG NEUTROPHILS: 77 % (ref 36–65)
SEGMENTED NEUTROPHILS ABSOLUTE COUNT: 10.95 K/UL (ref 1.5–8.1)
SEGMENTED NEUTROPHILS ABSOLUTE COUNT: 8.9 K/UL (ref 1.5–8.1)
SODIUM BLD-SCNC: 131 MMOL/L (ref 135–144)
SODIUM BLD-SCNC: 132 MMOL/L (ref 135–144)
TCO2 (CALC), ART: ABNORMAL MMOL/L (ref 22–29)
TOTAL PROTEIN: 6.3 G/DL (ref 6.4–8.3)
VALPROIC ACID LEVEL: 32 UG/ML (ref 50–125)
VALPROIC ACID, FREE: 5.1 UG/ML (ref 7–23)
VALPROIC DATE LAST DOSE: ABNORMAL
VALPROIC DOSE AMOUNT: ABNORMAL
VALPROIC TIME LAST DOSE: ABNORMAL
WBC # BLD: 12.1 K/UL (ref 3.5–11.3)
WBC # BLD: 14.2 K/UL (ref 3.5–11.3)
WBC # BLD: ABNORMAL 10*3/UL
WBC # BLD: ABNORMAL 10*3/UL

## 2021-07-23 PROCEDURE — 70450 CT HEAD/BRAIN W/O DYE: CPT

## 2021-07-23 PROCEDURE — 82803 BLOOD GASES ANY COMBINATION: CPT

## 2021-07-23 PROCEDURE — 6360000002 HC RX W HCPCS: Performed by: STUDENT IN AN ORGANIZED HEALTH CARE EDUCATION/TRAINING PROGRAM

## 2021-07-23 PROCEDURE — 94761 N-INVAS EAR/PLS OXIMETRY MLT: CPT

## 2021-07-23 PROCEDURE — 6370000000 HC RX 637 (ALT 250 FOR IP): Performed by: PSYCHIATRY & NEUROLOGY

## 2021-07-23 PROCEDURE — 6370000000 HC RX 637 (ALT 250 FOR IP): Performed by: FAMILY MEDICINE

## 2021-07-23 PROCEDURE — 2580000003 HC RX 258: Performed by: PSYCHIATRY & NEUROLOGY

## 2021-07-23 PROCEDURE — 83735 ASSAY OF MAGNESIUM: CPT

## 2021-07-23 PROCEDURE — 99232 SBSQ HOSP IP/OBS MODERATE 35: CPT | Performed by: STUDENT IN AN ORGANIZED HEALTH CARE EDUCATION/TRAINING PROGRAM

## 2021-07-23 PROCEDURE — 6370000000 HC RX 637 (ALT 250 FOR IP): Performed by: INTERNAL MEDICINE

## 2021-07-23 PROCEDURE — 6360000002 HC RX W HCPCS: Performed by: NURSE PRACTITIONER

## 2021-07-23 PROCEDURE — 80165 DIPROPYLACETIC ACID FREE: CPT

## 2021-07-23 PROCEDURE — 36415 COLL VENOUS BLD VENIPUNCTURE: CPT

## 2021-07-23 PROCEDURE — 97110 THERAPEUTIC EXERCISES: CPT

## 2021-07-23 PROCEDURE — 36600 WITHDRAWAL OF ARTERIAL BLOOD: CPT

## 2021-07-23 PROCEDURE — 6370000000 HC RX 637 (ALT 250 FOR IP): Performed by: STUDENT IN AN ORGANIZED HEALTH CARE EDUCATION/TRAINING PROGRAM

## 2021-07-23 PROCEDURE — 80048 BASIC METABOLIC PNL TOTAL CA: CPT

## 2021-07-23 PROCEDURE — 85025 COMPLETE CBC W/AUTO DIFF WBC: CPT

## 2021-07-23 PROCEDURE — 82140 ASSAY OF AMMONIA: CPT

## 2021-07-23 PROCEDURE — 99232 SBSQ HOSP IP/OBS MODERATE 35: CPT | Performed by: PSYCHIATRY & NEUROLOGY

## 2021-07-23 PROCEDURE — 2060000000 HC ICU INTERMEDIATE R&B

## 2021-07-23 PROCEDURE — 6370000000 HC RX 637 (ALT 250 FOR IP): Performed by: NURSE PRACTITIONER

## 2021-07-23 PROCEDURE — 80076 HEPATIC FUNCTION PANEL: CPT

## 2021-07-23 PROCEDURE — 80164 ASSAY DIPROPYLACETIC ACD TOT: CPT

## 2021-07-23 PROCEDURE — 2700000000 HC OXYGEN THERAPY PER DAY

## 2021-07-23 RX ORDER — SPIRONOLACTONE 25 MG/1
50 TABLET ORAL DAILY
Status: DISCONTINUED | OUTPATIENT
Start: 2021-07-24 | End: 2021-07-25

## 2021-07-23 RX ORDER — POTASSIUM CHLORIDE 7.45 MG/ML
20 INJECTION INTRAVENOUS ONCE
Status: COMPLETED | OUTPATIENT
Start: 2021-07-23 | End: 2021-07-23

## 2021-07-23 RX ORDER — POTASSIUM CHLORIDE 7.45 MG/ML
10 INJECTION INTRAVENOUS
Status: DISPENSED | OUTPATIENT
Start: 2021-07-23 | End: 2021-07-23

## 2021-07-23 RX ADMIN — POTASSIUM CHLORIDE 20 MEQ: 7.46 INJECTION, SOLUTION INTRAVENOUS at 22:50

## 2021-07-23 RX ADMIN — CHOLESTYRAMINE 4 G: 4 POWDER, FOR SUSPENSION ORAL at 21:58

## 2021-07-23 RX ADMIN — ACETAMINOPHEN 650 MG: 325 TABLET ORAL at 06:07

## 2021-07-23 RX ADMIN — MAGNESIUM SULFATE 2000 MG: 2 INJECTION INTRAVENOUS at 00:32

## 2021-07-23 RX ADMIN — DICLOFENAC 2 G: 10 GEL TOPICAL at 21:58

## 2021-07-23 RX ADMIN — SODIUM CHLORIDE, PRESERVATIVE FREE 10 ML: 5 INJECTION INTRAVENOUS at 11:33

## 2021-07-23 RX ADMIN — HYDROCHLOROTHIAZIDE 25 MG: 25 TABLET ORAL at 09:29

## 2021-07-23 RX ADMIN — GABAPENTIN 300 MG: 300 CAPSULE ORAL at 21:58

## 2021-07-23 RX ADMIN — POTASSIUM CHLORIDE 10 MEQ: 10 INJECTION, SOLUTION INTRAVENOUS at 18:58

## 2021-07-23 RX ADMIN — ACETAMINOPHEN 650 MG: 325 TABLET ORAL at 10:45

## 2021-07-23 RX ADMIN — BUTALBITAL, ACETAMINOPHEN AND CAFFEINE 1 TABLET: 50; 325; 40 TABLET ORAL at 22:00

## 2021-07-23 RX ADMIN — MAGNESIUM GLUCONATE 500 MG ORAL TABLET 400 MG: 500 TABLET ORAL at 09:29

## 2021-07-23 RX ADMIN — AMLODIPINE BESYLATE 10 MG: 10 TABLET ORAL at 09:30

## 2021-07-23 RX ADMIN — LOSARTAN POTASSIUM 100 MG: 50 TABLET, FILM COATED ORAL at 09:30

## 2021-07-23 RX ADMIN — SPIRONOLACTONE 25 MG: 25 TABLET ORAL at 09:29

## 2021-07-23 RX ADMIN — GABAPENTIN 300 MG: 300 CAPSULE ORAL at 09:30

## 2021-07-23 RX ADMIN — DIVALPROEX SODIUM 250 MG: 250 TABLET, DELAYED RELEASE ORAL at 09:30

## 2021-07-23 RX ADMIN — ENOXAPARIN SODIUM 40 MG: 40 INJECTION SUBCUTANEOUS at 10:44

## 2021-07-23 RX ADMIN — POTASSIUM CHLORIDE 10 MEQ: 10 INJECTION, SOLUTION INTRAVENOUS at 17:41

## 2021-07-23 ASSESSMENT — PAIN DESCRIPTION - PAIN TYPE
TYPE: CHRONIC PAIN
TYPE: CHRONIC PAIN

## 2021-07-23 ASSESSMENT — PAIN DESCRIPTION - DESCRIPTORS
DESCRIPTORS: ACHING
DESCRIPTORS: HEADACHE

## 2021-07-23 ASSESSMENT — PAIN DESCRIPTION - LOCATION
LOCATION: HEAD;NECK
LOCATION: HEAD

## 2021-07-23 ASSESSMENT — PAIN DESCRIPTION - ONSET
ONSET: ON-GOING
ONSET: ON-GOING

## 2021-07-23 ASSESSMENT — PAIN DESCRIPTION - FREQUENCY
FREQUENCY: CONTINUOUS
FREQUENCY: CONTINUOUS

## 2021-07-23 ASSESSMENT — PAIN SCALES - GENERAL
PAINLEVEL_OUTOF10: 8
PAINLEVEL_OUTOF10: 5
PAINLEVEL_OUTOF10: 8

## 2021-07-23 NOTE — PLAN OF CARE
Nutrition Problem #1: Predicted inadequate energy intake  Intervention: Food and/or Nutrient Delivery: Continue Current Diet, Modify Oral Nutrition Supplement  Nutritional Goals: Meet >50% of estimated nutrient needs

## 2021-07-23 NOTE — PROGRESS NOTES
[facility] Neurology   [address] [place] [address]     PROGRESS NOTE   Date:                            7/22/2021  Patient name:              Janice Lara  Date of admission:      7/12/2021  7:15 PM  MRN:                           [id number]  Account:                      [id number]  YOB: 1940  PCP:                            CHRISTIANNE Alexandra CNP  Room:                          [address] [place] [address]  Code Status:               Full Code     Subjective:      Patient seen and examined at the bedside. Last night, patient was complaining of new right sided sensory loss. STAT Head CT was performed which was stable compared to CT on 7/22. This morning patient is AAOx3, states her headache has improved with Depakote. Denies suicidal ideation.         History of Present Illness:      The patient is a 78 y.o. Non- / non  female with history of migraine, HTN, BCC, HLD who presents with left hemiparesis and she is admitted to the hospital for the management of  Right thalamic hemorrhage. Patient is a transfer from [facility] for placement and headache management. She initially presented from [facility] on 7/12 with complaint of sudden headache followed by dysarthria and acute left hemiparesis. 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 CTA showed no vascular pathology. Prior to presentation, patient had missed at least 3 days of her antihypertensive medication.      Patient was started on Cardene drip and admitted to MICU. Repeat head CT showed stable bleed. During patient's inpatient course, she continued to have 10/10 frontal headaches worse on the left, typical of her usual headaches.  She received treatment with Decadron IV 4 mg q 8 hr total of 3 days and completed on 7/16, and was tried on numerous medications Flexuril 5 mg, Mag oxide 250 mg po daily, Excedrin, Benadryl, Dilaudid and,  IV Mag, Morphine, Depo medrol without permanent relief. Elavil was recently added for headaches. Patient expressed to her doctor that she wanted to kill herself because of her headaches. She was seen by psychiatry who recommended admission to inpatient geriatric psych facility.      On my evaluation, patient is still suicidal with sitter by bedside. She stated that she \"wants to bang her head against the wall\" and end her life because of her headaches. When asked if anyone has visited her from her family or if there was anyone she could share her feelings with, the patient stated that her family has been up to visit recently, but she has not told them of her suicidal ideation because she does not want to hurt them. Patient then expressed that she has not ended her life because she doesn't want to hurt her family.      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 with unspecified severity (HCC)      Nondisp fx of fourth metatarsal bone of right foot with routine heal      Obstructive sleep apnea      Osteoarthritis      Pain in right foot      Spinal stenosis      Surgical aftercare, neoplasm      Vitamin D deficiency              Past Surgical History:      Past Surgical History         Past Surgical History:   Procedure Laterality Date    APPENDECTOMY        CHOLECYSTECTOMY        HERNIA REPAIR        HYSTERECTOMY        TONSILLECTOMY        TUBAL LIGATION                Medications Prior to Admission:      Home Medications           Prior to Admission medications    Medication Sig Start Date End Date Taking?  Authorizing Provider   cholestyramine (QUESTRAN) 4 GM/DOSE powder TAKE ONE SCOOP DISSOLVED IN A GLASS OF WATER TWICE A DAY AS NEEDED. 5/17/21     Lawson BreathCHRISTIANNE CNP   NONFORMULARY Indications: Natural supplement to help with elevated BP STRICTION BP - 2 TABLETS 2 TIMES A DAY       Historical Provider, MD   Aspirin Buf,CaCarb-MgCarb-MgO, (BUFFERIN PO) Take 2 tablets by mouth 4 times daily as needed Per pt       Historical Provider, MD   Melatonin 10 MG TABS Take by mouth nightly as needed       Historical Provider, MD   Omega-3 Fatty Acids (FISH OIL PO)         Historical Provider, MD   losartan-hydroCHLOROthiazide (HYZAAR) 100-25 MG per tablet TAKE ONE TABLET DAILY. 2/12/21     Lawson Breath, APRN - CNP   amLODIPine (NORVASC) 5 MG tablet TAKE ONE TABLET DAILY. 2/12/21     CHRISTIANNE Saravia CNP   UNABLE TO FIND CBD Oil per pt report       Historical Provider, MD   CALCIUM-MAGNESIUM-VITAMIN D PO         Historical Provider, MD   Potassium Gluconate 595 (99 K) MG TABS Take by mouth daily       Historical Provider, MD   Flaxmary Linseed, (FLAXSEED OIL PO)         Historical Provider, MD   aspirin-acetaminophen-caffeine (EXCEDRIN MIGRAINE) 773-389-67 MG per tablet Take 2 tablets by mouth daily as needed for Headaches       Historical Provider, MD   Magnesium Oxide 250 MG TABS Take 1 tablet by mouth daily       Historical Provider, MD   ferrous sulfate 324 (65 Fe) MG EC tablet Take 324 mg by mouth daily (with breakfast)       Historical Provider, MD            Allergies:      Clonidine derivatives, Morphine, and Sulfa antibiotics     Social History:      Tobacco:    reports that she has never smoked. She has never used smokeless tobacco.  Alcohol:      reports current alcohol use. Drug Use:  reports no history of drug use.     Family History:      Family History         Family History   Problem Relation Age of Onset    Diabetes Mother      Stroke Father      Heart Disease Father              Review of Systems:      Review of Systems   Constitutional: Negative for activity change, appetite change, chills and fever.    HENT: Negative for congestion. Respiratory: Negative for cough, chest tightness, shortness of breath and wheezing. Cardiovascular: Negative for chest pain and leg swelling. Gastrointestinal: Negative for abdominal distention, abdominal pain, diarrhea, nausea and vomiting. Genitourinary: Negative for dysuria and flank pain. Skin: Negative for rash. Neurological: Positive for speech difficulty, weakness and headaches. Negative for dizziness and numbness. Psychiatric/Behavioral: Positive for self-injury, sleep disturbance and suicidal ideas. Negative for agitation, behavioral problems and confusion.            Physical Exam:   BP (!) 171/89   Pulse 92   Temp 97.4 °F (36.3 °C) (Oral)   Resp 20   Ht 4' 9\" (1.448 m)   Wt 141 lb 15.6 oz (64.4 kg)   SpO2 94%   BMI 30.72 kg/m²   Temp (24hrs), Av.9 °F (36.6 °C), Min:97.4 °F (36.3 °C), Max:98.6 °F (37 °C)     No results for input(s): POCGLU in the last 72 hours.     Intake/Output Summary (Last 24 hours) at 2021 0840  Last data filed at 2021 0600      Gross per 24 hour   Intake 710 ml   Output 1250 ml   Net -540 ml            Neurologic Exam      Mental Status   Oriented to person, place, and time.    Level of consciousness: alert     Cranial Nerves   Cranial nerves II through XII intact.      Motor Exam   Muscle bulk: normal (Strength 4+ in RU and RL extremities, 2/5 in LUE LLE )  Overall muscle tone: normalDysarthria present       Sensory Exam   Light touch normal.   Vibration normal.   Proprioception normal.   Pinprick normal.      Gait, Coordination, and Reflexes      Coordination   Finger to nose coordination: normal  Heel to shin coordination: normal     Tremor   Resting tremor: absent     Reflexes   Right brachioradialis: 2+  Left brachioradialis: 2+  Right biceps: 2+  Left biceps: 2+  Right triceps: 2+  Left triceps: 2+  Right patellar: 2+  Left patellar: 2+  Right achilles: 2+  Left achilles: 2+  Right : 2+  Left : 2+           Investigations:       Laboratory Testing:  Recent Results         Recent Results (from the past 24 hour(s))   CBC WITH AUTO DIFFERENTIAL     Collection Time: 07/22/21  6:52 AM   Result Value Ref Range     WBC 11.5 (H) 3.5 - 11.3 k/uL     RBC 4.66 3.95 - 5.11 m/uL     Hemoglobin 13.9 11.9 - 15.1 g/dL     Hematocrit 42.6 36.3 - 47.1 %     MCV 91.4 82.6 - 102.9 fL     MCH 29.8 25.2 - 33.5 pg     MCHC 32.6 28.4 - 34.8 g/dL     RDW 13.2 11.8 - 14.4 %     Platelets 680 593 - 856 k/uL     MPV 9.7 8.1 - 13.5 fL     NRBC Automated 0.0 0.0 per 100 WBC     Differential Type NOT REPORTED       Seg Neutrophils 70 (H) 36 - 65 %     Lymphocytes 18 (L) 24 - 43 %     Monocytes 10 3 - 12 %     Eosinophils % 1 1 - 4 %     Basophils 0 0 - 2 %     Immature Granulocytes 1 (H) 0 %     Segs Absolute 8.17 (H) 1.50 - 8.10 k/uL     Absolute Lymph # 2.10 1.10 - 3.70 k/uL     Absolute Mono # 1.10 0.10 - 1.20 k/uL     Absolute Eos # 0.07 0.00 - 0.44 k/uL     Basophils Absolute <0.03 0.00 - 0.20 k/uL     Absolute Immature Granulocyte 0.07 0.00 - 0.30 k/uL     WBC Morphology NOT REPORTED       RBC Morphology NOT REPORTED       Platelet Estimate NOT REPORTED     BASIC METABOLIC PANEL     Collection Time: 07/22/21  6:52 AM   Result Value Ref Range     Glucose 152 (H) 70 - 99 mg/dL     BUN 33 (H) 8 - 23 mg/dL     CREATININE 0.54 0.50 - 0.90 mg/dL     Bun/Cre Ratio NOT REPORTED 9 - 20     Calcium 8.6 8.6 - 10.4 mg/dL     Sodium 133 (L) 135 - 144 mmol/L     Potassium 3.6 (L) 3.7 - 5.3 mmol/L     Chloride 96 (L) 98 - 107 mmol/L     CO2 21 20 - 31 mmol/L     Anion Gap 16 9 - 17 mmol/L     GFR Non-African American >60 >60 mL/min     GFR African American >60 >60 mL/min     GFR Comment            GFR Staging NOT REPORTED                 Assessment :       Primary Problem  <principal problem not specified>          Active Hospital Problems     Diagnosis Date Noted    [facility] [I62.9]      Hypertensive emergency [I16.1]    IVH (intraventricular hemorrhage) (HCC) [I61.5]      Acute left hemiparesis ([facility]) [G81.94]      Thalamic hemorrhage with stroke ([facility] [I61.9] 07/12/2021         Patient is a 78 y.o. Non- / non  female  PMH of HTN, depression, migraine,  HLD who presented  On 7/12 with left hemiparesis and dysarthria. She was admitted for right thalamic hemorrhage with IVH on CT wo. Last CT head on 7/13 stable bleed. Patient safe for stepdown per neuro critical care team.       7/23: CT head repeated last night due to patients acute change in mental status AOx1 complaints of new right leg sensory loss. CT head stable. This AM, patient's headache improved with Depakote, denies suicidal ideation. Will reconsult [facility]. Medicine on  board managing BP medication. Upon reevaluation with attending, patient appears unusually drowsy compared to baseline. Will get STAT CT head. VBG and ammonia ordered by neurocrit.        1. Thalamic hemorrhage with IVH with residual left hemiparesis and dysarthria   2. AMS  3. Intractable Migraine   4.  Depression with suicidal intent       Plan:      Patient status Admit as inpatient in the  Progressive Unit/Step down     ICH with residual left sided weakness, dysarthria- new onset AMS on 7/23    - Transferred from [facility]  - CT head: right basal ganglia hemorrhage with intraventricular hemorrhage most likely representing rupture of the basal ganglia hemorrhage into the ventricular system w  - CTA unremarkable   - PT/OT/ST  - CT head on 7/23 2 AM: Stable volume and extent of acute intraparenchymal hemorrhage involving   the mid to posterior right thalamus.   - Repeat CT pending   - Ammonia and VBG ordered per neurocritical care   -     Intractable Migraine   - Elavil 10 mg po nightly d/c on 7/22   - Mg 400 mg po nightly   - Completed course of Decadron   - Percocet 5-325 once for headache   - 2 g IV Magnesium completed  - Depakote 1g IV loading, then 250 mg bid thereafter for prophylaxis   - Fiorcicet 1 tab q 6 hrs PRN   - Depakote levels pending     Essential HTN:  - Management per intermed      Dispo: Awaiting placement for psych/rehab facility  Will reconsult psych            Consultations:   IP CONSULT TO NEUROCRITICAL CARE  IP CONSULT TO NEUROSURGERY  IP CONSULT TO PHYSICAL MEDICINE REHAB  IP CONSULT TO PSYCHIATRY        Follow-up further recommendations after discussing the case with attending  The plan was discussed with the patient, patient's family and the medical staff.      Patient is admitted as inpatient status because of co-morbidities listed above, severity of signs and symptoms as outlined, requirement for current medical therapies and most importantly because of direct risk to patient if care not provided in a hospital setting.  Luna Ford MD  7/22/2021  8:40 AM     Copy sent to Dr. Don Velez, CHRISTIANNE - CNP

## 2021-07-23 NOTE — CARE COORDINATION
[facility] Quality Flow/Interdisciplinary Rounds Progress Note    Quality Flow Rounds held on July 23, 2021 at [address] Attending:  Bedside Nurse, ,  and Nursing Unit Leadership    Barriers to Discharge: psych Re-eval; peer to peer. Lethargy    Anticipated Discharge Date:  Expected Discharge Date: 07/23/21    Anticipated Discharge Disposition: SNF vs BHI    Readmission Risk              Risk of Unplanned Readmission:  17           Discussed patient goal for the day, patient clinical progression, and barriers to discharge. The following Goal(s) of the Day/Commitment(s) have been identified:  SNF Discharge - Check H&P Update, Medication Reconciliation, and Transfer Form    Left  for [facility] Admissions. Left  for Appeal coordinator. 1150 call back from [employer]; She has not heard anything regarding appeal. Notified her that psych is re-evaluating patient. 1325 Call back from [employer] patient approved until 7/26.        Miranda Hurtado RN  July 23, 2021

## 2021-07-23 NOTE — PROGRESS NOTES
Physical Therapy  Facility/Department: [facility] NEURO  Daily Treatment Note  NAME: Kirti Avina  : 1940  MRN: [id number]    Date of Service: 2021    Discharge Recommendations:  Patient would benefit from continued therapy after discharge   PT Equipment Recommendations  Equipment Needed: No    Assessment   Body structures, Functions, Activity limitations: Decreased functional mobility ; Decreased strength;Decreased safe awareness;Decreased balance;Decreased sensation;Decreased endurance;Decreased ROM; Decreased vision/visual deficit  Assessment: PROM x4 performed this date, maxA to perform L rolling for pressure relief. Pt would benefit from conitnued PT to address all functional mobility deficits and improve mobility in LUE/LLE. Prognosis: Fair  PT Education: Goals;PT Role;Plan of Care;General Safety;Transfer Training;Orientation; Family Education;Equipment;Disease Specific Education;Gait Training;Functional Mobility Training; Injury Prevention  Barriers to Learning: L neglect  REQUIRES PT FOLLOW UP: Yes  Activity Tolerance  Activity Tolerance: Patient limited by cognitive status     Patient Diagnosis(es): The encounter diagnosis was Intracranial hemorrhage ([facility] 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 has a past surgical history that includes Appendectomy; Cholecystectomy; hernia repair; Tonsillectomy; Tubal ligation; and Hysterectomy.     Restrictions  Restrictions/Precautions  Restrictions/Precautions: General Precautions, Fall Risk, Up as Tolerated  Required Braces or Orthoses?: No  Position Activity Restriction  Other position/activity restrictions: SBP< 160  Subjective   General  Response To Previous Treatment: Patient with no complaints from previous session. Family / Caregiver Present: No  Subjective  Subjective: RN and pt in agreement for PT treatment; pt supine in bed upon PT arrival, pt does not open eyes to writer, follows one command with R hand  Pain Screening  Patient Currently in Pain: Other (comment) (unable to assess- pt does not verbalize throughout session, no significant change in vitals noted throughout session)  Vital Signs  Patient Currently in Pain: Other (comment) (unable to assess- pt does not verbalize throughout session, no significant change in vitals noted throughout session)       Orientation  Orientation  Overall Orientation Status:  (unable to assess- pt does not verbalize throughout session or open eyes to writer)  Cognition   Cognition  Overall Cognitive Status: Exceptions  Arousal/Alertness: Delayed responses to stimuli;Inconsistent responses to stimuli  Following Commands: Follows one step commands with repetition; Follows one step commands with increased time  Attention Span: Difficulty attending to directions; Difficulty dividing attention  Memory: Decreased recall of recent events  Safety Judgement: Decreased awareness of need for assistance;Decreased awareness of need for safety  Problem Solving: Decreased awareness of errors;Assistance required to identify errors made;Assistance required to correct errors made;Assistance required to implement solutions;Assistance required to generate solutions  Insights: Decreased awareness of deficits  Initiation: Requires cues for all  Sequencing: Requires cues for all  Objective   Bed mobility  Rolling to Right: Maximum assistance  Comment: Minimal assistance noted from patient this date  Transfers  Comment: unable to assess due to cognition this date  Ambulation  Ambulation?: No  Stairs/Curb  Stairs?: No     Balance  Comments: Unable to assess due to cognition this date       PROM x4 in all planes  Bilateral gastroc stretch 2 x 30s    Goals  Short term goals  Time Frame for Short term goals: 12 visits  Short term goal 1: Perform bed mob Ariella+1  Short term goal 2: Perform transfers modA+1  Short term goal 3: Ambulate 22' modA+2 w/ appropriate device  Short term goal 4: Dangle EOB ~15 min SBA  Short term goal 5: Facilitate active movement LUE/LLE  Patient Goals   Patient goals : Go home    Plan    Plan  Times per week: 5-6 visits weekly  Times per day: Daily  Current Treatment Recommendations: Strengthening, ROM, Balance Training, Functional Mobility Training, Gait Training, Endurance Training, Transfer Training, Safety Education & Training, Wheelchair Mobility Training, Neuromuscular Re-education, Cognitive Reorientation, Pain Management, Home Exercise Program, Patient/Caregiver Education & Training, Positioning  Safety Devices  Type of devices: Bed alarm in place, Call light within reach, Patient at risk for falls, Left in bed, Sitter present  Restraints  Initially in place: No     Therapy Time   Individual Concurrent Group Co-treatment   Time In 1526         Time Out 1545         Minutes 19         Timed Code Treatment Minutes: 8 Minutes       Damian Rogel, PT

## 2021-07-23 NOTE — PROGRESS NOTES
[facility]  Speech Language Pathology    Date: 7/23/2021  Patient Name: Dawson Degroot  YOB: 1940   AGE: 78 y.o. MRN: [id number]        Patient Not Available for Speech Therapy     Due to:  [] Testing  [] Hemodialysis  [] Cancelled by RN  [] Surgery   [] Intubation/Sedation/Pain Medication  [] Medical instability  [x] Other: ST attempted. Pt. Lethargic; unable to stay awake and alert to respond to ST questions. Next scheduled treatment: 7/26/2021    Completed by: [address]  Clinician    Cosigned By: Fernando Davidson S.CCC/SLP

## 2021-07-23 NOTE — PROGRESS NOTES
Occupational [address]  Occupational Therapy Not Seen Note    DATE: 2021  Name: Dawson Degroot  : 1940  MRN: [id number]    Patient not available for Occupational Therapy due to:    [] Testing:    [] Hemodialysis    [] Blood Transfusion in Progress    [] Refusal by Patient:    [] Surgery/Procedure:    [] Strict Bedrest    [] Sedation    [] Spine Precautions     [] Pt with medical decline and not appropriate for continued therapy services. Spoke with pt/family and OT services to be defered. [] Pt independent with functional mobility and functional tasks. Pt with no OT acute care needs at this time, will defer OT eval.    [x] Other: Pt not responding to writer to participate in OT tx.        Next Scheduled Treatment: 21    [address]

## 2021-07-23 NOTE — PROGRESS NOTES
Paged by RN for altered mentation (worse compared to yesterday), headache and right leg numbness. Patient was AO x1, complaining of severe occipital headache. Received 2 g IV magnesium around 12 AM.  Stat CT head ordered.     Tito Powell MD  Neurology PGY-2 Resident  7/23/2021  4:21 AM

## 2021-07-23 NOTE — PLAN OF CARE
Problem: Falls - Risk of:  Goal: Will remain free from falls  Description: Will remain free from falls  Outcome: Ongoing  Goal: Absence of physical injury  Description: Absence of physical injury  Outcome: Ongoing    Pt assessed as a fall risk this shift. Remains free from falls and accidental injury at this time. Fall precautions in place, including falling star sign. Floor free from obstacles, and bed is locked and in lowest position. Adequate lighting provided. Pt encouraged to call before getting Out Of Bed for any need. Will continue to monitor needs during hourly rounding, and reinforce education on use of call light. Problem: HEMODYNAMIC STATUS  Goal: Patient has stable vital signs and fluid balance  Outcome: Ongoing     Problem: ACTIVITY INTOLERANCE/IMPAIRED MOBILITY  Goal: Mobility/activity is maintained at optimum level for patient  Outcome: Ongoing     Problem: COMMUNICATION IMPAIRMENT  Goal: Ability to express needs and understand communication  Outcome: Ongoing    Neuro assessment completed, fall precautions in place, aspirations precautions in place, assess for barriers in communication and mobility, interventions to assist in communication and mobility in place, encouraged to call for assistance, adaptive devices used as needed, assess emotional state and support offered, encouraged patient to communicate by available means, and support systems included in patient care. Change in mentation at 4am, stat CT ordered.       Problem: Coping:  Goal: Ability to cope will improve  Description: Ability to cope will improve  Outcome: Ongoing  Goal: Ability to identify appropriate support needs will improve  Description: Ability to identify appropriate support needs will improve  Outcome: Ongoing     Problem: Health Behavior:  Goal: Ability to manage health-related needs will improve  Description: Ability to manage health-related needs will improve  Outcome: Ongoing     Problem: Physical Regulation:  Goal: Signs of adequate cerebral perfusion will increase  Description: Signs of adequate cerebral perfusion will increase  Outcome: Ongoing  Goal: Ability to maintain a stable neurologic state will improve  Description: Ability to maintain a stable neurologic state will improve  Outcome: Ongoing     Problem: Safety:  Goal: Ability to remain free from injury will improve  Description: Ability to remain free from injury will improve  Outcome: Ongoing     Problem: Self-Concept:  Goal: Level of anxiety will decrease  Description: Level of anxiety will decrease  Outcome: Ongoing  Goal: Ability to verbalize feelings about condition will improve  Description: Ability to verbalize feelings about condition will improve  Outcome: Ongoing    No seizure like activity this shift. Remains free from injury. Safety precautions in place. Fall precatiouns in place. Problem: Pain:  Goal: Pain level will decrease  Description: Pain level will decrease  Outcome: Ongoing  Goal: Control of chronic pain  Description: Control of chronic pain  Outcome: Ongoing    Pain level assessment complete. Pt rated pain at 8/10. Pt educated on pain scale and control interventions. PRN pain medication given per pt request. Pt instructed to call out with new onset of pain or unrelieved pain. Will continue to monitor. Problem: Skin Integrity:  Goal: Will show no infection signs and symptoms  Description: Will show no infection signs and symptoms  Outcome: Ongoing  Goal: Absence of new skin breakdown  Description: Absence of new skin breakdown  Outcome: Ongoing    Turned every 2 hours.

## 2021-07-23 NOTE — PROGRESS NOTES
[facility]  Office: [address], DO, Andrew Aminf, DO, Yaya Echavarria, DO, Jennifer Sonia Blood, DO, Frankie Dietrich MD, Meenakshi Matthew MD, Erasmo Venegas MD, Juve Dutton MD, Juan Strong MD, Leslie Hernandez MD, Khadra Friend MD, Davian Hutchison, DO, Cyndie Brand MD, Xiang Chen, DO, Barbara Eagle MD,  Ajith Guerrero, DO, Glenny Strauss MD, Loy Miranda MD, Stephanie Sahni MD, Maryellen Chávez MD, Augustus Lehman MD, Jolynn Toro MD, Jose Luevano [facility], [facility], CNP, Fina Dailey, CNP, Wilmar Jean-Baptiste, CNS, Jaren Monzon, Paul Chun, CNP, Shekhar Rivera, CNP, Marianna Martino, CNP, Camillo Schaumann, CNP, Glen Zapata PA-C, Kojo iDetrich [facility], Shanthi Mena, CNP, Justus Gomez, [facility], Brook Quintero, CNP, Donnell Ojeda, CNP, Ankur Maldonado, CNP, Elena Sandoval CNP, Gladies Kocher, [facility], Alban Mcnulty, [address] [place] [address]    Progress Note    7/23/2021    3:19 PM    Name:   Dallin Salinas  MRN:     [id number]     Acct:      [de-identified]   Room:   [address] [place] [address] Day:  6  Admit Date:  7/12/2021  7:15 PM    PCP:   CHRISTIANNE Alejandro CNP  Code Status:  Full Code    Subjective:     C/C: weakness  Interval History Status: worsened. As patient was evaluated bedside, patient was noted to be altered, not getting up with sternal rub. Neurosurgery was bedside, patient had similar episode in the morning around 4 AM when patient was not able to wake up. Vitals have been stable, patient is maintaining her airway. Stat ABG and ammonia levels were ordered. RN was told to inform primary    Brief History: This is an 66-year-old female who was initially transferred to our hospital for management of right thalamic hemorrhage and hypertensive emergency. Internal medicine was consulted for management of her antihypertensive regimen.  Medical history significant for obstructive sleep apnea, hypertension, depression, hyperlipidemia, vitamin D deficiency. Review of Systems:   Review of systems not able to assess as patient is altered     Medications: Allergies: Allergies   Allergen Reactions    Clonidine Derivatives     Morphine     Sulfa Antibiotics        Current Meds:   Scheduled Meds:    [START ON 7/24/2021] spironolactone  50 mg Oral Daily    divalproex  250 mg Oral 2 times per day    gabapentin  300 mg Oral BID    QUEtiapine  25 mg Oral Nightly    magnesium oxide  400 mg Oral Daily    cholestyramine light  4 g Oral BID    amLODIPine  10 mg Oral Daily    enoxaparin  40 mg Subcutaneous Daily    losartan  100 mg Oral Daily    And    hydroCHLOROthiazide  25 mg Oral Daily    sodium chloride flush  5-40 mL Intravenous 2 times per day     Continuous Infusions:    sodium chloride       PRN Meds: diclofenac sodium, butalbital-acetaminophen-caffeine, diphenoxylate-atropine, labetalol, sodium chloride flush, sodium chloride, acetaminophen, ondansetron **OR** ondansetron    Data:     Past Medical History:   has a past medical history of Anemia, unspecified, Basal cell carcinoma, Depression, Disp fx of fifth metatarsal bone of right foot with routine healing, Hyperlipidemia, Hypertension, 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 Social History:   reports that she has never smoked. She has never used smokeless tobacco. She reports current alcohol use. She reports that she does not use drugs.      Family History:   Family History   Problem Relation Age of Onset    Diabetes Mother     Stroke Father     Heart Disease Father        Vitals:  /66   Pulse 72   Temp 98.3 °F (36.8 °C) (Axillary)   Resp 14   Ht 4' 9\" (1.448 m)   Wt 141 lb 15.6 oz (64.4 kg) SpO2 96%   BMI 30.72 kg/m²   Temp (24hrs), Av.2 °F (36.8 °C), Min:98 °F (36.7 °C), Max:98.3 °F (36.8 °C)    No results for input(s): POCGLU in the last 72 hours. I/O (24Hr): Intake/Output Summary (Last 24 hours) at 2021 1519  Last data filed at 2021 0600  Gross per 24 hour   Intake 10 ml   Output 801 ml   Net -791 ml       Labs:  Hematology:  Recent Labs     21  0652 21  0008 21  1123   WBC 11.5* 14.2* 12.1*   RBC 4.66 4.94 4.89   HGB 13.9 14.6 14.7   HCT 42.6 45.6 43.8   MCV 91.4 92.3 89.6   MCH 29.8 29.6 30.1   MCHC 32.6 32.0 33.6   RDW 13.2 13.0 12.9    329 360   MPV 9.7 10.1 9.6     Chemistry:  Recent Labs     21  0652 21  0008 21  1123   * 131* 132*   K 3.6* 3.7 3.4*   CL 96* 97* 95*   CO2 21 20 26   GLUCOSE 152* 149* 156*   BUN 33* 29* 28*   CREATININE 0.54 0.48* 0.59   MG  --   --  2.3   ANIONGAP 16 14 11   LABGLOM >60 >60 >60   GFRAA >60 >60 >60   CALCIUM 8.6 8.7 8.7     Recent Labs     21  1123   AMMONIA 70*     ABG:  Lab Results   Component Value Date    POCPH 7.472 2021    POCPCO2 37.3 2021    POCPO2 78.5 2021    POCHCO3 27.3 2021    NBEA NOT REPORTED 2021    PBEA 4 2021    TDT5LKT NOT REPORTED 2021    JEMV7ABT 96 2021    FIO2 32.0 2021     Lab Results   Component Value Date/Time    SPECIAL R HAND 20ML 2021 07:55 AM     Lab Results   Component Value Date/Time    CULTURE NO GROWTH 2 DAYS 2021 07:55 AM       Radiology:  XR CHEST (SINGLE VIEW FRONTAL)    Result Date: 2021  New mild streaky right infrahilar atelectasis     CT HEAD WO CONTRAST    Result Date: 2021  No significant interval change in right thalamic hemorrhage with interventricular extension. CT HEAD WO CONTRAST    Result Date: 2021  1. Stable volume and extent of acute intraparenchymal hemorrhage involving the mid to posterior right thalamus.  2. Unchanged distribution of acute intraventricular hemorrhage within the bilateral lateral ventricles, as discussed above. 3. No new acute intracranial hemorrhage. 4. Severe cerebral white matter chronic microvascular ischemic disease. 5. Posterior left insular white matter 15 mm diameter small remote infarction. CT HEAD WO CONTRAST    Result Date: 7/22/2021  Right thalamic intraparenchymal and intraventricular hemorrhage demonstrate appropriate interval evolution. No evidence of interval hemorrhage. Mild dilatation of the lateral ventricles has not changed significantly since prior study. Fluoroscopy modified barium swallow with video    Result Date: 7/19/2021  Deep laryngeal penetration of thick liquid. No aspiration. Please see separate speech pathology report for full discussion of findings and recommendations. Physical Examination:        General appearance: Drowsy, lethargic  Mental Status: Not oriented  Lungs:  clear to auscultation bilaterally, normal effort  Heart:  regular rate and rhythm, no murmur  Abdomen:  soft, nontender, nondistended, normal bowel sounds, no masses, hepatomegaly, splenomegaly  Extremities:  no edema, redness, tenderness in the calves  Skin:  no gross lesions, rashes, induration  Neurological statusnot able to follow commands to check sensory or motor abnormality  Muscle bulk normal  Assessment:        Hospital Problems         Last Modified POA    Thalamic hemorrhage with stroke ([address] 7/13/2021 Yes    Intracranial hemorrhage ([address] 7/13/2021 Yes    Hypertensive emergency 7/13/2021 Yes    IVH (intraventricular hemorrhage) ([address] 7/13/2021 Yes    Acute left hemiparesis ([address] 7/13/2021 Yes          Plan:        Essential hypertension continue Norvasc, Cozaar, discontinue hydralazine , hydrochlorothiazide, and increase Aldactone to 50 mg.   Continue to check BMP    Encephalopathy secondary to right thalamic hemorrhage with stroke  ammonia level elevated, check LFT, repeat CT Head Ordered by neurology, valproic levels, stat ABG, evaluate valproic acid by neurology.     Hyperglycemia A1c of 6.3, continue to monitor blood sugar, stable    Hyponatremia with hypokalemiadiscontinue hydrochlorothiazide, replace potassium to keep K>4, keep mg>2    Suicidal ideationpsychiatry reassessment ordered by neuro    Obesity recommend weight loss    DVT prophylaxis Lovenox    SLP kay  Pt/ot    Van Hwang MD  7/23/2021  3:19 PM

## 2021-07-23 NOTE — PROGRESS NOTES
Comprehensive Nutrition Assessment    Type and Reason for Visit:  Reassess    Nutrition Recommendations/Plan:   - Continue Dysphagia Minced and Moist Diet with Mildly Thick (nectar) Liquids as tolerated  - Discontinue frozen oral nutrition supplements (Magic Cup)  - Start fortified pudding (Boost Pudding) and provide 3x/d  - Monitor/encourage PO intakes     Nutrition Assessment:  Spoke with RN who reports that patient does not like the frozen oral nutrition supplements [facility]) and has not been eating them. Will trial fortified pudding (Boost Pudding) instead. Patient still c/o poor appetite and has been eating < 75% of meals. Labs/Meds reviewed. Malnutrition Assessment:  Malnutrition Status: At risk for malnutrition (Comment)    Context:  Acute Illness     Findings of the 6 clinical characteristics of malnutrition:  Energy Intake:  Mild decrease in energy intake (Comment)  Weight Loss:  Unable to assess     Body Fat Loss:  No significant body fat loss     Muscle Mass Loss:  No significant muscle mass loss    Fluid Accumulation:  1 - Mild Generalized   Strength:  Not Performed    Estimated Daily Nutrient Needs:  Energy (kcal):  7296-9240 kcal/day; Weight Used for Energy Requirements:  Ideal     Protein (g):  40-50 g/day protein; Weight Used for Protein Requirements:  Ideal (1.0-1.2 g/kg)        Fluid (ml/day):  1.7 L (or per MD discretion); Method Used for Fluid Requirements:  Other (Comment) (Neha Aden)      Nutrition Related Findings:  Labs: Na 132 (L), K 3.4 (L). Meds reviewed. Last BM 7/22. Hypoactive bowel sounds. Trace generalized edema. Wounds:  None       Current Nutrition Therapies:    Adult Oral Nutrition Supplement; Frozen Oral Supplement  ADULT DIET;  Dysphagia - Minced and Moist; Mildly Thick (Nectar)    Anthropometric Measures:  · Height: 4' 9\" (144.8 cm)  · Current Body Weight: 141 lb 15.6 oz (64.4 kg)   · Ideal Body Weight: 85 lbs; % Ideal Body Weight     · BMI: 30.7  · BMI Categories: Obese Class 1 (BMI 30.0-34. 9)       Nutrition Diagnosis:   · Predicted inadequate energy intake related to cognitive or neurological impairment, swallowing difficulty (lethargic) as evidenced by intake 26-50%, swallow study results    Nutrition Interventions:   Food and/or Nutrient Delivery:  Continue Current Diet, Modify Oral Nutrition Supplement  Nutrition Education/Counseling:  No recommendation at this time   Coordination of Nutrition Care:  Continue to monitor while inpatient    Goals:  Meet >50% of estimated nutrient needs       Nutrition Monitoring and Evaluation:   Behavioral-Environmental Outcomes:  None Identified   Food/Nutrient Intake Outcomes:  Food and Nutrient Intake, Supplement Intake  Physical Signs/Symptoms Outcomes:  Biochemical Data, Nutrition Focused Physical Findings, Skin, Weight, Chewing or Swallowing, GI Status     Discharge Planning:     Too soon to determine     Electronically signed by Jessica Glover RD, LD on 7/23/21 at 3:15 PM EDT    Contact: [phone]

## 2021-07-23 NOTE — PLAN OF CARE
Problem: Falls - Risk of:  Goal: Will remain free from falls  Description: Will remain free from falls  7/23/2021 1158 by Orlin Bahena RN  Outcome: Met This Shift  7/23/2021 0533 by Rohna Mitchell RN  Outcome: Ongoing  Goal: Absence of physical injury  Description: Absence of physical injury  7/23/2021 1158 by Orlin Bahena RN  Outcome: Met This Shift  7/23/2021 0533 by Rhona Mitchell RN  Outcome: Ongoing     Problem: HEMODYNAMIC STATUS  Goal: Patient has stable vital signs and fluid balance  7/23/2021 1158 by Orlin Bahena RN  Outcome: Met This Shift  7/23/2021 0533 by Rhona Mitchell RN  Outcome: Ongoing     Problem: Safety:  Goal: Ability to remain free from injury will improve  Description: Ability to remain free from injury will improve  7/23/2021 1158 by Orlin Bahena RN  Outcome: Met This Shift  7/23/2021 0533 by Rhona Mitchell RN  Outcome: Ongoing     Problem: Skin Integrity:  Goal: Will show no infection signs and symptoms  Description: Will show no infection signs and symptoms  7/23/2021 1158 by Orlin Bahena RN  Outcome: Met This Shift  7/23/2021 0533 by Rhona Mitchell RN  Outcome: Ongoing  Goal: Absence of new skin breakdown  Description: Absence of new skin breakdown  7/23/2021 1158 by Orlin Bahena RN  Outcome: Met This Shift  7/23/2021 0533 by Rhona Mitchell RN  Outcome: Ongoing     Problem: COMMUNICATION IMPAIRMENT  Goal: Ability to express needs and understand communication  7/23/2021 1158 by Orlin Bahena RN  Outcome: Ongoing  7/23/2021 0533 by Rhona Mitchell RN  Outcome: Ongoing     Problem: Coping:  Goal: Ability to cope will improve  Description: Ability to cope will improve  7/23/2021 1158 by Orlin Bahena RN  Outcome: Ongoing  7/23/2021 0533 by Rhona Mitchell RN  Outcome: Ongoing  Goal: Ability to identify appropriate support needs will improve  Description: Ability to identify appropriate support needs will improve  7/23/2021 1158 by Orlin Bahena RN  Outcome: Ongoing  7/23/2021 0533 by Rhona Mitchell, RN  Outcome: Ongoing     Problem: Health Behavior:  Goal: Ability to manage health-related needs will improve  Description: Ability to manage health-related needs will improve  7/23/2021 0533 by Tamika Decker RN  Outcome: Ongoing     Problem: Physical Regulation:  Goal: Signs of adequate cerebral perfusion will increase  Description: Signs of adequate cerebral perfusion will increase  7/23/2021 0533 by Tamika Decker RN  Outcome: Ongoing  Goal: Ability to maintain a stable neurologic state will improve  Description: Ability to maintain a stable neurologic state will improve  7/23/2021 0533 by Tamika Decker RN  Outcome: Ongoing     Problem: Self-Concept:  Goal: Level of anxiety will decrease  Description: Level of anxiety will decrease  7/23/2021 0533 by Tamika Decker RN  Outcome: Ongoing  Goal: Ability to verbalize feelings about condition will improve  Description: Ability to verbalize feelings about condition will improve  7/23/2021 0533 by Tamika Decker RN  Outcome: Ongoing     Problem: Pain:  Goal: Pain level will decrease  Description: Pain level will decrease  7/23/2021 1158 by Kapil Raygoza RN  Outcome: Ongoing  7/23/2021 0533 by Tamika Decker RN  Outcome: Ongoing  Goal: Control of acute pain  Description: Control of acute pain  Outcome: Ongoing  Goal: Control of chronic pain  Description: Control of chronic pain  7/23/2021 1158 by Kapil Raygoza RN  Outcome: Ongoing  7/23/2021 0533 by Tamika Decker RN  Outcome: Ongoing     Problem: Suicide risk  Goal: Provide patient with safe environment  Description: Provide patient with safe environment  Outcome: Ongoing     Problem: ACTIVITY INTOLERANCE/IMPAIRED MOBILITY  Goal: Mobility/activity is maintained at optimum level for patient  7/23/2021 1158 by Kapil Raygoza RN  Outcome: Not Met This Shift  7/23/2021 0533 by Tamika Decker RN  Outcome: Ongoing

## 2021-07-24 PROBLEM — J34.89 FRONTAL SINUS PAIN: Status: ACTIVE | Noted: 2021-07-24

## 2021-07-24 LAB
ABSOLUTE EOS #: 0.1 K/UL (ref 0–0.44)
ABSOLUTE IMMATURE GRANULOCYTE: 0.07 K/UL (ref 0–0.3)
ABSOLUTE LYMPH #: 1.77 K/UL (ref 1.1–3.7)
ABSOLUTE MONO #: 1.13 K/UL (ref 0.1–1.2)
AMMONIA: 17 UMOL/L (ref 11–51)
BASOPHILS # BLD: 0 % (ref 0–2)
BASOPHILS ABSOLUTE: <0.03 K/UL (ref 0–0.2)
DIFFERENTIAL TYPE: ABNORMAL
EOSINOPHILS RELATIVE PERCENT: 1 % (ref 1–4)
HCT VFR BLD CALC: 44.3 % (ref 36.3–47.1)
HEMOGLOBIN: 14.7 G/DL (ref 11.9–15.1)
IMMATURE GRANULOCYTES: 1 %
LYMPHOCYTES # BLD: 17 % (ref 24–43)
MCH RBC QN AUTO: 29.9 PG (ref 25.2–33.5)
MCHC RBC AUTO-ENTMCNC: 33.2 G/DL (ref 28.4–34.8)
MCV RBC AUTO: 90.2 FL (ref 82.6–102.9)
MONOCYTES # BLD: 11 % (ref 3–12)
NRBC AUTOMATED: 0 PER 100 WBC
PDW BLD-RTO: 13 % (ref 11.8–14.4)
PLATELET # BLD: 339 K/UL (ref 138–453)
PLATELET ESTIMATE: ABNORMAL
PMV BLD AUTO: 9.6 FL (ref 8.1–13.5)
RBC # BLD: 4.91 M/UL (ref 3.95–5.11)
RBC # BLD: ABNORMAL 10*6/UL
SEG NEUTROPHILS: 70 % (ref 36–65)
SEGMENTED NEUTROPHILS ABSOLUTE COUNT: 7.15 K/UL (ref 1.5–8.1)
WBC # BLD: 10.2 K/UL (ref 3.5–11.3)
WBC # BLD: ABNORMAL 10*3/UL

## 2021-07-24 PROCEDURE — 85025 COMPLETE CBC W/AUTO DIFF WBC: CPT

## 2021-07-24 PROCEDURE — 2060000000 HC ICU INTERMEDIATE R&B

## 2021-07-24 PROCEDURE — 99232 SBSQ HOSP IP/OBS MODERATE 35: CPT | Performed by: STUDENT IN AN ORGANIZED HEALTH CARE EDUCATION/TRAINING PROGRAM

## 2021-07-24 PROCEDURE — 6370000000 HC RX 637 (ALT 250 FOR IP): Performed by: FAMILY MEDICINE

## 2021-07-24 PROCEDURE — 6370000000 HC RX 637 (ALT 250 FOR IP): Performed by: PSYCHIATRY & NEUROLOGY

## 2021-07-24 PROCEDURE — 6370000000 HC RX 637 (ALT 250 FOR IP): Performed by: NURSE PRACTITIONER

## 2021-07-24 PROCEDURE — 6370000000 HC RX 637 (ALT 250 FOR IP): Performed by: STUDENT IN AN ORGANIZED HEALTH CARE EDUCATION/TRAINING PROGRAM

## 2021-07-24 PROCEDURE — 99232 SBSQ HOSP IP/OBS MODERATE 35: CPT | Performed by: PSYCHIATRY & NEUROLOGY

## 2021-07-24 PROCEDURE — 82140 ASSAY OF AMMONIA: CPT

## 2021-07-24 PROCEDURE — 6360000002 HC RX W HCPCS: Performed by: NURSE PRACTITIONER

## 2021-07-24 PROCEDURE — 2580000003 HC RX 258: Performed by: PSYCHIATRY & NEUROLOGY

## 2021-07-24 PROCEDURE — 51702 INSERT TEMP BLADDER CATH: CPT

## 2021-07-24 PROCEDURE — 92610 EVALUATE SWALLOWING FUNCTION: CPT

## 2021-07-24 PROCEDURE — 36415 COLL VENOUS BLD VENIPUNCTURE: CPT

## 2021-07-24 RX ORDER — OXYCODONE HYDROCHLORIDE AND ACETAMINOPHEN 5; 325 MG/1; MG/1
1 TABLET ORAL ONCE
Status: COMPLETED | OUTPATIENT
Start: 2021-07-24 | End: 2021-07-24

## 2021-07-24 RX ORDER — FLUTICASONE PROPIONATE 50 MCG
1 SPRAY, SUSPENSION (ML) NASAL DAILY
Status: DISCONTINUED | OUTPATIENT
Start: 2021-07-24 | End: 2021-07-26

## 2021-07-24 RX ADMIN — QUETIAPINE FUMARATE 25 MG: 25 TABLET ORAL at 21:10

## 2021-07-24 RX ADMIN — SODIUM CHLORIDE, PRESERVATIVE FREE 10 ML: 5 INJECTION INTRAVENOUS at 21:11

## 2021-07-24 RX ADMIN — ACETAMINOPHEN 650 MG: 325 TABLET ORAL at 02:27

## 2021-07-24 RX ADMIN — DIVALPROEX SODIUM 250 MG: 250 TABLET, DELAYED RELEASE ORAL at 08:54

## 2021-07-24 RX ADMIN — CHOLESTYRAMINE 4 G: 4 POWDER, FOR SUSPENSION ORAL at 21:10

## 2021-07-24 RX ADMIN — LOSARTAN POTASSIUM 100 MG: 50 TABLET, FILM COATED ORAL at 08:54

## 2021-07-24 RX ADMIN — DIVALPROEX SODIUM 250 MG: 250 TABLET, DELAYED RELEASE ORAL at 21:11

## 2021-07-24 RX ADMIN — OXYCODONE HYDROCHLORIDE AND ACETAMINOPHEN 1 TABLET: 5; 325 TABLET ORAL at 10:42

## 2021-07-24 RX ADMIN — OXYCODONE HYDROCHLORIDE AND ACETAMINOPHEN 1 TABLET: 5; 325 TABLET ORAL at 04:06

## 2021-07-24 RX ADMIN — GABAPENTIN 300 MG: 300 CAPSULE ORAL at 21:11

## 2021-07-24 RX ADMIN — MAGNESIUM GLUCONATE 500 MG ORAL TABLET 400 MG: 500 TABLET ORAL at 08:54

## 2021-07-24 RX ADMIN — BUTALBITAL, ACETAMINOPHEN AND CAFFEINE 1 TABLET: 50; 325; 40 TABLET ORAL at 08:55

## 2021-07-24 RX ADMIN — ENOXAPARIN SODIUM 40 MG: 40 INJECTION SUBCUTANEOUS at 08:54

## 2021-07-24 RX ADMIN — CHOLESTYRAMINE 4 G: 4 POWDER, FOR SUSPENSION ORAL at 08:55

## 2021-07-24 RX ADMIN — AMLODIPINE BESYLATE 10 MG: 10 TABLET ORAL at 08:54

## 2021-07-24 RX ADMIN — GABAPENTIN 300 MG: 300 CAPSULE ORAL at 08:54

## 2021-07-24 RX ADMIN — SPIRONOLACTONE 50 MG: 25 TABLET ORAL at 08:54

## 2021-07-24 ASSESSMENT — PAIN DESCRIPTION - DESCRIPTORS
DESCRIPTORS: ACHING
DESCRIPTORS: ACHING

## 2021-07-24 ASSESSMENT — PAIN DESCRIPTION - FREQUENCY
FREQUENCY: CONTINUOUS
FREQUENCY: CONTINUOUS

## 2021-07-24 ASSESSMENT — PAIN SCALES - GENERAL
PAINLEVEL_OUTOF10: 3
PAINLEVEL_OUTOF10: 0
PAINLEVEL_OUTOF10: 10

## 2021-07-24 ASSESSMENT — ENCOUNTER SYMPTOMS
SINUS PRESSURE: 1
COUGH: 0
TACHYPNEA: 1
SHORTNESS OF BREATH: 0
ABDOMINAL PAIN: 0
EYE DISCHARGE: 0
ABDOMINAL DISTENTION: 0
BACK PAIN: 0
COLOR CHANGE: 0

## 2021-07-24 ASSESSMENT — PAIN DESCRIPTION - ONSET
ONSET: ON-GOING
ONSET: ON-GOING

## 2021-07-24 ASSESSMENT — PAIN DESCRIPTION - PAIN TYPE
TYPE: CHRONIC PAIN
TYPE: CHRONIC PAIN

## 2021-07-24 ASSESSMENT — PAIN DESCRIPTION - LOCATION
LOCATION: HEAD;NECK
LOCATION: HEAD;NECK

## 2021-07-24 NOTE — PROGRESS NOTES
Consent obtained with Norman TOM Rn related to patient not able to sign, faxed to [facility].

## 2021-07-24 NOTE — PLAN OF CARE
Problem: Falls - Risk of:  Goal: Will remain free from falls  Description: Will remain free from falls  7/24/2021 1850 by Hubert Muñoz RN  Outcome: Ongoing  7/24/2021 0648 by Babita Covarrubias RN  Outcome: Ongoing  Goal: Absence of physical injury  Description: Absence of physical injury  7/24/2021 1850 by Hubert Muñoz RN  Outcome: Ongoing  7/24/2021 0648 by Babita Covarrubias RN  Outcome: Ongoing     Problem: HEMODYNAMIC STATUS  Goal: Patient has stable vital signs and fluid balance  7/24/2021 1850 by Hubert Muñoz RN  Outcome: Ongoing  7/24/2021 0648 by Babita Covarrubias RN  Outcome: Ongoing     Problem: ACTIVITY INTOLERANCE/IMPAIRED MOBILITY  Goal: Mobility/activity is maintained at optimum level for patient  7/24/2021 1850 by Hubert Muñoz RN  Outcome: Ongoing  7/24/2021 0648 by Babita Covarrubias RN  Outcome: Ongoing     Problem: COMMUNICATION IMPAIRMENT  Goal: Ability to express needs and understand communication  7/24/2021 1850 by Hubert Muñoz RN  Outcome: Ongoing  7/24/2021 0648 by Babita Covarrubias RN  Outcome: Ongoing     Problem: Coping:  Goal: Ability to cope will improve  Description: Ability to cope will improve  7/24/2021 1850 by Hubert Muñoz RN  Outcome: Ongoing  7/24/2021 0648 by Babita Covarrubias RN  Outcome: Ongoing  Goal: Ability to identify appropriate support needs will improve  Description: Ability to identify appropriate support needs will improve  7/24/2021 1850 by Hubert Muñoz RN  Outcome: Ongoing  7/24/2021 0648 by Babita Cvoarrubias RN  Outcome: Ongoing     Problem: Health Behavior:  Goal: Ability to manage health-related needs will improve  Description: Ability to manage health-related needs will improve  7/24/2021 1850 by Hubert Muñoz RN  Outcome: Ongoing  7/24/2021 0648 by Babita Covarrubias RN  Outcome: Ongoing     Problem: Physical Regulation:  Goal: Signs of adequate cerebral perfusion will increase  Description: Signs of adequate cerebral perfusion will increase  7/24/2021 1850 by Monica Dalton RN  Outcome: Ongoing  7/24/2021 0648 by Mame Sousa RN  Outcome: Ongoing  Goal: Ability to maintain a stable neurologic state will improve  Description: Ability to maintain a stable neurologic state will improve  7/24/2021 1850 by Monica Dalton RN  Outcome: Ongoing  7/24/2021 0648 by Mame Sousa RN  Outcome: Ongoing     Problem: Safety:  Goal: Ability to remain free from injury will improve  Description: Ability to remain free from injury will improve  7/24/2021 1850 by Monica Dalton RN  Outcome: Ongoing  7/24/2021 0648 by Mame Sousa RN  Outcome: Ongoing     Problem: Self-Concept:  Goal: Level of anxiety will decrease  Description: Level of anxiety will decrease  7/24/2021 1850 by Monica Dalotn RN  Outcome: Ongoing  7/24/2021 0648 by Mame Sousa RN  Outcome: Ongoing  Goal: Ability to verbalize feelings about condition will improve  Description: Ability to verbalize feelings about condition will improve  7/24/2021 1850 by Monica Dalton RN  Outcome: Ongoing  7/24/2021 0648 by Mame Sousa RN  Outcome: Ongoing     Problem: Pain:  Goal: Pain level will decrease  Description: Pain level will decrease  7/24/2021 1850 by Monica Dalton RN  Outcome: Ongoing  7/24/2021 0648 by Mame Sousa RN  Outcome: Ongoing  Goal: Control of acute pain  Description: Control of acute pain  Outcome: Ongoing  Goal: Control of chronic pain  Description: Control of chronic pain  7/24/2021 1850 by Monica Dalton RN  Outcome: Ongoing  7/24/2021 0648 by Mame Sousa RN  Outcome: Ongoing     Problem: Nutrition  Goal: Optimal nutrition therapy  Outcome: Ongoing     Problem: Skin Integrity:  Goal: Will show no infection signs and symptoms  Description: Will show no infection signs and symptoms  7/24/2021 1850 by Monica Dalton RN  Outcome: Ongoing  7/24/2021 0648 by Mame Sousa RN  Outcome: Ongoing  Goal: Absence of new skin breakdown  Description: Absence of new skin breakdown  7/24/2021 1850 by Vikram Nguyen RN  Outcome: Ongoing  7/24/2021 0648 by Amelia Arevalo RN  Outcome: Ongoing     Problem: Suicide risk  Goal: Provide patient with safe environment  Description: Provide patient with safe environment  7/24/2021 1850 by Vikram Nguyen RN  Outcome: Ongoing  7/24/2021 0648 by Amelia Arevalo RN  Outcome: Ongoing

## 2021-07-24 NOTE — PROGRESS NOTES
[facility]  Office: [address], DO, Rosalio Connie, DO, Elena Nilay, DO, Emma Personl Blood, DO, Lucho Frey MD, Glenny Denny MD, Darryle So, MD, Barbara Hurtado MD, Faustino Chance MD, Jeison Bess MD, Josie Coyle MD, Hortencia Rincon, DO, Karly Cohen MD, Ketty Ryder DO, Aline Lyons MD,  Karoline Muñiz DO, Miguelina Decker MD, Oleg De Leon MD, Israel Jurado MD, Jesús Randall MD, Kwasi Cheng MD, Augustina Henry MD, Julee Romero, [facility], [facility], CNP, Vivek Cade, CNP, Keon Garcia, CNS, Dano Arriaga, CNP, Sharan Mccullough, CNP, Rohan Pate, CNP, Porfirio Goddard, CNP, Yao Tom, CNP, Meche Brandon PA-C, Laura Jones, J CARLOS, Rashard Parr, CNP, Odalis Grace, CNP, Gurinder Hylton, CNP, Mary Ferreira, CNP, Lizeth Calixto, CNP, Tawana Rubinstein, CNP, Alton Resendiz, CNP, Ilana Short, [facility]    Progress Note    7/24/2021    4:15 PM    Name:   Ross Nguyen  MRN:     [id number]     Acct:      [de-identified]   Room:   [address] [place] [address] Day:  12  Admit Date:  7/12/2021  7:15 PM    PCP:   Burgos Mixer, APRN - CNP  Code Status:  Full Code    Subjective:     C/C: weakness  Interval History Status: imrpoved    Patient is much more awake today  Ate in the morning  Appetite is imroving  Complains of frontal headache more on the rt side  Sitter at the bedside    Brief History: This is an 22-year-old female who was initially transferred to our hospital for management of right thalamic hemorrhage and hypertensive emergency. Internal medicine was consulted for management of her antihypertensive regimen. Medical history significant for obstructive sleep apnea, hypertension, depression, hyperlipidemia, vitamin D deficiency. Review of Systems:   Review of Systems   Constitutional: Positive for activity change and fatigue. Negative for chills and fever. HENT: Positive for sinus pressure.  Negative She has never used smokeless tobacco. She reports current alcohol use. She reports that she does not use drugs. Family History:   Family History   Problem Relation Age of Onset    Diabetes Mother     Stroke Father     Heart Disease Father        Vitals:  /79   Pulse 91   Temp 97.8 °F (36.6 °C) (Axillary)   Resp 18   Ht 4' 9\" (1.448 m)   Wt 141 lb 15.6 oz (64.4 kg)   SpO2 93%   BMI 30.72 kg/m²   Temp (24hrs), Av °F (36.7 °C), Min:97.5 °F (36.4 °C), Max:98.3 °F (36.8 °C)    No results for input(s): POCGLU in the last 72 hours. I/O (24Hr):     Intake/Output Summary (Last 24 hours) at 2021 1615  Last data filed at 2021 1445  Gross per 24 hour   Intake    Output 1300 ml   Net -1300 ml       Labs:  Hematology:  Recent Labs     21  0008 21  1123 21  0520   WBC 14.2* 12.1* 10.2   RBC 4.94 4.89 4.91   HGB 14.6 14.7 14.7   HCT 45.6 43.8 44.3   MCV 92.3 89.6 90.2   MCH 29.6 30.1 29.9   MCHC 32.0 33.6 33.2   RDW 13.0 12.9 13.0    360 339   MPV 10.1 9.6 9.6     Chemistry:  Recent Labs     21  0652 21  0008 21  1123   * 131* 132*   K 3.6* 3.7 3.4*   CL 96* 97* 95*   CO2 21 20 26   GLUCOSE 152* 149* 156*   BUN 33* 29* 28*   CREATININE 0.54 0.48* 0.59   MG  --   --  2.3   ANIONGAP 16 14 11   LABGLOM >60 >60 >60   GFRAA >60 >60 >60   CALCIUM 8.6 8.7 8.7     Recent Labs     21  1123 21  0520   PROT 6.3*  --    LABALBU 3.4*  --    AST 30  --    ALT 54*  --    ALKPHOS 70  --    BILITOT 0.66  --    BILIDIR 0.18  --    AMMONIA 70* 17     ABG:  Lab Results   Component Value Date    POCPH 7.472 2021    POCPCO2 37.3 2021    POCPO2 78.5 2021    POCHCO3 27.3 2021    NBEA NOT REPORTED 2021    PBEA 4 2021    NMG9BKX NOT REPORTED 2021    FGNY6CJC 96 2021    FIO2 32.0 2021     Lab Results   Component Value Date/Time    SPECIAL R HAND 20ML 2021 07:55 AM     Lab Results   Component Value Date/Time    CULTURE NO GROWTH 3 DAYS 07/21/2021 07:55 AM       Radiology:  XR CHEST (SINGLE VIEW FRONTAL)    Result Date: 7/20/2021  New mild streaky right infrahilar atelectasis     CT HEAD WO CONTRAST    Result Date: 7/23/2021  No significant interval change in right thalamic hemorrhage with interventricular extension. CT HEAD WO CONTRAST    Result Date: 7/23/2021  1. Stable volume and extent of acute intraparenchymal hemorrhage involving the mid to posterior right thalamus. 2. Unchanged distribution of acute intraventricular hemorrhage within the bilateral lateral ventricles, as discussed above. 3. No new acute intracranial hemorrhage. 4. Severe cerebral white matter chronic microvascular ischemic disease. 5. Posterior left insular white matter 15 mm diameter small remote infarction. CT HEAD WO CONTRAST    Result Date: 7/22/2021  Right thalamic intraparenchymal and intraventricular hemorrhage demonstrate appropriate interval evolution. No evidence of interval hemorrhage. Mild dilatation of the lateral ventricles has not changed significantly since prior study. Fluoroscopy modified barium swallow with video    Result Date: 7/19/2021  Deep laryngeal penetration of thick liquid. No aspiration. Please see separate speech pathology report for full discussion of findings and recommendations.        Physical Examination:        General appearance: awake, alert  Mental Status: Oriented to person and time  Lungs:  clear to auscultation bilaterally, normal effort  Heart:  regular rate and rhythm, no murmur  Abdomen:  soft, nontender, nondistended, normal bowel sounds, no masses, hepatomegaly, splenomegaly  Extremities:  no edema, redness, tenderness in the calves  Skin: Mole on the right arm   neurological status left lower extremity weakness    Assessment:        Hospital Problems         Last Modified POA    Thalamic hemorrhage with stroke ([address] 7/13/2021 Yes    Intracranial hemorrhage ([address] 7/13/2021 Yes Hypertensive emergency 7/13/2021 Yes    IVH (intraventricular hemorrhage) ([facility] 7/13/2021 Yes    Acute left hemiparesis ([facility] [address] [facility] 7/13/2021 Yes    Hyponatremia 7/23/2021 Yes    Hypokalemia 7/23/2021 Yes          Plan:        Essential hypertension continue Norvasc, Cozaar, Aldactone to 50 mg. Continue to check BMP    Encephalopathy secondary to right thalamic hemorrhage with stroke repeat ammonia normal, repeat CT Head Ordered by neurology, valproic levels lower, ABG reviewed. Rt frontal pain- likely secondary to hematoma, stroke, will start flonase, ct scan did not show major sinusitis    Hyperglycemia A1c of 6.3, continue to monitor blood sugar, stable    Hyponatremia with hypokalemiadiscontinue hydrochlorothiazide, replace potassium to keep K>4, keep mg>2. Check bmp tomorrow am.     Hypomagnesemia- on mg supplements    Suicidal ideationpsychiatry reassessment ordered by neuro. seroquel added.     Obesity recommend weight loss    DVT prophylaxis Lovenox    Pt/ot    Kavitha Alonso MD  7/24/2021  4:15 PM

## 2021-07-24 NOTE — PLAN OF CARE
Problem: Falls - Risk of:  Goal: Will remain free from falls  Description: Will remain free from falls  Outcome: Ongoing  Goal: Absence of physical injury  Description: Absence of physical injury  Outcome: Ongoing    Pt assessed as a fall risk this shift. Remains free from falls and accidental injury at this time. Fall precautions in place, including falling star sign. Floor free from obstacles, and bed is locked and in lowest position. Adequate lighting provided. Pt encouraged to call before getting Out Of Bed for any need. Will continue to monitor needs during hourly rounding, and reinforce education on use of call light. Problem: HEMODYNAMIC STATUS  Goal: Patient has stable vital signs and fluid balance  Outcome: Ongoing     Problem: ACTIVITY INTOLERANCE/IMPAIRED MOBILITY  Goal: Mobility/activity is maintained at optimum level for patient  Outcome: Ongoing     Problem: COMMUNICATION IMPAIRMENT  Goal: Ability to express needs and understand communication  Outcome: Ongoing     Neuro assessment completed, fall precautions in place, aspirations precautions in place, assess for barriers in communication and mobility, interventions to assist in communication and mobility in place, encouraged to call for assistance, adaptive devices used as needed, assess emotional state and support offered, encouraged patient to communicate by available means, and support systems included in patient care.      Problem: Coping:  Goal: Ability to cope will improve  Description: Ability to cope will improve  Outcome: Ongoing  Goal: Ability to identify appropriate support needs will improve  Description: Ability to identify appropriate support needs will improve  Outcome: Ongoing     Problem: Health Behavior:  Goal: Ability to manage health-related needs will improve  Description: Ability to manage health-related needs will improve  Outcome: Ongoing     Problem: Physical Regulation:  Goal: Signs of adequate cerebral perfusion will increase  Description: Signs of adequate cerebral perfusion will increase  Outcome: Ongoing  Goal: Ability to maintain a stable neurologic state will improve  Description: Ability to maintain a stable neurologic state will improve  Outcome: Ongoing     Problem: Safety:  Goal: Ability to remain free from injury will improve  Description: Ability to remain free from injury will improve  Outcome: Ongoing     Problem: Self-Concept:  Goal: Level of anxiety will decrease  Description: Level of anxiety will decrease  Outcome: Ongoing  Goal: Ability to verbalize feelings about condition will improve  Description: Ability to verbalize feelings about condition will improve  Outcome: Ongoing    No seizure like activity this shift. Remains free from injury. Safety precautions in place. Fall precatiouns in place. Problem: Pain:  Goal: Pain level will decrease  Description: Pain level will decrease  Outcome: Ongoing  Goal: Control of chronic pain  Description: Control of chronic pain  Outcome: Ongoing    Pain level assessment complete. Pt rated pain at 10/10. Pt educated on pain scale and control interventions. PRN pain medication given per pt request. Pt instructed to call out with new onset of pain or unrelieved pain. Will continue to monitor. Problem: Skin Integrity:  Goal: Will show no infection signs and symptoms  Description: Will show no infection signs and symptoms  Outcome: Ongoing  Goal: Absence of new skin breakdown  Description: Absence of new skin breakdown  Outcome: Ongoing    Turning patient every 2 hours. Problem: Suicide risk  Goal: Provide patient with safe environment  Description: Provide patient with safe environment  Outcome: Ongoing    Suicide precautions in place and sitter at bedside.

## 2021-07-24 NOTE — PROGRESS NOTES
Speech Language Pathology  Facility/Department: [facility] NEURO   CLINICAL BEDSIDE SWALLOW EVALUATION    NAME: Nola Hallman  : 1940  MRN: [id number]    ADMISSION DATE: 2021     ADMITTING DIAGNOSIS: has Personal history of other malignant neoplasm of skin; Neoplasm of uncertain behavior of skin; Essential hypertension; Osteoarthritis; Chronic diarrhea; Thalamic hemorrhage with stroke ([address] Intracranial hemorrhage ([address] Hypertensive emergency; IVH (intraventricular hemorrhage) ([address] Acute left hemiparesis ([address] Hyponatremia; and Hypokalemia on their problem list.     ONSET DATE: 2021    Recent Chest Xray/CT of Chest:   21 XR Chest  New mild streaky right infrahilar atelectasis        Date of Eval: 2021  Evaluating Therapist: Bri Phillips SLP      Current Diet level:   Dysphagia II Moist and Minced  Mildly Thick Liquids (Nectar Thick)        Primary Complaint   Obi Washington is a 77 yo woman admitted 21 with right thalamic hemorrhage and IVH. She has been complaining of severe +10/10 frontal headaches (left worse than right) which has resulted in suicidal ideation. On 21, she c/o new sensory loss and there was increased drowsiness. Repeat CT of Head as follows:  1. Stable volume and extent of acute intraparenchymal hemorrhage involving   the mid to posterior right thalamus. 2. Unchanged distribution of acute intraventricular hemorrhage within the   bilateral lateral ventricles, as discussed above. 3. No new acute intracranial hemorrhage. 4. Severe cerebral white matter chronic microvascular ischemic disease. 5. Posterior left insular white matter 15 mm diameter small remote infarction     Pt dx with encephalopathy d/t elevated ammonia levels and reduced mental status    A Modified Barium Swallow Study (Video) was completed 21 which revealed pt was safe for swallowing pureed, soft solids and nectar thick liquids.  Pt was noted to have supraglottic penetration and aspiration of thin. A PO diet of Dysphagia II Moist and Minced and Mildly Thick Liquids (Nectar Thick) was recommended          IMPRESSIONS  1) Per RN & sitter, pt has not evidenced any difficulty swallowing current diet    2) Skilled observations reveal pt appears clinically able to safely swallow her medications in pureed, and her diet of Dysphagia II Moist and Minced and Mildly Thick Liquids (Nectar Thick).  No overt s/s of aspiration were observed    3) Review of medical records reveals pt is eating less than 75% of her meals and pt continues to c/o HA which is likely affecting her appetite/desire to eat            RECOMMENDATIONS  1) Continue current diet Dysphagia II Moist and Minced and Mildly Thick Liquids (Nectar Thick)    2) As headache pain recedes and pt's mental status improves, would advise repeat MBSS to determine safety for thin liquids    3) To continue per ST POC                 Pain:  Pain Assessment  Pain Assessment: 0-10  Pain Level: 10  Dominguez-Baker Pain Rating: No hurt  Patient's Stated Pain Goal: No pain  Pain Type: Chronic pain  Pain Location: Head, Neck  Pain Orientation: Posterior  Pain Radiating Towards: headache  Pain Descriptors: Aching  Pain Frequency: Continuous  Pain Onset: On-going  Clinical Progression: Not changed  Non-Pharmaceutical Pain Intervention(s): Distraction, Emotional support, Relaxation techniques, Repositioned, Rest  Response to Pain Intervention: Patient Satisfied  RASS Score: Alert and calm  PAINAD (Pain Assessment in Advance Dementia)  Breathing: normal  Negative Vocalization: occasional moan/groan, low speech, negative/disapproving quality  Facial Expression: smiling or inexpressive  Body Language: relaxed  Consolability: distracted or reassured by voice/touch  PAINAD Score: 2  Pain Assessment/FLACC  Pain Rating: FLACC (rest) - Face: no particular expression or smile  Pain Rating: FLACC (rest) - Legs: normal position or relaxed  Pain Rating: FLACC (rest) - Activity: lying quietly, normal position, moves easily  Pain Rating: FLACC (rest) - Cry: moans or whimpers, occasional complaint  Pain Rating: FLACC (rest) - Consolability: content, relaxed  Score: FLACC (rest): 1    Reason for Referral  Kirti Avina was referred for a bedside swallow evaluation to assess the efficiency of her swallow function, identify signs and symptoms of aspiration and make recommendations regarding safe dietary consistencies, effective compensatory strategies, and safe eating environment. Impression  Dysphagia Diagnosis: Mild to moderate pharyngeal stage dysphagia  Dysphagia Impression : per MBSS on 7/19/21 with +supraglottic penetration wtih cough. Clinically pt's swallow is [facility] for current diet (Dysphagia II moiste/minced) and mildly thick liquids (Nectar)  Dysphagia Outcome Severity Scale: Level 3: Moderate dysphagia- Total assisstance, supervision or strategies.  Two or more diet consistencies restricted     Treatment Plan  Requires SLP Intervention: Yes  Duration/Frequency of Treatment: per [facility]          Recommended Diet and Intervention  Diet Solids Recommendation: Dysphagia Minced and Moist (Dysphagia II)  Liquid Consistency Recommendation: Mildly Thick (Nectar)             Oral Phase Dysfunction  Oral Phase  Oral Phase: WFL (for current diet (Dysphagia II moiste/minced) and mildly thick liquids (Nectar))     Indicators of Pharyngeal Phase Dysfunction   Pharyngeal Phase  Pharyngeal Phase: WFL (for current diet (Dysphagia II moiste/minced) and mildly thick liquids (Nectar)    Prognosis  Prognosis  Prognosis for safe diet advancement: good  Individuals consulted  Consulted and agree with results and recommendations: Patient;RN    Education  Patient Education Response: Verbalizes understanding             Therapy Time  SLP Individual Minutes  Time In: 0845  Time Out: 0160  Minutes: 3253 [address]  7/24/2021 9:06 AM

## 2021-07-24 NOTE — PROGRESS NOTES
[facility]   [address] [place] [address]     PROGRESS NOTE   Date:                            7/22/2021  Patient name:              Malissa Vega  Date of admission:      7/12/2021  7:15 PM  MRN:                           [id number]  Account:                      [id number]  YOB: 1940  PCP:                            CHRISTIANNE Jean CNP  Room:                          [address] [place] [address]  Code Status:               Full Code     Subjective:      Patient seen and examined at the bedside. She is awake opens eyes on verbal stimuli. Cranial nerve examination 2 through 12 intact. Denies any suicidal ideation or thoughts or plan. Repeat CT scan on 7/23 showed no significant interval change in the right thalamic hemorrhage with intraventricular extension. At 10:20 AM 7/24, patient started complaining of moderate to severe headache and Percocet was given. Psych reconsulted for reevaluation prior to discharge. Plan to discharge to SNF or I. Bedside sitter noticed self talkative behaviour.        History of Present Illness:      The patient is a 78 y.o. Non- / non  female with history of migraine, HTN, BCC, HLD who presents with left hemiparesis and she is admitted to the hospital for the management of  Right thalamic hemorrhage. Patient is a transfer from NICU for placement and headache management. She initially presented from [facility] on 7/12 with complaint of sudden headache followed by dysarthria and acute left hemiparesis. 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 CTA showed no vascular pathology. Prior to presentation, patient had missed at least 3 days of her antihypertensive medication.      Patient was started on Cardene drip and admitted to MICU. Repeat head CT showed stable bleed.  During patient's inpatient course, she continued to have 10/10 frontal headaches worse on the left, typical of her usual headaches. She received treatment with Decadron IV 4 mg q 8 hr total of 3 days and completed on 7/16, and was tried on numerous medications Flexuril 5 mg, Mag oxide 250 mg po daily, Excedrin, Benadryl, Dilaudid and,  IV Mag, Morphine, Depo medrol without permanent relief. Elavil was recently added for headaches. Patient expressed to her doctor that she wanted to kill herself because of her headaches. She was seen by psychiatry who recommended admission to inpatient geriatric psych facility.      On my evaluation, patient is still suicidal with sitter by bedside. She stated that she \"wants to bang her head against the wall\" and end her life because of her headaches. When asked if anyone has visited her from her family or if there was anyone she could share her feelings with, the patient stated that her family has been up to visit recently, but she has not told them of her suicidal ideation because she does not want to hurt them.  Patient then expressed that she has not ended her life because she doesn't want to hurt her family.      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 with unspecified severity (HCC)      Nondisp fx of fourth metatarsal bone of right foot with routine heal      Obstructive sleep apnea      Osteoarthritis      Pain in right foot      Spinal stenosis      Surgical aftercare, neoplasm      Vitamin D deficiency              Past Surgical History:      Past Surgical History         Past Surgical History:   Procedure Laterality Date    APPENDECTOMY        CHOLECYSTECTOMY        HERNIA REPAIR        HYSTERECTOMY        TONSILLECTOMY        TUBAL LIGATION        Medications Prior to Admission:      Home Medications           Prior to Admission medications    Medication Sig Start Date End Date Taking? Authorizing Provider   cholestyramine (QUESTRAN) 4 GM/DOSE powder TAKE ONE SCOOP DISSOLVED IN A GLASS OF WATER TWICE A DAY AS NEEDED. 5/17/21     CHRISTIANNE Fortune CNP   NONFORMULARY Indications: Natural supplement to help with elevated BP STRICTION BP - 2 TABLETS 2 TIMES A DAY       Historical Provider, MD   Aspirin Buf,CaCarb-MgCarb-MgO, (BUFFERIN PO) Take 2 tablets by mouth 4 times daily as needed Per pt       Historical Provider, MD   Melatonin 10 MG TABS Take by mouth nightly as needed       Historical Provider, MD   Omega-3 Fatty Acids (FISH OIL PO)         Historical Provider, MD   losartan-hydroCHLOROthiazide (HYZAAR) 100-25 MG per tablet TAKE ONE TABLET DAILY. 2/12/21     CHRISTIANNE Fortune CNP   amLODIPine (NORVASC) 5 MG tablet TAKE ONE TABLET DAILY. 2/12/21     CHRISTIANNE Jerez CNP   UNABLE TO FIND CBD Oil per pt report       Historical Provider, MD   CALCIUM-MAGNESIUM-VITAMIN D PO         Historical Provider, MD   Potassium Gluconate 595 (99 K) MG TABS Take by mouth daily       Historical Provider, MD   Flaxseed, Linseed, (FLAXSEED OIL PO)         Historical Provider, MD   aspirin-acetaminophen-caffeine (EXCEDRIN MIGRAINE) 187-109-58 MG per tablet Take 2 tablets by mouth daily as needed for Headaches       Historical Provider, MD   Magnesium Oxide 250 MG TABS Take 1 tablet by mouth daily       Historical Provider, MD   ferrous sulfate 324 (65 Fe) MG EC tablet Take 324 mg by mouth daily (with breakfast)       Historical Provider, MD            Allergies:      Clonidine derivatives, Morphine, and Sulfa antibiotics     Social History:      Tobacco:    reports that she has never smoked. She has never used smokeless tobacco.  Alcohol:      reports current alcohol use.   Drug Use:  reports no history of drug use.     Family History:      Family History Family History   Problem Relation Age of Onset    Diabetes Mother      Stroke Father      Heart Disease Father              Review of Systems:      Review of Systems   Constitutional: Negative for activity change, appetite change, chills and fever. HENT: Negative for congestion. Respiratory: Negative for cough, chest tightness, shortness of breath and wheezing. Cardiovascular: Negative for chest pain and leg swelling. Gastrointestinal: Negative for abdominal distention, abdominal pain, diarrhea, nausea and vomiting. Genitourinary: Negative for dysuria and flank pain. Skin: Negative for rash. Neurological: Positive for speech difficulty, weakness and headaches. Negative for dizziness and numbness. Psychiatric/Behavioral: Positive for self-injury, sleep disturbance. Moderate confusion.       Physical Exam:   BP (!) 171/89   Pulse 92   Temp 97.4 °F (36.3 °C) (Oral)   Resp 20   Ht 4' 9\" (1.448 m)   Wt 141 lb 15.6 oz (64.4 kg)   SpO2 94%   BMI 30.72 kg/m²   Temp (24hrs), Av.9 °F (36.6 °C), Min:97.4 °F (36.3 °C), Max:98.6 °F (37 °C)     No results for input(s): POCGLU in the last 72 hours.     Intake/Output Summary (Last 24 hours) at 2021 0840  Last data filed at 2021 0600      Gross per 24 hour   Intake 710 ml   Output 1250 ml   Net -540 ml            Neurologic Exam      Mental Status   Oriented to person, but not to place. Oriented to month and year  Level of consciousness: Lethargic.   Opens eyes to verbal stimuli     Cranial Nerves   Cranial nerves II through XII intact.      Motor Exam   Muscle bulk: normal (Strength 4+ in RU and RL extremities, 2/5 in LUE LLE )  Overall muscle tone: normalDysarthria present       Sensory Exam   Light touch normal.   Vibration normal.   Proprioception normal.   Pinprick normal.      Gait, Coordination, and Reflexes      Coordination   Finger to nose coordination: normal  Heel to shin coordination: normal     Tremor   Resting tremor: absent     Reflexes   Right brachioradialis: 2+  Left brachioradialis: 2+  Right biceps: 2+  Left biceps: 2+  Right triceps: 2+  Left triceps: 2+  Right patellar: 2+  Left patellar: 2+  Right achilles: 2+  Left achilles: 2+  Right : 2+  Left : 2+           Investigations:       Laboratory Testing:  Recent Results         Recent Results (from the past 24 hour(s))   CBC WITH AUTO DIFFERENTIAL     Collection Time: 07/22/21  6:52 AM   Result Value Ref Range     WBC 11.5 (H) 3.5 - 11.3 k/uL     RBC 4.66 3.95 - 5.11 m/uL     Hemoglobin 13.9 11.9 - 15.1 g/dL     Hematocrit 42.6 36.3 - 47.1 %     MCV 91.4 82.6 - 102.9 fL     MCH 29.8 25.2 - 33.5 pg     MCHC 32.6 28.4 - 34.8 g/dL     RDW 13.2 11.8 - 14.4 %     Platelets 967 814 - 144 k/uL     MPV 9.7 8.1 - 13.5 fL     NRBC Automated 0.0 0.0 per 100 WBC     Differential Type NOT REPORTED       Seg Neutrophils 70 (H) 36 - 65 %     Lymphocytes 18 (L) 24 - 43 %     Monocytes 10 3 - 12 %     Eosinophils % 1 1 - 4 %     Basophils 0 0 - 2 %     Immature Granulocytes 1 (H) 0 %     Segs Absolute 8.17 (H) 1.50 - 8.10 k/uL     Absolute Lymph # 2.10 1.10 - 3.70 k/uL     Absolute Mono # 1.10 0.10 - 1.20 k/uL     Absolute Eos # 0.07 0.00 - 0.44 k/uL     Basophils Absolute <0.03 0.00 - 0.20 k/uL     Absolute Immature Granulocyte 0.07 0.00 - 0.30 k/uL     WBC Morphology NOT REPORTED       RBC Morphology NOT REPORTED       Platelet Estimate NOT REPORTED     BASIC METABOLIC PANEL     Collection Time: 07/22/21  6:52 AM   Result Value Ref Range     Glucose 152 (H) 70 - 99 mg/dL     BUN 33 (H) 8 - 23 mg/dL     CREATININE 0.54 0.50 - 0.90 mg/dL     Bun/Cre Ratio NOT REPORTED 9 - 20     Calcium 8.6 8.6 - 10.4 mg/dL     Sodium 133 (L) 135 - 144 mmol/L     Potassium 3.6 (L) 3.7 - 5.3 mmol/L     Chloride 96 (L) 98 - 107 mmol/L     CO2 21 20 - 31 mmol/L     Anion Gap 16 9 - 17 mmol/L     GFR Non-African American >60 >60 mL/min     GFR African American >60 >60 mL/min     GFR Comment            GFR Staging NOT REPORTED                 Assessment :       Primary Problem  <principal problem not specified>          Active Hospital Problems     Diagnosis Date Noted    Intracranial hemorrhage ([facility] [address] [I62.9]      Hypertensive emergency [I16.1]      IVH (intraventricular hemorrhage) (HCC) [I61.5]      Acute left hemiparesis (HCC) [G81.94]      Thalamic hemorrhage with stroke ([facility] [address] [I61.9] 07/12/2021         Patient is a 78 y.o. Non- / non  female  PMH of HTN, depression, migraine,  HLD who presented  On 7/12 with left hemiparesis and dysarthria. She was admitted for right thalamic hemorrhage with IVH on CT wo. Last CT head on 7/13 stable bleed. Patient safe for stepdown per neuro critical care team.       7/23: CT head repeated last night due to patients acute change in mental status AOx1 complaints of new right leg sensory loss. CT head stable. This AM, patient's headache improved with Depakote, denies suicidal ideation. Will reconsult [facility]. Medicine on  board managing BP medication. Upon reevaluation with attending, patient appears unusually drowsy compared to baseline. Repeat CT head without contrast on 7/23 showed no significant interval change in the right thalamic hemorrhage with intraventricular extension. 7/24: Moderate confusion. Percocet given for headache. 1. Thalamic hemorrhage with IVH with residual left hemiparesis and dysarthria. 2. AMS  3. Intractable Migraine  4.  Depression with suicidal intent.      Plan:      Patient status Admit as inpatient in the  Progressive Unit/Step down     ICH with residual left sided weakness, dysarthria- new onset AMS on 7/23    - Transferred from [facility]  - CT head: right basal ganglia hemorrhage with intraventricular hemorrhage most likely representing rupture of the basal ganglia hemorrhage into the ventricular system w  - CTA unremarkable   - PT/OT/ST  - CT head on 7/23 2 AM: Stable volume and extent of acute intraparenchymal hemorrhage involving   the mid to posterior right thalamus.   - Repeat CT 7/23 2pm: No significant interval change in right thalamic hemorrhage with interventricular extension  - Ammonia 17 (on 7/23 70) and VBG ordered per neurocritical care.     Intractable Migraine   - Elavil 10 mg po nightly d/c on 7/22   - Mg 400 mg po nightly   - Completed course of Decadron.  - Percocet 5-325 once for headache. (given today)  - 2 g IV Magnesium completed.   - Depakote 1g IV loading, then 250 mg bid thereafter for prophylaxis   - Fiorcicet 1 tab q 6 hrs PRN   - Depakote levels: 5.1 free and total 32.     Essential HTN:  - Management per intermed      Dispo: Awaiting placement for psych/rehab facility  Awaiting psych recommendations.            Consultations:   IP CONSULT TO NEUROCRITICAL CARE  IP CONSULT TO NEUROSURGERY  IP CONSULT TO PHYSICAL MEDICINE REHAB  IP CONSULT TO PSYCHIATRY        Follow-up further recommendations after discussing the case with attending  The plan was discussed with the patient, patient's family and the medical staff.      Patient is admitted as inpatient status because of co-morbidities listed above, severity of signs and symptoms as outlined, requirement for current medical therapies and most importantly because of direct risk to patient if care not provided in a hospital setting.  Kandi Barrera MD  7/22/2021  8:40 AM     Copy sent to CHRISTIANNE Pillai - CNP

## 2021-07-24 NOTE — PROGRESS NOTES
Perfect serve sent to Dr. Felipe Hoover regarding patient's ongoing headache even with medications given, will monitor.

## 2021-07-25 LAB
ANION GAP SERPL CALCULATED.3IONS-SCNC: 13 MMOL/L (ref 9–17)
BUN BLDV-MCNC: 19 MG/DL (ref 8–23)
BUN/CREAT BLD: ABNORMAL (ref 9–20)
CALCIUM SERPL-MCNC: 8.9 MG/DL (ref 8.6–10.4)
CHLORIDE BLD-SCNC: 96 MMOL/L (ref 98–107)
CO2: 26 MMOL/L (ref 20–31)
CREAT SERPL-MCNC: 0.56 MG/DL (ref 0.5–0.9)
GFR AFRICAN AMERICAN: >60 ML/MIN
GFR NON-AFRICAN AMERICAN: >60 ML/MIN
GFR SERPL CREATININE-BSD FRML MDRD: ABNORMAL ML/MIN/{1.73_M2}
GFR SERPL CREATININE-BSD FRML MDRD: ABNORMAL ML/MIN/{1.73_M2}
GLUCOSE BLD-MCNC: 140 MG/DL (ref 70–99)
POTASSIUM SERPL-SCNC: 4.5 MMOL/L (ref 3.7–5.3)
SODIUM BLD-SCNC: 135 MMOL/L (ref 135–144)

## 2021-07-25 PROCEDURE — 6370000000 HC RX 637 (ALT 250 FOR IP): Performed by: FAMILY MEDICINE

## 2021-07-25 PROCEDURE — 51702 INSERT TEMP BLADDER CATH: CPT

## 2021-07-25 PROCEDURE — 6370000000 HC RX 637 (ALT 250 FOR IP): Performed by: STUDENT IN AN ORGANIZED HEALTH CARE EDUCATION/TRAINING PROGRAM

## 2021-07-25 PROCEDURE — 99232 SBSQ HOSP IP/OBS MODERATE 35: CPT | Performed by: STUDENT IN AN ORGANIZED HEALTH CARE EDUCATION/TRAINING PROGRAM

## 2021-07-25 PROCEDURE — 99232 SBSQ HOSP IP/OBS MODERATE 35: CPT | Performed by: PSYCHIATRY & NEUROLOGY

## 2021-07-25 PROCEDURE — 6370000000 HC RX 637 (ALT 250 FOR IP): Performed by: PSYCHIATRY & NEUROLOGY

## 2021-07-25 PROCEDURE — 6370000000 HC RX 637 (ALT 250 FOR IP): Performed by: NURSE PRACTITIONER

## 2021-07-25 PROCEDURE — 6360000002 HC RX W HCPCS: Performed by: NURSE PRACTITIONER

## 2021-07-25 PROCEDURE — U0003 INFECTIOUS AGENT DETECTION BY NUCLEIC ACID (DNA OR RNA); SEVERE ACUTE RESPIRATORY SYNDROME CORONAVIRUS 2 (SARS-COV-2) (CORONAVIRUS DISEASE [COVID-19]), AMPLIFIED PROBE TECHNIQUE, MAKING USE OF HIGH THROUGHPUT TECHNOLOGIES AS DESCRIBED BY CMS-2020-01-R: HCPCS

## 2021-07-25 PROCEDURE — 2580000003 HC RX 258: Performed by: PSYCHIATRY & NEUROLOGY

## 2021-07-25 PROCEDURE — 80048 BASIC METABOLIC PNL TOTAL CA: CPT

## 2021-07-25 PROCEDURE — U0005 INFEC AGEN DETEC AMPLI PROBE: HCPCS

## 2021-07-25 PROCEDURE — 36415 COLL VENOUS BLD VENIPUNCTURE: CPT

## 2021-07-25 PROCEDURE — 2060000000 HC ICU INTERMEDIATE R&B

## 2021-07-25 RX ORDER — CARVEDILOL 6.25 MG/1
6.25 TABLET ORAL 2 TIMES DAILY WITH MEALS
Status: DISCONTINUED | OUTPATIENT
Start: 2021-07-25 | End: 2021-07-25

## 2021-07-25 RX ORDER — DIVALPROEX SODIUM 500 MG/1
500 TABLET, DELAYED RELEASE ORAL EVERY 12 HOURS SCHEDULED
Status: DISCONTINUED | OUTPATIENT
Start: 2021-07-25 | End: 2021-07-26 | Stop reason: HOSPADM

## 2021-07-25 RX ORDER — SPIRONOLACTONE 25 MG/1
50 TABLET ORAL ONCE
Status: DISCONTINUED | OUTPATIENT
Start: 2021-07-25 | End: 2021-07-25

## 2021-07-25 RX ORDER — SPIRONOLACTONE 100 MG/1
100 TABLET, FILM COATED ORAL DAILY
Status: DISCONTINUED | OUTPATIENT
Start: 2021-07-26 | End: 2021-07-25

## 2021-07-25 RX ADMIN — AMLODIPINE BESYLATE 10 MG: 10 TABLET ORAL at 08:22

## 2021-07-25 RX ADMIN — ENOXAPARIN SODIUM 40 MG: 40 INJECTION SUBCUTANEOUS at 08:22

## 2021-07-25 RX ADMIN — SPIRONOLACTONE 50 MG: 25 TABLET ORAL at 08:22

## 2021-07-25 RX ADMIN — CHOLESTYRAMINE 4 G: 4 POWDER, FOR SUSPENSION ORAL at 08:22

## 2021-07-25 RX ADMIN — GABAPENTIN 300 MG: 300 CAPSULE ORAL at 19:47

## 2021-07-25 RX ADMIN — BUTALBITAL, ACETAMINOPHEN AND CAFFEINE 1 TABLET: 50; 325; 40 TABLET ORAL at 19:47

## 2021-07-25 RX ADMIN — SODIUM CHLORIDE, PRESERVATIVE FREE 5 ML: 5 INJECTION INTRAVENOUS at 08:23

## 2021-07-25 RX ADMIN — SODIUM CHLORIDE, PRESERVATIVE FREE 10 ML: 5 INJECTION INTRAVENOUS at 19:47

## 2021-07-25 RX ADMIN — LOSARTAN POTASSIUM 100 MG: 50 TABLET, FILM COATED ORAL at 08:24

## 2021-07-25 RX ADMIN — BUTALBITAL, ACETAMINOPHEN AND CAFFEINE 1 TABLET: 50; 325; 40 TABLET ORAL at 14:55

## 2021-07-25 RX ADMIN — CHOLESTYRAMINE 4 G: 4 POWDER, FOR SUSPENSION ORAL at 19:47

## 2021-07-25 RX ADMIN — GABAPENTIN 300 MG: 300 CAPSULE ORAL at 08:22

## 2021-07-25 RX ADMIN — DIVALPROEX SODIUM 250 MG: 250 TABLET, DELAYED RELEASE ORAL at 08:22

## 2021-07-25 RX ADMIN — DIVALPROEX SODIUM 500 MG: 500 TABLET, DELAYED RELEASE ORAL at 19:47

## 2021-07-25 RX ADMIN — QUETIAPINE FUMARATE 25 MG: 25 TABLET ORAL at 19:47

## 2021-07-25 RX ADMIN — MAGNESIUM GLUCONATE 500 MG ORAL TABLET 400 MG: 500 TABLET ORAL at 08:22

## 2021-07-25 ASSESSMENT — PAIN SCALES - GENERAL
PAINLEVEL_OUTOF10: 9
PAINLEVEL_OUTOF10: 10

## 2021-07-25 ASSESSMENT — ENCOUNTER SYMPTOMS
GASTROINTESTINAL NEGATIVE: 1
RESPIRATORY NEGATIVE: 1
PHOTOPHOBIA: 1
EYE REDNESS: 0

## 2021-07-25 NOTE — PROGRESS NOTES
Department of Psychiatry  Consult Service  Progress Note     Reason for Consult: Suicidal ideation    SUBJECTIVE:    Patient is very somnolent. Staff tried to wake her multiple times however she is stating that she is tired and not opening her eyes. Much of the history was gathered from staff and the sitter present with her. Staff denies any acute issues at this time. Staff mentions that she has been answering all the orientation questions and she is oriented x4. Reviewed records and the neurology resident documented that patient was denying any suicidal ideations after response to Depacon. Staff also mentioned that patient will be going to a stepdown facility and not to home.     OBJECTIVE      Medications  Current Facility-Administered Medications: [START ON 7/26/2021] spironolactone (ALDACTONE) tablet 100 mg, 100 mg, Oral, Daily  spironolactone (ALDACTONE) tablet 50 mg, 50 mg, Oral, Once  fluticasone (FLONASE) 50 MCG/ACT nasal spray 1 spray, 1 spray, Each Nostril, Daily  diclofenac sodium (VOLTAREN) 1 % gel 2 g, 2 g, Topical, BID PRN  butalbital-acetaminophen-caffeine (FIORICET, ESGIC) per tablet 1 tablet, 1 tablet, Oral, Q6H PRN  divalproex (DEPAKOTE) DR tablet 250 mg, 250 mg, Oral, 2 times per day  gabapentin (NEURONTIN) capsule 300 mg, 300 mg, Oral, BID  QUEtiapine (SEROQUEL) tablet 25 mg, 25 mg, Oral, Nightly  magnesium oxide (MAG-OX) tablet 400 mg, 400 mg, Oral, Daily  cholestyramine light packet 4 g, 4 g, Oral, BID  diphenoxylate-atropine (LOMOTIL) 2.5-0.025 MG per tablet 1 tablet, 1 tablet, Oral, 4x Daily PRN  amLODIPine (NORVASC) tablet 10 mg, 10 mg, Oral, Daily  enoxaparin (LOVENOX) injection 40 mg, 40 mg, Subcutaneous, Daily  labetalol (NORMODYNE;TRANDATE) injection 10 mg, 10 mg, Intravenous, Q1H PRN  losartan (COZAAR) tablet 100 mg, 100 mg, Oral, Daily **AND** [DISCONTINUED] hydroCHLOROthiazide (HYDRODIURIL) tablet 25 mg, 25 mg, Oral, Daily  sodium chloride flush 0.9 % injection 5-40 mL, 5-40 mL, Intravenous, 2 times per day  sodium chloride flush 0.9 % injection 5-40 mL, 5-40 mL, Intravenous, PRN  0.9 % sodium chloride infusion, 25 mL, Intravenous, PRN  acetaminophen (TYLENOL) tablet 650 mg, 650 mg, Oral, Q4H PRN  ondansetron (ZOFRAN-ODT) disintegrating tablet 4 mg, 4 mg, Oral, Q8H PRN **OR** ondansetron (ZOFRAN) injection 4 mg, 4 mg, Intravenous, Q6H PRN     Physical  /67   Pulse 103   Temp 98.2 °F (36.8 °C) (Axillary)   Resp 20   Ht 4' 9\" (1.448 m)   Wt 141 lb 15.6 oz (64.4 kg)   SpO2 95%   BMI 30.72 kg/m²     Mental Status Examination:    Level of consciousness:  Somnolent  Appearance: hospital attire, lying in bed, fair grooming  Behavior/Motor: Psychomotor agitation  Attitude toward examiner: Indifferent, poor eye contact  Speech: Monotonous  Mood:  Could not assess  Affect: Constricted  Thought processes: Indiscernible  Thought content: Could not assess  Cognition: Staff reports that she is oriented x4  Concentration poor  Insight & Judgment: Limited    ASSESSMENT    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 We will sign off. Please call back with any questions. Electronically signed by Kamlesh Gardner MD on 7/25/2021 at 10:39 AM       Christopher Poole is a 78 y.o. female being evaluated by a Virtual Visit (video visit) encounter to address concerns as mentioned above.   A caregiver was present in the room along with the patient. Pursuant to the emergency declaration under the [facility], [address] [place] [address] authority and the [employer] and [employer] Act, this Virtual Visit was conducted with patient's (and/or legal guardian's) consent, to reduce the patient's risk of exposure to COVID-19 and provide necessary medical care. Services were provided through a video synchronous discussion virtually to substitute for in-person visit by provider. Patient is present at [place] [address] and I am physically present at [facility]    --Alisa Mcgrath MD on 7/25/2021 at 10:42 AM    An electronic signature was used to authenticate this note. **This report has been created using voice recognition software. It may contain minor errors which are inherent in voice recognition technology. **

## 2021-07-25 NOTE — PROGRESS NOTES
[facility]  Office: [address], DO, Byron Gordon, DO, Dejan Denzel, DO, Maryellen Nix Blood, DO, Albino Meyer MD, Stevenson Whitman MD, Millie Ardon MD, Odalis Rivera MD, Chente Burks MD, Patricia Gomez MD, Demetri Topete MD, Vinod Matt, DO, Carmelina Johansen MD, Marimar Downing, DO, Fred Urrutia MD,  Min Pepper DO, Adiel De Oliveira MD, Yady Sandy MD, Melissa Parks MD, Norma Levy MD, Bennett Tamayo MD, Shanna Bliss MD, Hollis Adkins, [facility], [facility], CNP, Hollie Barajas, CNP, Eli Salazar, [facility], Junior Suh, CNP, Janelle Plunkett, CNP, Luis Maguire, CNP, Brijesh Matos, CNP, Efren Lorenzo, CNP, Charity Kiser PA-C, Keisha Rm, [facility], Fariha Lopez, CNP, Gene Bateman, CNP, Ana Garcia, CNP, Jose Daniel Youngblood, CNP, Bobby Laura, CNP, Omayra Guzman, CNP, Johnny Wyatt, [facility], Johana Dwyer, [address] [place] [address]    Progress Note    7/25/2021    11:12 AM    Name:   Christopher Poole  MRN:     [id number]     Acct:      [de-identified]   Room:   [address] [place] [address] Day:  15  Admit Date:  7/12/2021  7:15 PM    PCP:   CHRISTIANNE Martinez CNP  Code Status:  Full Code    Subjective:     C/C: weakness  Interval History Status: imrpoved    Patient is very sleepy this morning, scheduled for psych eval  Patient still complains of pain in the  Neurology treating as migraine  Blood pressure was elevated overnight, 160-170   Now down to 100-110 after morning meds    Brief History: This is an 47-year-old female who was initially transferred to our hospital for management of right thalamic hemorrhage and hypertensive emergency. Internal medicine was consulted for management of her antihypertensive regimen. Medical history significant for obstructive sleep apnea, hypertension, depression, hyperlipidemia, vitamin D deficiency.     Review of Systems:   Review of systems not possible today given patient is very sleepy    Medications: Allergies: Allergies   Allergen Reactions    Clonidine Derivatives     Morphine     Sulfa Antibiotics        Current Meds:   Scheduled Meds:    fluticasone  1 spray Each Nostril Daily    divalproex  250 mg Oral 2 times per day    gabapentin  300 mg Oral BID    QUEtiapine  25 mg Oral Nightly    magnesium oxide  400 mg Oral Daily    cholestyramine light  4 g Oral BID    amLODIPine  10 mg Oral Daily    enoxaparin  40 mg Subcutaneous Daily    losartan  100 mg Oral Daily    sodium chloride flush  5-40 mL Intravenous 2 times per day     Continuous Infusions:    sodium chloride       PRN Meds: diclofenac sodium, butalbital-acetaminophen-caffeine, diphenoxylate-atropine, labetalol, sodium chloride flush, sodium chloride, acetaminophen, ondansetron **OR** ondansetron    Data:     Past Medical History:   has a past medical history of Anemia, unspecified, Basal cell carcinoma, Depression, Disp fx of fifth metatarsal bone of right foot with routine healing, Hyperlipidemia, Hypertension, 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 Social History:   reports that she has never smoked. She has never used smokeless tobacco. She reports current alcohol use. She reports that she does not use drugs.      Family History:   Family History   Problem Relation Age of Onset    Diabetes Mother     Stroke Father     Heart Disease Father        Vitals:  /67   Pulse 96   Temp 98.2 °F (36.8 °C) (Axillary)   Resp 19   Ht 4' 9\" (1.448 m)   Wt 141 lb 15.6 oz (64.4 kg)   SpO2 94%   BMI 30.72 kg/m²   Temp (24hrs), Av.2 °F (36.8 °C), Min:97.8 °F (36.6 °C), Max:98.9 °F (37.2 °C)    No results for input(s): POCGLU in the last 72 hours.    I/O (24Hr): Intake/Output Summary (Last 24 hours) at 7/25/2021 1112  Last data filed at 7/25/2021 0650  Gross per 24 hour   Intake    Output 2075 ml   Net -2075 ml       Labs:  Hematology:  Recent Labs     07/23/21  0008 07/23/21  1123 07/24/21  0520   WBC 14.2* 12.1* 10.2   RBC 4.94 4.89 4.91   HGB 14.6 14.7 14.7   HCT 45.6 43.8 44.3   MCV 92.3 89.6 90.2   MCH 29.6 30.1 29.9   MCHC 32.0 33.6 33.2   RDW 13.0 12.9 13.0    360 339   MPV 10.1 9.6 9.6     Chemistry:  Recent Labs     07/23/21  0008 07/23/21  1123 07/25/21  0409   * 132* 135   K 3.7 3.4* 4.5   CL 97* 95* 96*   CO2 20 26 26   GLUCOSE 149* 156* 140*   BUN 29* 28* 19   CREATININE 0.48* 0.59 0.56   MG  --  2.3  --    ANIONGAP 14 11 13   LABGLOM >60 >60 >60   GFRAA >60 >60 >60   CALCIUM 8.7 8.7 8.9     Recent Labs     07/23/21  1123 07/24/21  0520   PROT 6.3*  --    LABALBU 3.4*  --    AST 30  --    ALT 54*  --    ALKPHOS 70  --    BILITOT 0.66  --    BILIDIR 0.18  --    AMMONIA 70* 17     ABG:  Lab Results   Component Value Date    POCPH 7.472 07/23/2021    POCPCO2 37.3 07/23/2021    POCPO2 78.5 07/23/2021    POCHCO3 27.3 07/23/2021    NBEA NOT REPORTED 07/23/2021    PBEA 4 07/23/2021    LYQ1YHC NOT REPORTED 07/23/2021    IDOE3GTP 96 07/23/2021    FIO2 32.0 07/23/2021     Lab Results   Component Value Date/Time    SPECIAL R HAND 20ML 07/21/2021 07:55 AM     Lab Results   Component Value Date/Time    CULTURE NO GROWTH 4 DAYS 07/21/2021 07:55 AM       Radiology:  XR CHEST (SINGLE VIEW FRONTAL)    Result Date: 7/20/2021  New mild streaky right infrahilar atelectasis     CT HEAD WO CONTRAST    Result Date: 7/23/2021  No significant interval change in right thalamic hemorrhage with interventricular extension. CT HEAD WO CONTRAST    Result Date: 7/23/2021  1. Stable volume and extent of acute intraparenchymal hemorrhage involving the mid to posterior right thalamus.  2. Unchanged distribution of acute intraventricular hemorrhage within the bilateral lateral ventricles, as discussed above. 3. No new acute intracranial hemorrhage. 4. Severe cerebral white matter chronic microvascular ischemic disease. 5. Posterior left insular white matter 15 mm diameter small remote infarction. CT HEAD WO CONTRAST    Result Date: 7/22/2021  Right thalamic intraparenchymal and intraventricular hemorrhage demonstrate appropriate interval evolution. No evidence of interval hemorrhage. Mild dilatation of the lateral ventricles has not changed significantly since prior study. Fluoroscopy modified barium swallow with video    Result Date: 7/19/2021  Deep laryngeal penetration of thick liquid. No aspiration. Please see separate speech pathology report for full discussion of findings and recommendations. Physical Examination:        General appearance: Drowsy  Mental Status: Drowsy, not able to assess mentation   lungs:  clear to auscultation bilaterally, normal effort  Heart:  regular rate and rhythm, no murmur  Abdomen:  soft, nontender, nondistended, normal bowel sounds, no masses, hepatomegaly, splenomegaly  Extremities:  no edema, redness, tenderness in the calves  Skin: Mole on the right arm   neurological status left lower extremity weakness    Assessment:        Hospital Problems         Last Modified POA    Thalamic hemorrhage with stroke ([address] 7/13/2021 Yes    Intracranial hemorrhage ([address] 7/13/2021 Yes    Hypertensive emergency 7/13/2021 Yes    IVH (intraventricular hemorrhage) ([address] 7/13/2021 Yes    Acute left hemiparesis ([address] 7/13/2021 Yes    Hyponatremia 7/23/2021 Yes    Hypokalemia 7/23/2021 Yes    Frontal sinus pain 7/24/2021 Yes          Plan:        Essential hypertension continue Norvasc, Cozaar. BP dropped after getting the morning meds to 537-666 systolic, will d/c aldactone. Add coreg small dose if bp is elevated during the day, will help with HR as well.   Avoid diuretics given patient has poor oral intake, persistent hyponatremia in the past, continue to check BMP. Encephalopathy secondary to right thalamic hemorrhage with stroke mentation waxing and waning, neurology following, no sepsis suspicion    Migraine-started on depakote by neurology, very drowsy this am    Hyperglycemia A1c of 6.3, continue to monitor blood sugar, stable    Hyponatremia with hypokalemiadiscontinue hydrochlorothiazide, replace potassium to keep K>4, keep mg>2. Hypomagnesemia- on mg supplements    Suicidal ideationpsychiatry reassessment ordered by neuro. seroquel added by neuro.     Obesity recommend weight loss    DVT prophylaxis Lovenox    Pt/ot    Mirian Kramer MD  7/25/2021  11:12 AM

## 2021-07-25 NOTE — ED NOTES
Unable to complete PHQ-9 at this time, social work will continue to follow.       INNA Zhao  07/25/21 2972

## 2021-07-25 NOTE — PLAN OF CARE
Problem: Falls - Risk of:  Goal: Will remain free from falls  Description: Will remain free from falls  7/25/2021 0506 by Lilia Bourne RN  Outcome: Met This Shift     Problem: Falls - Risk of:  Goal: Absence of physical injury  Description: Absence of physical injury  7/25/2021 0506 by Lilia Bourne RN  Outcome: Met This Shift     Problem: HEMODYNAMIC STATUS  Goal: Patient has stable vital signs and fluid balance  7/25/2021 0506 by Lilia Bourne RN  Outcome: Met This Shift     Problem: ACTIVITY INTOLERANCE/IMPAIRED MOBILITY  Goal: Mobility/activity is maintained at optimum level for patient  7/25/2021 0506 by Lilia Bourne RN  Outcome: Met This Shift     Problem: COMMUNICATION IMPAIRMENT  Goal: Ability to express needs and understand communication  7/25/2021 0506 by Lilia Bourne RN  Outcome: Met This Shift   Patient remained free from injury. Patient verbalized understanding of need for the safety precautions. Demonstrates proper use of assistive devices. Bed remains in the lowest position. Call light remains within reach. [facility] in use.

## 2021-07-25 NOTE — CARE COORDINATION
Transitional Planning     Psych re-eval stating patient is safe to discharge to SNF instead of IP psych. PS Dr. Nadia Gómez for regular COVID test for tomorrow. Precert good until tomorrow 7/26.  Transportation set for 1500 (7/26)

## 2021-07-25 NOTE — PLAN OF CARE
Problem: Falls - Risk of:  Goal: Will remain free from falls  Description: Will remain free from falls  7/25/2021 1752 by Dana Ribeiro RN  Outcome: Ongoing  7/25/2021 0506 by Saima Stein RN  Outcome: Met This Shift  Goal: Absence of physical injury  Description: Absence of physical injury  7/25/2021 1752 by Dana Ribeiro RN  Outcome: Ongoing  7/25/2021 0506 by Saima Stein RN  Outcome: Met This Shift     Problem: HEMODYNAMIC STATUS  Goal: Patient has stable vital signs and fluid balance  7/25/2021 1752 by Dana Ribeiro RN  Outcome: Ongoing  7/25/2021 0506 by Saima Stein RN  Outcome: Met This Shift     Problem: ACTIVITY INTOLERANCE/IMPAIRED MOBILITY  Goal: Mobility/activity is maintained at optimum level for patient  7/25/2021 1752 by Dana Ribeiro RN  Outcome: Ongoing  7/25/2021 0506 by Saima Stein RN  Outcome: Met This Shift     Problem: COMMUNICATION IMPAIRMENT  Goal: Ability to express needs and understand communication  7/25/2021 1752 by Dana Ribeiro RN  Outcome: Ongoing  7/25/2021 0506 by Saima Stein RN  Outcome: Met This Shift     Problem: Coping:  Goal: Ability to cope will improve  Description: Ability to cope will improve  Outcome: Ongoing  Goal: Ability to identify appropriate support needs will improve  Description: Ability to identify appropriate support needs will improve  Outcome: Ongoing     Problem: Health Behavior:  Goal: Ability to manage health-related needs will improve  Description: Ability to manage health-related needs will improve  Outcome: Ongoing     Problem: Physical Regulation:  Goal: Signs of adequate cerebral perfusion will increase  Description: Signs of adequate cerebral perfusion will increase  Outcome: Ongoing     Problem: Physical Regulation:  Goal: Signs of adequate cerebral perfusion will increase  Description: Signs of adequate cerebral perfusion will increase  Outcome: Ongoing  Goal: Ability to maintain a stable neurologic state will improve  Description: Ability to maintain a stable neurologic state will improve  Outcome: Ongoing     Problem: Safety:  Goal: Ability to remain free from injury will improve  Description: Ability to remain free from injury will improve  Outcome: Ongoing     Problem: Self-Concept:  Goal: Level of anxiety will decrease  Description: Level of anxiety will decrease  Outcome: Ongoing  Goal: Ability to verbalize feelings about condition will improve  Description: Ability to verbalize feelings about condition will improve  Outcome: Ongoing     Problem: Pain:  Goal: Pain level will decrease  Description: Pain level will decrease  Outcome: Ongoing  Goal: Control of acute pain  Description: Control of acute pain  Outcome: Ongoing  Goal: Control of chronic pain  Description: Control of chronic pain  Outcome: Ongoing     Problem: Nutrition  Goal: Optimal nutrition therapy  Outcome: Ongoing     Problem: Skin Integrity:  Goal: Will show no infection signs and symptoms  Description: Will show no infection signs and symptoms  Outcome: Ongoing  Goal: Absence of new skin breakdown  Description: Absence of new skin breakdown  Outcome: Ongoing     Problem: Suicide risk  Goal: Provide patient with safe environment  Description: Provide patient with safe environment  Outcome: Ongoing

## 2021-07-25 NOTE — DISCHARGE INSTR - COC
Continuity of Care Form    Patient Name: Kirti Avina   :  1940  MRN:  [id number]    Admit date:  2021  Discharge date:  2021    Code Status Order: Full Code   Advance Directives:      Admitting Physician:  Juan Manule Phillip MD  PCP: Brian Rodriguez, APRN - CNP    Discharging Nurse: Kurt Brandt Unit/Room#: 8211/3500-40  Discharging Unit Phone Number: [phone]    Emergency Contact:   Extended Emergency Contact Information  Primary Emergency Contact: Isabela Chaudhari  Home Phone: [phone]  Relation: Child  Secondary Emergency Contact: [facility]  Mobile Phone: [phone]  Relation: Child  Preferred language: English    Past Surgical History:  Past Surgical History:   Procedure Laterality Date    APPENDECTOMY      CHOLECYSTECTOMY      HERNIA REPAIR      HYSTERECTOMY      TONSILLECTOMY      TUBAL LIGATION         Immunization History:   Immunization History   Administered Date(s) Administered    COVID-19, Moderna, PF, 100mcg/0.5mL 2021    Hepatitis B Adult (Engerix-B) 1995    Pneumococcal Conjugate 13-valent (Nbkrtde76) 2020    Pneumococcal Polysaccharide (Gscpydwpa66) 1995    Td (Adult), 5 Lf Tetanus Toxoid, Pf (Tenivac, Decavac) 2009       Active Problems:  Patient Active Problem List   Diagnosis Code    Personal history of other malignant neoplasm of skin Z85.828    Neoplasm of uncertain behavior of skin D48.5    Essential hypertension I10    Osteoarthritis M19.90    Chronic diarrhea K52.9    Thalamic hemorrhage with stroke ([address] I61.9    Intracranial hemorrhage ([address] I62.9    Hypertensive emergency I16.1    IVH (intraventricular hemorrhage) (HCC) I61.5    Acute left hemiparesis (HCC) G81.94    Hyponatremia E87.1    Hypokalemia E87.6    Frontal sinus pain J34.89       Isolation/Infection:   Isolation            No Isolation          Patient Infection Status       None to display            Nurse Assessment:  Last Vital Signs: /76   Pulse 96   Temp 97.4 °F (36.3 °C) (Axillary)   Resp 21   Ht 4' 9\" (1.448 m)   Wt 141 lb 15.6 oz (64.4 kg)   SpO2 95%   BMI 30.72 kg/m²     Last documented pain score (0-10 scale): Pain Level: 10  Last Weight:   Wt Readings from Last 1 Encounters:   07/15/21 141 lb 15.6 oz (64.4 kg)     Mental Status:  oriented and alert    IV Access:  - None    Nursing Mobility/ADLs:  Walking   Dependent  Transfer  Dependent  Bathing  Dependent  Dressing  Dependent  Toileting  Dependent  Feeding  Assisted  Med Admin  Dependent  Med Delivery   whole    Wound Care Documentation and Therapy:        Elimination:  Continence:   · Bowel: No  · Bladder: No  Urinary Catheter: Insertion Date: 7/20/2021   Colostomy/Ileostomy/Ileal Conduit: No       Date of Last BM: 7/26/2021    Intake/Output Summary (Last 24 hours) at 7/25/2021 1559  Last data filed at 7/25/2021 0650  Gross per 24 hour   Intake    Output 1575 ml   Net -1575 ml     I/O last 3 completed shifts:  In: -   Out: [address] [place] [address] [address] [Urine:1575]    Safety Concerns: At Risk for Falls    Impairments/Disabilities:      Vision, 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   Ventilator:    - No ventilator support    Rehab Therapies: Physical Therapy and Occupational Therapy  Weight Bearing Status/Restrictions: Non-weight bearing on left leg  Other Medical Equipment (for information only, NOT a DME order):  hospital bed  Other Treatments: ***    Patient's personal belongings (please select all that are sent with patient):  Glasses    RN SIGNATURE:  Electronically signed by Keron Kate on 7/26/21 at 10:58 AM EDT    CASE MANAGEMENT/SOCIAL WORK SECTION    Inpatient Status Date: 7/12/2021    Readmission Risk Assessment Score:  Readmission Risk              Risk of Unplanned Readmission:  17           Discharging to Facility/ Agency   Name:   [facility] Details  FAX            [id number] [facility], [address] [place] [address]       Phone: [phone]        ·   · Address:  · Phone:  · Fax:    Dialysis Facility (if applicable)   · Name:  · Address:  · Dialysis Schedule:  · Phone:  · Fax:    / signature: Electronically signed by Leonel Cr RN on 7/26/21 at 8:31 AM EDT    PHYSICIAN SECTION    Prognosis: Good    Condition at Discharge: Stable    Rehab Potential (if transferring to Rehab): Good    Recommended Labs or Other Treatments After Discharge: none    Physician Certification: I certify the above information and transfer of Dawson Degroot  is necessary for the continuing treatment of the diagnosis listed and that she requires [place] for less 30 days.      Update Admission H&P: No change in H&P    PHYSICIAN SIGNATURE:  Electronically signed by Ritu Bardales MD on 7/25/21 at 3:59 PM EDT

## 2021-07-25 NOTE — PROGRESS NOTES
Regular Covid 19 swab taken from right nare, labeled, placed in red dot bag, and handed off to second healthcare worker outside of room for transport to laboratory per hospital policy and procedure. Patient tolerated procedure fairly well.

## 2021-07-25 NOTE — PROGRESS NOTES
NEUROLOGY INPATIENT PROGRESS NOTE    7/25/2021         Briefly, 78-year-old female with medical history of migraines, HTN, HLD, presented with left hemiparesis and has been managed for right thalamic hemorrhage with intraventricular extension. She has been complaining of intractable headache. She was having suicidal ideation due to the headache. Headache did not respond to IV magnesium, Benadryl, Fioricet, or Decadron. She was started on Depacon with good response. Next day, she reports no suicidal ideations. Subjective: C/o severe right occipital headache, with photophobia. Denies suicidal ideation. Psych consult is pending. No current facility-administered medications on file prior to encounter. Current Outpatient Medications on File Prior to Encounter   Medication Sig Dispense Refill    cholestyramine (QUESTRAN) 4 GM/DOSE powder TAKE ONE SCOOP DISSOLVED IN A GLASS OF WATER TWICE A DAY AS NEEDED. 378 g 1    NONFORMULARY Indications: Natural supplement to help with elevated BP STRICTION BP - 2 TABLETS 2 TIMES A DAY      Aspirin Buf,CaCarb-MgCarb-MgO, (BUFFERIN PO) Take 2 tablets by mouth 4 times daily as needed Per pt      Melatonin 10 MG TABS Take by mouth nightly as needed      Omega-3 Fatty Acids (FISH OIL PO)       losartan-hydroCHLOROthiazide (HYZAAR) 100-25 MG per tablet TAKE ONE TABLET DAILY. 90 tablet 1    amLODIPine (NORVASC) 5 MG tablet TAKE ONE TABLET DAILY.  90 tablet 1    UNABLE TO FIND CBD Oil per pt report      CALCIUM-MAGNESIUM-VITAMIN D PO       Potassium Gluconate 595 (99 K) MG TABS Take by mouth daily      Flaxseed, Linseed, (FLAXSEED OIL PO)       aspirin-acetaminophen-caffeine (EXCEDRIN MIGRAINE) 250-250-65 MG per tablet Take 2 tablets by mouth daily as needed for Headaches      Magnesium Oxide 250 MG TABS Take 1 tablet by mouth daily      ferrous sulfate 324 (65 Fe) MG EC tablet Take 324 mg by mouth daily (with breakfast)         Allergies: Sarah Rae is allergic to clonidine derivatives, morphine, and sulfa antibiotics. 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 with unspecified severity (HCC)     Nondisp fx of fourth metatarsal bone of right foot with routine heal     Obstructive sleep apnea     Osteoarthritis     Pain in right foot     Spinal stenosis     Surgical aftercare, neoplasm     Vitamin D deficiency        Past Surgical History:   Procedure Laterality Date    APPENDECTOMY      CHOLECYSTECTOMY      HERNIA REPAIR      HYSTERECTOMY      TONSILLECTOMY      TUBAL LIGATION         Medications:     fluticasone  1 spray Each Nostril Daily    spironolactone  50 mg Oral Daily    divalproex  250 mg Oral 2 times per day    gabapentin  300 mg Oral BID    QUEtiapine  25 mg Oral Nightly    magnesium oxide  400 mg Oral Daily    cholestyramine light  4 g Oral BID    amLODIPine  10 mg Oral Daily    enoxaparin  40 mg Subcutaneous Daily    losartan  100 mg Oral Daily    sodium chloride flush  5-40 mL Intravenous 2 times per day     PRN Meds include: diclofenac sodium, butalbital-acetaminophen-caffeine, diphenoxylate-atropine, labetalol, sodium chloride flush, sodium chloride, acetaminophen, ondansetron **OR** ondansetron    Objective:   BP (!) 166/90   Pulse 98   Temp 98.2 °F (36.8 °C) (Axillary)   Resp 20   Ht 4' 9\" (1.448 m)   Wt 141 lb 15.6 oz (64.4 kg)   SpO2 95%   BMI 30.72 kg/m²     Blood pressure range: Systolic (28PQX), BFY:623 , Min:121 , XZZ:688   ; Diastolic (78ZZN), MJM:64, Min:79, Max:103      ROS:  Review of Systems   Constitutional: Negative. HENT: Negative. Eyes: Positive for photophobia. Negative for redness and visual disturbance. Respiratory: Negative. Cardiovascular: Negative. Gastrointestinal: Negative. Musculoskeletal: Negative. Skin: Negative. Neurological: Positive for facial asymmetry, weakness and headaches. Negative for dizziness, tremors, seizures, syncope, speech difficulty, light-headedness and numbness. Psychiatric/Behavioral: Negative for agitation, confusion, decreased concentration and dysphoric mood. NEUROLOGIC EXAMINATION  Physical Exam  Constitutional:       Appearance: Normal appearance. HENT:      Head: Normocephalic and atraumatic. Eyes:      Extraocular Movements: Extraocular movements intact. Pupils: Pupils are equal, round, and reactive to light. Cardiovascular:      Rate and Rhythm: Normal rate and regular rhythm. Pulmonary:      Effort: Pulmonary effort is normal.      Breath sounds: Normal breath sounds. Abdominal:      General: Abdomen is flat. Palpations: Abdomen is soft. Musculoskeletal:         General: Normal range of motion. Cervical back: Normal range of motion and neck supple. Skin:     General: Skin is warm and dry. Neurological:      Mental Status: She is alert and oriented to person, place, and time. Deep Tendon Reflexes:      Reflex Scores:       Bicep reflexes are 1+ on the right side and 1+ on the left side. Patellar reflexes are 1+ on the right side and 1+ on the left side. .  Neurologic Exam     Mental Status   Oriented to person, place, and time. Cranial Nerves     CN III, IV, VI   Pupils are equal, round, and reactive to light.     CN VII   Left facial weakness: central    Motor Exam     Strength   Right deltoid: 4/5  Left deltoid: 1/5  Right biceps: 4/5  Left biceps: 1/5  Right triceps: 4/5  Left triceps: 1/5  Right anterior tibial: 4/5  Left anterior tibial: 1/5  Right posterior tibial: 4/5  Left posterior tibial: 1/5  Right peroneal: 4/5  Left peroneal: 1/5    Sensory Exam   Light touch normal.   Pinprick normal.     Gait, Coordination, and Reflexes     Reflexes   Right biceps: 1+  Left biceps: 1+  Right patellar: 1+  Left patellar: 1+  Right plantar: normal  Left plantar: upgoing       Lab Results:   CBC:   Recent Labs     07/23/21  0008 07/23/21  1123 07/24/21  0520   WBC 14.2* 12.1* 10.2   HGB 14.6 14.7 14.7    360 339     BMP:    Recent Labs     07/23/21  0008 07/23/21  1123 07/25/21  0409   * 132* 135   K 3.7 3.4* 4.5   CL 97* 95* 96*   CO2 20 26 26   BUN 29* 28* 19   CREATININE 0.48* 0.59 0.56   GLUCOSE 149* 156* 140*         Lab Results   Component Value Date    CHOL 144 07/13/2021    LDLCHOLESTEROL 56 07/13/2021    HDL 49 07/13/2021    TRIG 194 (H) 07/13/2021    ALT 54 (H) 07/23/2021    AST 30 07/23/2021    TSH 1.350 02/04/2021    INR 1.0 07/12/2021    LABA1C 6.3 (H) 07/13/2021       Lab Results   Component Value Date    VALPROATE 5.1 (L) 07/23/2021    VALPROATE 32 (L) 07/23/2021       IMAGING  CT head w/o 7/23/2021: No significant interval change in right thalamic hemorrhage with interventricular extension.      ASSESSMENT AND PLAN  · Right thalamic ICH with interventricular extension, due to hypertensive crisis, stable   Repeat CT head w/o on 7/23, showed stable HMG  Continue PT/OT for left hemiparesis     · Migraine without aura, intractable   Headache improved with Depacon   Conitinue Depakote 250 mg Q12H   Severe headache this morning   Continue Fioricet Q6H PRN    Continue magnesium oxide 400 mg daily     · Major depressive disorder   Pending psych consult   Denies suicidal ideations today    · Uncontrolled hypertension   Spironolactone 50 mg daily  Losartan 100 mg daily  Amlodipine 10 mg daily      Seroquel 25 mg HS for insomnia  Lovenox 40 mg sc daily for DVT ppx       MD [facility]- Neurology Resident

## 2021-07-26 VITALS
WEIGHT: 141.98 LBS | RESPIRATION RATE: 17 BRPM | OXYGEN SATURATION: 92 % | SYSTOLIC BLOOD PRESSURE: 120 MMHG | HEIGHT: 57 IN | DIASTOLIC BLOOD PRESSURE: 82 MMHG | HEART RATE: 92 BPM | TEMPERATURE: 98.2 F | BODY MASS INDEX: 30.63 KG/M2

## 2021-07-26 LAB
AMMONIA: 26 UMOL/L (ref 11–51)
CULTURE: NORMAL
Lab: NORMAL
SARS-COV-2: NORMAL
SARS-COV-2: NOT DETECTED
SOURCE: NORMAL
SPECIMEN DESCRIPTION: NORMAL

## 2021-07-26 PROCEDURE — 36415 COLL VENOUS BLD VENIPUNCTURE: CPT

## 2021-07-26 PROCEDURE — 6370000000 HC RX 637 (ALT 250 FOR IP): Performed by: STUDENT IN AN ORGANIZED HEALTH CARE EDUCATION/TRAINING PROGRAM

## 2021-07-26 PROCEDURE — 99232 SBSQ HOSP IP/OBS MODERATE 35: CPT | Performed by: STUDENT IN AN ORGANIZED HEALTH CARE EDUCATION/TRAINING PROGRAM

## 2021-07-26 PROCEDURE — 99306 1ST NF CARE HIGH MDM 50: CPT | Performed by: FAMILY MEDICINE

## 2021-07-26 PROCEDURE — 6370000000 HC RX 637 (ALT 250 FOR IP): Performed by: NURSE PRACTITIONER

## 2021-07-26 PROCEDURE — 6370000000 HC RX 637 (ALT 250 FOR IP): Performed by: FAMILY MEDICINE

## 2021-07-26 PROCEDURE — 99238 HOSP IP/OBS DSCHRG MGMT 30/<: CPT | Performed by: PSYCHIATRY & NEUROLOGY

## 2021-07-26 PROCEDURE — 97129 THER IVNTJ 1ST 15 MIN: CPT

## 2021-07-26 PROCEDURE — 2580000003 HC RX 258: Performed by: PSYCHIATRY & NEUROLOGY

## 2021-07-26 PROCEDURE — 6370000000 HC RX 637 (ALT 250 FOR IP): Performed by: PSYCHIATRY & NEUROLOGY

## 2021-07-26 PROCEDURE — 6360000002 HC RX W HCPCS: Performed by: NURSE PRACTITIONER

## 2021-07-26 PROCEDURE — 82140 ASSAY OF AMMONIA: CPT

## 2021-07-26 RX ORDER — BUTALBITAL, ACETAMINOPHEN AND CAFFEINE 50; 325; 40 MG/1; MG/1; MG/1
1 TABLET ORAL EVERY 6 HOURS PRN
Qty: 180 TABLET | Refills: 3 | Status: SHIPPED | OUTPATIENT
Start: 2021-07-26

## 2021-07-26 RX ORDER — GABAPENTIN 300 MG/1
300 CAPSULE ORAL 2 TIMES DAILY
Qty: 90 CAPSULE | Refills: 3 | Status: SHIPPED | OUTPATIENT
Start: 2021-07-26 | End: 2021-08-25

## 2021-07-26 RX ORDER — DIVALPROEX SODIUM 500 MG/1
500 TABLET, DELAYED RELEASE ORAL EVERY 12 HOURS SCHEDULED
Qty: 90 TABLET | Refills: 3 | Status: SHIPPED | OUTPATIENT
Start: 2021-07-26

## 2021-07-26 RX ORDER — BUTALBITAL, ACETAMINOPHEN AND CAFFEINE 50; 325; 40 MG/1; MG/1; MG/1
1 TABLET ORAL EVERY 6 HOURS PRN
Qty: 180 TABLET | Refills: 3 | Status: SHIPPED | OUTPATIENT
Start: 2021-07-26 | End: 2021-07-26

## 2021-07-26 RX ORDER — QUETIAPINE FUMARATE 25 MG/1
25 TABLET, FILM COATED ORAL NIGHTLY
Qty: 60 TABLET | Refills: 3 | Status: SHIPPED | OUTPATIENT
Start: 2021-07-26

## 2021-07-26 RX ADMIN — CHOLESTYRAMINE 4 G: 4 POWDER, FOR SUSPENSION ORAL at 09:01

## 2021-07-26 RX ADMIN — AMLODIPINE BESYLATE 10 MG: 10 TABLET ORAL at 09:01

## 2021-07-26 RX ADMIN — SODIUM CHLORIDE, PRESERVATIVE FREE 10 ML: 5 INJECTION INTRAVENOUS at 09:02

## 2021-07-26 RX ADMIN — GABAPENTIN 300 MG: 300 CAPSULE ORAL at 09:01

## 2021-07-26 RX ADMIN — LOSARTAN POTASSIUM 100 MG: 50 TABLET, FILM COATED ORAL at 09:01

## 2021-07-26 RX ADMIN — ENOXAPARIN SODIUM 40 MG: 40 INJECTION SUBCUTANEOUS at 09:01

## 2021-07-26 RX ADMIN — BUTALBITAL, ACETAMINOPHEN AND CAFFEINE 1 TABLET: 50; 325; 40 TABLET ORAL at 11:22

## 2021-07-26 RX ADMIN — MAGNESIUM GLUCONATE 500 MG ORAL TABLET 400 MG: 500 TABLET ORAL at 09:01

## 2021-07-26 RX ADMIN — METOPROLOL TARTRATE 25 MG: 25 TABLET ORAL at 11:16

## 2021-07-26 RX ADMIN — DIVALPROEX SODIUM 500 MG: 500 TABLET, DELAYED RELEASE ORAL at 09:01

## 2021-07-26 ASSESSMENT — PAIN SCALES - GENERAL
PAINLEVEL_OUTOF10: 7
PAINLEVEL_OUTOF10: 5

## 2021-07-26 ASSESSMENT — ENCOUNTER SYMPTOMS
PHOTOPHOBIA: 1
GASTROINTESTINAL NEGATIVE: 1
EYE REDNESS: 0
RESPIRATORY NEGATIVE: 1

## 2021-07-26 NOTE — PROGRESS NOTES
NEUROLOGY INPATIENT PROGRESS NOTE    7/26/2021         Briefly, 76-year-old female with medical history of migraines, HTN, HLD, presented with left hemiparesis and has been managed for right thalamic hemorrhage with intraventricular extension. She has been complaining of intractable headache. She was having suicidal ideation due to the headache. Headache did not respond to IV magnesium, Benadryl, Fioricet, or Decadron. She was started on Depacon with good response. Next day, she reports no suicidal ideations. 7/26: Patient was seen and evaluated at the bedside. She was alert and oriented x3. Patient's eyes were closed and she open her eyes on verbal stimuli. Patient had moving her right arm and right leg. He had 1/5 movement in the left arm and 1/5 in left leg. Depakote was increased from 250 mg twice daily to 500 twice daily daily. Psych reevaluated the patient on 7/25. Cleared for discharge to SNF. No current facility-administered medications on file prior to encounter. Current Outpatient Medications on File Prior to Encounter   Medication Sig Dispense Refill    cholestyramine (QUESTRAN) 4 GM/DOSE powder TAKE ONE SCOOP DISSOLVED IN A GLASS OF WATER TWICE A DAY AS NEEDED. 378 g 1    NONFORMULARY Indications: Natural supplement to help with elevated BP STRICTION BP - 2 TABLETS 2 TIMES A DAY      Aspirin Buf,CaCarb-MgCarb-MgO, (BUFFERIN PO) Take 2 tablets by mouth 4 times daily as needed Per pt      Melatonin 10 MG TABS Take by mouth nightly as needed      Omega-3 Fatty Acids (FISH OIL PO)       losartan-hydroCHLOROthiazide (HYZAAR) 100-25 MG per tablet TAKE ONE TABLET DAILY. 90 tablet 1    amLODIPine (NORVASC) 5 MG tablet TAKE ONE TABLET DAILY.  90 tablet 1    UNABLE TO FIND CBD Oil per pt report      CALCIUM-MAGNESIUM-VITAMIN D PO       Potassium Gluconate 595 (99 K) MG TABS Take by mouth daily      Flaxseed, Linseed, (FLAXSEED OIL PO)       aspirin-acetaminophen-caffeine (EXCEDRIN MIGRAINE) 250-250-65 MG per tablet Take 2 tablets by mouth daily as needed for Headaches      Magnesium Oxide 250 MG TABS Take 1 tablet by mouth daily      ferrous sulfate 324 (65 Fe) MG EC tablet Take 324 mg by mouth daily (with breakfast)         Allergies: James Hardin is allergic to clonidine derivatives, morphine, and sulfa antibiotics.     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 with unspecified severity (HCC)     Nondisp fx of fourth metatarsal bone of right foot with routine heal     Obstructive sleep apnea     Osteoarthritis     Pain in right foot     Spinal stenosis     Surgical aftercare, neoplasm     Vitamin D deficiency        Past Surgical History:   Procedure Laterality Date    APPENDECTOMY      CHOLECYSTECTOMY      HERNIA REPAIR      HYSTERECTOMY      TONSILLECTOMY      TUBAL LIGATION         Medications:     divalproex  500 mg Oral 2 times per day    fluticasone  1 spray Each Nostril Daily    gabapentin  300 mg Oral BID    QUEtiapine  25 mg Oral Nightly    magnesium oxide  400 mg Oral Daily    cholestyramine light  4 g Oral BID    amLODIPine  10 mg Oral Daily    enoxaparin  40 mg Subcutaneous Daily    losartan  100 mg Oral Daily    sodium chloride flush  5-40 mL Intravenous 2 times per day     PRN Meds include: diclofenac sodium, butalbital-acetaminophen-caffeine, diphenoxylate-atropine, labetalol, sodium chloride flush, sodium chloride, acetaminophen, ondansetron **OR** ondansetron    Objective:   BP (!) 165/88   Pulse 105   Temp 98.7 °F (37.1 °C) (Axillary)   Resp 21   Ht 4' 9\" (1.448 m)   Wt 141 lb 15.6 oz (64.4 kg)   SpO2 92%   BMI 30.72 kg/m²     Blood pressure range: Systolic (86FRC), PY , Min:106 , SBB:970   ; Diastolic (24hrs), Av, Min:67, Max:106      ROS:  Review of Systems   Constitutional: Negative. HENT: Negative. Eyes: Positive for photophobia. Negative for redness and visual disturbance. Respiratory: Negative. Cardiovascular: Negative. Gastrointestinal: Negative. Musculoskeletal: Negative. Skin: Negative. Neurological: Positive for facial asymmetry, weakness and headaches. Negative for dizziness, tremors, seizures, syncope, speech difficulty, light-headedness and numbness. Psychiatric/Behavioral: Negative for agitation, confusion, decreased concentration and dysphoric mood. NEUROLOGIC EXAMINATION  Physical Exam  Constitutional:       Appearance: Normal appearance. HENT:      Head: Normocephalic and atraumatic. Eyes:      Extraocular Movements: Extraocular movements intact. Pupils: Pupils are equal, round, and reactive to light. Cardiovascular:      Rate and Rhythm: Normal rate and regular rhythm. Pulmonary:      Effort: Pulmonary effort is normal.      Breath sounds: Normal breath sounds. Abdominal:      General: Abdomen is flat. Palpations: Abdomen is soft. Musculoskeletal:         General: Normal range of motion. Cervical back: Normal range of motion and neck supple. Skin:     General: Skin is warm and dry. Neurological:      Mental Status: She is alert and oriented to person, place, and time. Deep Tendon Reflexes:      Reflex Scores:       Bicep reflexes are 1+ on the right side and 1+ on the left side. Patellar reflexes are 1+ on the right side and 1+ on the left side. Psychiatric:         Speech: Speech normal.     .  Neurologic Exam     Mental Status   Oriented to person, place, and time. Attention: normal. Concentration: normal.   Speech: speech is normal     Cranial Nerves     CN III, IV, VI   Pupils are equal, round, and reactive to light.     CN VII   Left facial weakness: central    Motor Exam     Strength   Right deltoid: 4/5  Left Management: Psych re-eval stating patient is safe to discharge to SNF instead of IP psych. PS Dr. Merritt Finch for regular COVID test for tomorrow. Precert good until tomorrow 7/26.  Transportation set for 1500 (7/26)      Solomon Shaw MD  [id number] Resident

## 2021-07-26 NOTE — PROGRESS NOTES
[facility]  Office: [address], DO, Luis Franco, DO, Erum Li, DO, Jessica Savage, DO, Jens Ponce MD, Geetha Parker MD, Lesia Fernandez MD, Leonarda Canada MD, Annita Aldrich MD, Mauro Silver MD, Jennie Gonzalez MD, Elsa Montalvo DO, Caitlin Matson MD, Oscar Orosco DO, Kristal Stroud MD,  Claudio Yates DO, Ruth Ann Macdonald MD, Aline Hewitt MD, Farhan Man MD, Dariela Nice MD, Norm Diamond MD, Jasmyn Spain MD, Maynor Naidu, [facility], [facility], CNP, Santos Serra, CNP, Ron Andrade, CNS, Carmel Glover, CNP, Claude Leep, CNP, Ivon Mcleod, CNP, Johanny Kay, CNP, Buddy Zapata, CNP, Juan Antonio Frazn PA-C, Daniel Harris, [facility], Olivia Berger, CNP, Sandra Ohara, CNP, Mary Vo, CNP, Matt Richardson, CNP, Bren Darling, CNP, Trevor Hutson, CNP, Storm Singh, [facility], Benosn Sunshine, [address] [place] [address]    Progress Note    7/26/2021    9:40 AM    Name:   Antonina Singh  MRN:     [id number]     Acct:      [de-identified]   Room:   [address] [place] [id number] Day:  15  Admit Date:  7/12/2021  7:15 PM    PCP:   CHRISTAINNE Cassidy CNP  Code Status:  Full Code    Subjective:     C/C: weakness  Interval History Status: imrpoved    Patient is more awake this morning, cleared by psych for discharge  Does not complain of any pain  Continues on Seroquel nightly by neurology  Ate her breakfast but this morning  Blood pressure in 130-140  Brief History: This is an 53-year-old female who was initially transferred to our hospital for management of right thalamic hemorrhage and hypertensive emergency. Internal medicine was consulted for management of her antihypertensive regimen. Medical history significant for obstructive sleep apnea, hypertension, depression, hyperlipidemia, vitamin D deficiency. Review of Systems:   Patient does not answer a lot of questions to review systems    Medications:      Allergies: Allergies   Allergen Reactions    Clonidine Derivatives     Morphine     Sulfa Antibiotics        Current Meds:   Scheduled Meds:    divalproex  500 mg Oral 2 times per day    fluticasone  1 spray Each Nostril Daily    gabapentin  300 mg Oral BID    QUEtiapine  25 mg Oral Nightly    magnesium oxide  400 mg Oral Daily    cholestyramine light  4 g Oral BID    amLODIPine  10 mg Oral Daily    enoxaparin  40 mg Subcutaneous Daily    losartan  100 mg Oral Daily    sodium chloride flush  5-40 mL Intravenous 2 times per day     Continuous Infusions:    sodium chloride       PRN Meds: diclofenac sodium, butalbital-acetaminophen-caffeine, diphenoxylate-atropine, labetalol, sodium chloride flush, sodium chloride, acetaminophen, ondansetron **OR** ondansetron    Data:     Past Medical History:   has a past medical history of Anemia, unspecified, Basal cell carcinoma, Depression, Disp fx of fifth metatarsal bone of right foot with routine healing, Hyperlipidemia, Hypertension, 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 Social History:   reports that she has never smoked. She has never used smokeless tobacco. She reports current alcohol use. She reports that she does not use drugs. Family History:   Family History   Problem Relation Age of Onset    Diabetes Mother     Stroke Father     Heart Disease Father        Vitals:  BP (!) 165/88   Pulse 105   Temp 98.7 °F (37.1 °C) (Axillary)   Resp 21   Ht 4' 9\" (1.448 m)   Wt 141 lb 15.6 oz (64.4 kg)   SpO2 92%   BMI 30.72 kg/m²   Temp (24hrs), Av.8 °F (36.6 °C), Min:97.4 °F (36.3 °C), Max:98.7 °F (37.1 °C)    No results for input(s): POCGLU in the last 72 hours. I/O (24Hr):     Intake/Output Summary (Last 24 hours) at 7/26/2021 0940  Last data filed at 7/26/2021 0538  Gross per 24 hour   Intake 200 ml   Output 825 ml   Net -625 ml       Labs:  Hematology:  Recent Labs     07/23/21 1123 07/24/21 0520   WBC 12.1* 10.2   RBC 4.89 4.91   HGB 14.7 14.7   HCT 43.8 44.3   MCV 89.6 90.2   MCH 30.1 29.9   MCHC 33.6 33.2   RDW 12.9 13.0    339   MPV 9.6 9.6     Chemistry:  Recent Labs     07/23/21 1123 07/25/21  0409   * 135   K 3.4* 4.5   CL 95* 96*   CO2 26 26   GLUCOSE 156* 140*   BUN 28* 19   CREATININE 0.59 0.56   MG 2.3  --    ANIONGAP 11 13   LABGLOM >60 >60   GFRAA >60 >60   CALCIUM 8.7 8.9     Recent Labs     07/23/21 1123 07/24/21 0520 07/26/21  0415   PROT 6.3*  --   --    LABALBU 3.4*  --   --    AST 30  --   --    ALT 54*  --   --    ALKPHOS 70  --   --    BILITOT 0.66  --   --    BILIDIR 0.18  --   --    AMMONIA 70* 17 26     ABG:  Lab Results   Component Value Date    POCPH 7.472 07/23/2021    POCPCO2 37.3 07/23/2021    POCPO2 78.5 07/23/2021    POCHCO3 27.3 07/23/2021    NBEA NOT REPORTED 07/23/2021    PBEA 4 07/23/2021    ZAA4OZE NOT REPORTED 07/23/2021    RVVL6QAX 96 07/23/2021    FIO2 32.0 07/23/2021     Lab Results   Component Value Date/Time    SPECIAL R HAND 20ML 07/21/2021 07:55 AM     Lab Results   Component Value Date/Time    CULTURE NO GROWTH 5 DAYS 07/21/2021 07:55 AM       Radiology:  XR CHEST (SINGLE VIEW FRONTAL)    Result Date: 7/20/2021  New mild streaky right infrahilar atelectasis     CT HEAD WO CONTRAST    Result Date: 7/23/2021  No significant interval change in right thalamic hemorrhage with interventricular extension. CT HEAD WO CONTRAST    Result Date: 7/23/2021  1. Stable volume and extent of acute intraparenchymal hemorrhage involving the mid to posterior right thalamus. 2. Unchanged distribution of acute intraventricular hemorrhage within the bilateral lateral ventricles, as discussed above. 3. No new acute intracranial hemorrhage.  4. Severe cerebral white matter chronic microvascular ischemic disease. 5. Posterior left insular white matter 15 mm diameter small remote infarction. CT HEAD WO CONTRAST    Result Date: 7/22/2021  Right thalamic intraparenchymal and intraventricular hemorrhage demonstrate appropriate interval evolution. No evidence of interval hemorrhage. Mild dilatation of the lateral ventricles has not changed significantly since prior study. Fluoroscopy modified barium swallow with video    Result Date: 7/19/2021  Deep laryngeal penetration of thick liquid. No aspiration. Please see separate speech pathology report for full discussion of findings and recommendations. Physical Examination:        General appearance: Awake, slightly drowsy  Mental Status: Drowsy, does not answer questions for mentation assessment  lungs:  clear to auscultation bilaterally, normal effort  Heart:  regular rate and rhythm, no murmur  Abdomen:  soft, nontender, nondistended, normal bowel sounds, no masses, hepatomegaly, splenomegaly  Extremities:  no edema, redness, tenderness in the calves  Skin: Mole on the right arm   neurological status left lower extremity weakness    Assessment:        Hospital Problems         Last Modified POA    Thalamic hemorrhage with stroke ([address] 7/13/2021 Yes    Intracranial hemorrhage ([address] 7/13/2021 Yes    Hypertensive emergency 7/13/2021 Yes    IVH (intraventricular hemorrhage) ([address] 7/13/2021 Yes    Acute left hemiparesis ([address] 7/13/2021 Yes    Hyponatremia 7/23/2021 Yes    Hypokalemia 7/23/2021 Yes    Frontal sinus pain 7/24/2021 Yes          Plan:        Essential hypertension continue Norvasc, Cozaar. Start Lopressor 25 mg twice daily.   Avoid diuretics given patient has poor oral intake, persistent hyponatremia in the past,  BMP normal    Encephalopathy secondary to right thalamic hemorrhage with stroke mentation waxing and waning, neurology following, no sepsis suspicion    Migraine-on Depakote    Hyperglycemia A1c of 6.3, continue to monitor blood sugar, stable    Hyponatremia with hypokalemiadiscontinue hydrochlorothiazide, replace potassium to keep K>4, keep mg>2. Hypomagnesemia- on mg supplements    Suicidal ideationpsychiatry reassessment ordered by neuro. seroquel added by will Mendiola on the rt arm- follow up with dermatology outpatient    DC magnesium oxide on d/c  Okay to discharge if BP is in 807M systolic, FO<378    Obesity recommend weight loss    DVT prophylaxis Lovenox    Pt/ot    Vidal Cornelius MD  7/26/2021  9:40 AM

## 2021-07-26 NOTE — PLAN OF CARE
Problem: Falls - Risk of:  Goal: Will remain free from falls  Description: Will remain free from falls  7/25/2021 1753 by Manjula Tobias RN  Outcome: Completed  7/25/2021 1752 by Manjula Tobias RN  Outcome: Ongoing     Problem: HEMODYNAMIC STATUS  Goal: Patient has stable vital signs and fluid balance  7/25/2021 1753 by Manjula Tobias RN  Outcome: Completed  7/25/2021 1752 by Manjula Tobias RN  Outcome: Ongoing   Patient remained free from injury. Patient verbalized understanding of need for the safety precautions. Demonstrates proper use of assistive devices. Bed remains in the lowest position. Call light remains within reach. [facility] in use.

## 2021-07-26 NOTE — PROGRESS NOTES
Writer attempted to call report to [address] [place] [address]. Writer gave Deputy the unit phone number to call back when RN has available time. Pt discharged to [address] [place] [address] via transport.

## 2021-07-26 NOTE — CARE COORDINATION
--TRansitional Planning    Hemant 76 Cleavon Coca in [facility] 1521 and left message with Dub Mins in admissions, requesting return phone call.    [id number] spoke to patient that plan is for he to go to [facility] in [facility]. She confirms this. Mary Ellen Damian 63 22 called back from [facility]. She can accept patient if she has been without sitter for 24 hours. 1017 Left message with Dub Mins that patient has been without a sitter since yesterday at noon. [address] Y[address] E[address]ada. called and said fax # is [phone], need HENS and covid test. Can accept patient. [id number] Kimani Velasco completed. Called dtr Jennifer Toscano and updated her that the patient is scheduled to be picked up at 3pm for d/c to [facility] in [facility]. She verbalized understanding.     1123 faxed AVS/BARON, Covid test results and fioricet script to [phone]

## 2021-07-26 NOTE — GROUP NOTE
Group Therapy Note    Date: 7/26/2021    Group Start Time: 1000  Group End Time: 1150  Group Topic: Psychotherapy    CARMEN Blanco LSW        Group Therapy Note    patient refused to attend psychotherapy group at 10:00am after encouragement from staff.        Signature:  CARMEN Martino LSW

## 2021-07-26 NOTE — PROGRESS NOTES
Speech Language Pathology  Speech Language Pathology  [facility]    Cognitive Treatment Note    Date: 7/26/2021  Patients Name: Angel Fraser  MRN: [id number]  Diagnosis:   Patient Active Problem List   Diagnosis Code    Personal history of other malignant neoplasm of skin Z85.828    Neoplasm of uncertain behavior of skin D48.5    Essential hypertension I10    Osteoarthritis M19.90    Chronic diarrhea K52.9    Thalamic hemorrhage with stroke ([facility] [address] I61.9    Intracranial hemorrhage ([address] I62.9    Hypertensive emergency I16.1    IVH (intraventricular hemorrhage) ([facility] [address] I61.5    Acute left hemiparesis (HCC) G81.94    Hyponatremia E87.1    Hypokalemia E87.6    Frontal sinus pain J34.89       Pain: 0/10    Cognitive Treatment    Treatment time: 2736-6171      Subjective: [] Alert [] Cooperative     [] Confused     [] Agitated    [x] Lethargic      Objective/Assessment:    Orientation: Orientation info: 6/7 did not increase with max verbal cues or 2 choices; pt. Not oriented to current year. Problem Solving/Reasoning: Antonyms: 1/3 increased to 2/3 with min verbal cues. Similarities/differences: 0/2 increased to 1/2 with max verbal cues. Verbal sequencing and word associations attempted, pt with no response despite max verbal cues and encouragement. Other: Pt. Lethargic, falling asleep throughout session. Max encouragement required for pt to respond to ST questions. Plan:  [x] Continue ST services    [] Discharge from ST:      Discharge recommendations: [] Inpatient Rehab   [] [place]   [] Outpatient Therapy  [] Follow up at trauma clinic   [x] Other:  Further therapy recommended at discharge. Completed by: [id number]  Clinician    Cosigned By: Cheyenne Drake A.CCC/SLP

## 2021-07-26 NOTE — PROGRESS NOTES
Dr. Ivonne Waddell with psychiatry visited with patient via [address], who said one to one suicide sitter is ok to be discontinued. Writer spoke with Dr. Christine Torres with [facility], who discontinued the order.

## 2021-07-26 NOTE — PROGRESS NOTES
Writer attempted to call report to [address] [place] [address]. RN busy with pt care. Will attempt to call back later as time allows.

## 2021-07-26 NOTE — PROGRESS NOTES
[facility]  Occupational Therapy Not Seen Note    DATE: 2021    NAME: Smuit Lutz  MRN: [id number]   : 1940      Patient not seen this date for Occupational Therapy due to:    Pt being discharged soon per RN report    Next Scheduled Treatment: pt is being discharged    Electronically signed by ROSANGELA Whittington on 2021 at 1:33 PM

## 2021-07-27 ENCOUNTER — OUTSIDE SERVICES (OUTPATIENT)
Dept: PRIMARY CARE CLINIC | Age: 81
End: 2021-07-27
Payer: MEDICARE

## 2021-07-27 DIAGNOSIS — I62.9 INTRACRANIAL HEMORRHAGE (HCC): ICD-10-CM

## 2021-07-27 DIAGNOSIS — I10 ESSENTIAL HYPERTENSION: ICD-10-CM

## 2021-07-27 DIAGNOSIS — R51.9 ACUTE NONINTRACTABLE HEADACHE, UNSPECIFIED HEADACHE TYPE: ICD-10-CM

## 2021-07-27 DIAGNOSIS — F32.1 CURRENT MODERATE EPISODE OF MAJOR DEPRESSIVE DISORDER WITHOUT PRIOR EPISODE (HCC): ICD-10-CM

## 2021-07-27 DIAGNOSIS — G81.94 ACUTE LEFT HEMIPARESIS (HCC): Primary | ICD-10-CM

## 2021-07-27 LAB
CULTURE: NORMAL
CULTURE: NORMAL
Lab: NORMAL
Lab: NORMAL
SPECIMEN DESCRIPTION: NORMAL
SPECIMEN DESCRIPTION: NORMAL

## 2021-07-27 NOTE — DISCHARGE SUMMARY
[address] [place] [address]     Department of Neurology  [facility] [address] SUMMARY        Patient Identification:  Jah Tinajero is a 78 y.o. female. :  1940  MRN: [id number]     Acct: [de-identified]   Admit Date:  2021  Discharge date and time: 2021  4:40 PM   Attending Provider: No att. providers found                                     Admission Diagnoses:   Acute cerebrovascular accident (CVA) ([facility] [address] [I63.9]  Acute cerebrovascular accident (CVA) ([facility] [address] [I63.9]    Discharge Diagnoses: Active Problems:    Thalamic hemorrhage with stroke (HCC)    Intracranial hemorrhage (HCC)    Hypertensive emergency    IVH (intraventricular hemorrhage) (HCC)    Acute left hemiparesis (HCC)    Hyponatremia    Hypokalemia    Frontal sinus pain    Acute nonintractable headache  Resolved Problems:    * No resolved hospital problems. *       Consults:   rehabilitation medicine, internal medicine and psychiatry    Brief Inpatient course:    38-year-old female past medical history of migraine, hypertension, hyperlipidemia presented with left hemiparesis and found to have right thalamic hemorrhage with intraventricular extension. Patient initially presented to [facility] ER on  with sudden onset headache followed by dysarthria and acute left hemiparesis. Patient was transferred to neuro ICU. She has missed 1 dose of antihypertensive prior to presentation with 3 days. 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 CTA showed no vascular pathology. Neuro ICU patient was started on Cardene drip repeat CT scan was stable. Patient continued to have frontal headache and was treated with migraine cocktail (Decadron IV 4 mg q 8 hr total of 3 days and completed on , and was tried on numerous medications Flexuril 5 mg, Mag oxide 250 mg po daily, Excedrin, Benadryl, Dilaudid and,  IV Mag, Morphine, Depo medrol without permanent relief. Elavil was recently added for headaches). Patient became suicidal at 1 point and psychiatry was consulted. Eventually patient headache was controlled with Depakote 500 twice daily and patient was discharge to skilled nursing facility. Plan to follow patient in clinic in 6 weeks. Patient is stable from neurological standpoint to be discharged. Procedures: No procedures performed  Any Hospital Acquired Infections: none    Discharge Functional Status:  stable    Disposition: SNF    Patient Instructions:   Please be compliant with medications especially antihypertensive and migraine prophylaxis. Discharge Medications:  Discharge Medication List as of 7/26/2021 11:06 AM      START taking these medications    Details   divalproex (DEPAKOTE) 500 MG DR tablet Take 1 tablet by mouth every 12 hours, Disp-90 tablet, R-3Normal      gabapentin (NEURONTIN) 300 MG capsule Take 1 capsule by mouth 2 times daily for 30 days. , Disp-90 capsule, R-3Normal      QUEtiapine (SEROQUEL) 25 MG tablet Take 1 tablet by mouth nightly, Disp-60 tablet, R-3Normal      diclofenac sodium (VOLTAREN) 1 % GEL Apply 2 g topically 2 times daily as needed for Pain (neck pain), Topical, 2 TIMES DAILY PRN Starting Mon 7/26/2021, Disp-2 g, R-2, Normal      metoprolol tartrate (LOPRESSOR) 25 MG tablet Take 1 tablet by mouth 2 times daily, Disp-60 tablet, R-3Normal      butalbital-acetaminophen-caffeine (FIORICET, ESGIC) -40 MG per tablet Take 1 tablet by mouth every 6 hours as needed for Headaches or Migraine, Disp-180 tablet, R-3Normal         CONTINUE these medications which have NOT CHANGED    Details   cholestyramine (QUESTRAN) 4 GM/DOSE powder TAKE ONE SCOOP DISSOLVED IN A GLASS OF WATER TWICE A DAY AS NEEDED., Disp-378 g, R-1Normal      Melatonin 10 MG TABS Take by mouth nightly as neededHistorical Med      Omega-3 Fatty Acids (FISH OIL PO) Historical Med      losartan-hydroCHLOROthiazide (HYZAAR) 100-25 MG per tablet TAKE ONE TABLET DAILY. , Disp-90 tablet, R-1Normal      amLODIPine (NORVASC) 5 MG tablet TAKE ONE TABLET DAILY. , Disp-90 tablet, R-1Normal      CALCIUM-MAGNESIUM-VITAMIN D PO Historical Med      Potassium Gluconate 595 (99 K) MG TABS Take by mouth dailyHistorical Med      Flaxseed, Linseed, (FLAXSEED OIL PO) Historical Med      Magnesium Oxide 250 MG TABS Take 1 tablet by mouth dailyHistorical Med      ferrous sulfate 324 (65 Fe) MG EC tablet Take 324 mg by mouth daily (with breakfast)Historical Med         STOP taking these medications       NONFORMULARY Comments:   Reason for Stopping:         Aspirin Buf,CaCarb-MgCarb-MgO, (BUFFERIN PO) Comments:   Reason for Stopping:         UNABLE TO FIND Comments:   Reason for Stopping:         aspirin-acetaminophen-caffeine (EXCEDRIN MIGRAINE) 250-250-65 MG per tablet Comments:   Reason for Stopping:               Activity: no heavy lifting, pushing, pulling with the implant side for 2 months    Restrictions: Driving No, Swimming No, Operating heavy machinery No, Compromising heights No    Diet: Dysphagia II Moist and Minced and Mildly Thick Liquids (Nectar Thick)    Follow-up:    Dunia Montes MD  [address] [place] [address]  [phone]    Schedule an appointment as soon as possible for a visit in 6 weeks  Please follow up with neurosurgery in 6 weeks with repeat CT head.      Christopher Becerril, APRN - CNP  [address] [address]  [phone]    In 3 days      Jeremi Rodarte MD  [place] [address] [address]  [phone]    Schedule an appointment as soon as possible for a visit in 2 weeks  [address] Sabi Moreno MD  [address]  [phone]    In 6 weeks  Post hospital follow up      Follow up labs: none    Follow up imaging: none    Note that over 30 minutes was spent in preparing discharge papers, discussing discharge with patient, medication review, etc.      Peg Sanchez MD,  Department of Neurology  [place], [place]  7/27/2021, 9:04 AM

## 2021-07-28 ENCOUNTER — OUTSIDE SERVICES (OUTPATIENT)
Dept: PRIMARY CARE CLINIC | Age: 81
End: 2021-07-28
Payer: MEDICARE

## 2021-07-28 DIAGNOSIS — R53.81 DEBILITY: ICD-10-CM

## 2021-07-28 DIAGNOSIS — G81.94 ACUTE LEFT HEMIPARESIS (HCC): Primary | ICD-10-CM

## 2021-07-28 DIAGNOSIS — R13.19 OTHER DYSPHAGIA: ICD-10-CM

## 2021-07-28 DIAGNOSIS — I62.9 INTRACRANIAL HEMORRHAGE (HCC): ICD-10-CM

## 2021-07-28 DIAGNOSIS — I10 ESSENTIAL HYPERTENSION: ICD-10-CM

## 2021-07-28 PROCEDURE — 99308 SBSQ NF CARE LOW MDM 20: CPT | Performed by: FAMILY MEDICINE

## 2021-07-28 NOTE — PROGRESS NOTES
Patient:  Reji Espinoza, 1940  I saw this patient on 7/28/2021, at [facility]. She has poor intake  She continues to have headache and lt sided weakness. Reason for Visit:      ICD-10-CM    1. Acute left hemiparesis (HCC)  G81.94    2. Intracranial hemorrhage (HCC)  I62.9    3. Essential hypertension  I10    4. Debility  R53.81    5. Other dysphagia  R13.19        Changes since last visit:    has poor intake, headache  1. Fall:  none  2. Behavioral Change: anxious about headache  3. Pain Control: has headache  4. Mobility: decreased  5. Pressure Sore:  none  Current Outpatient Medications   Medication Sig Dispense Refill    divalproex (DEPAKOTE) 500 MG DR tablet Take 1 tablet by mouth every 12 hours 90 tablet 3    gabapentin (NEURONTIN) 300 MG capsule Take 1 capsule by mouth 2 times daily for 30 days. 90 capsule 3    QUEtiapine (SEROQUEL) 25 MG tablet Take 1 tablet by mouth nightly 60 tablet 3    diclofenac sodium (VOLTAREN) 1 % GEL Apply 2 g topically 2 times daily as needed for Pain (neck pain) 2 g 2    metoprolol tartrate (LOPRESSOR) 25 MG tablet Take 1 tablet by mouth 2 times daily 60 tablet 3    butalbital-acetaminophen-caffeine (FIORICET, ESGIC) -40 MG per tablet Take 1 tablet by mouth every 6 hours as needed for Headaches or Migraine 180 tablet 3    cholestyramine (QUESTRAN) 4 GM/DOSE powder TAKE ONE SCOOP DISSOLVED IN A GLASS OF WATER TWICE A DAY AS NEEDED. 378 g 1    Melatonin 10 MG TABS Take by mouth nightly as needed      Omega-3 Fatty Acids (FISH OIL PO)       losartan-hydroCHLOROthiazide (HYZAAR) 100-25 MG per tablet TAKE ONE TABLET DAILY. 90 tablet 1    amLODIPine (NORVASC) 5 MG tablet TAKE ONE TABLET DAILY.  90 tablet 1    CALCIUM-MAGNESIUM-VITAMIN D PO       Potassium Gluconate 595 (99 K) MG TABS Take by mouth daily      Flaxseed, Linseed, (FLAXSEED OIL PO)       Magnesium Oxide 250 MG TABS Take 1 tablet by mouth daily      ferrous sulfate 324 (65 Fe) MG EC tablet Take 324 mg by mouth daily (with breakfast)       No current facility-administered medications for this visit. 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 with unspecified severity (HCC)     Nondisp fx of fourth metatarsal bone of right foot with routine heal     Obstructive sleep apnea     Osteoarthritis     Pain in right foot     Spinal stenosis     Surgical aftercare, neoplasm     Vitamin D deficiency        Past Surgical History:    Past Surgical History:   Procedure Laterality Date    APPENDECTOMY      CHOLECYSTECTOMY      HERNIA REPAIR      HYSTERECTOMY      TONSILLECTOMY      TUBAL LIGATION         Social History:   Social History     Tobacco Use    Smoking status: Never Smoker    Smokeless tobacco: Never Used   Substance Use Topics    Alcohol use: Yes       Family History:   Family History   Problem Relation Age of Onset    Diabetes Mother     Stroke Father     Heart Disease Father            Review of Systems:  Constitutional: negative for fevers or chills  Eyes: negative for visual disturbance   ENT: negative for sore throat or nasal congestion,positive for  dysphagia  Respiratory: neg for shortness of breath , cough  Cardiovascular: neg for chest pain , palpitations,pnd,neg for leg edema  Gastrointestinal: neg for abd pain, nausea, vomiting, diarrhea or constipation,no karina,no blood in stool  Genitourinary: negative for dysuria, urgency,hematuria or frequency  Integument/breast: negative for skin rash or lesions  Neurological: positive for unilateral lt sided weakness.   Muscular Skeletal: no joint pain   Psych - mood stable    Objective: Vitals:   BP-150/74,Pulse-76,Respi-18,Temp-97.5  -----------------------------------------------------------------  Exam:  GEN:   awake, no apparent distress  EYES: No gross abnormalities. ENT:ENT exam normal, no neck nodes or sinus tenderness  NECK: normal, supple, no lymphadenopathy,  no carotid bruits  PULM: clear to auscultation bilaterally- no wheezes, rales or rhonchi, normal air movement, no respiratory distress  COR: regular rate & rhythm, no murmurs and no gallops  ABD:  soft, non-tender, non-distended, normal bowel sounds, no masses or organomegaly  : deferred  EXT:no cyanosis, clubbing or edema present  no calf tenderness, and warm to touch. NEURO: has lt sided weakness  PSYCH: mood stable  SKIN:  No skin lesions or rashes    -----------------------------------------------------------------  Diagnostic Data:   · All diagnostic data was reviewed    Assessment:        ICD-10-CM    1. Acute left hemiparesis (HCC)  G81.94    2. Intracranial hemorrhage (HCC)  I62.9    3. Essential hypertension  I10    4. Debility  R53.81    5.  Other dysphagia  R13.19      Patient Active Problem List   Diagnosis Code    Personal history of other malignant neoplasm of skin Z85.828    Neoplasm of uncertain behavior of skin D48.5    Essential hypertension I10    Osteoarthritis M19.90    Chronic diarrhea K52.9    Thalamic hemorrhage with stroke ([address] I61.9    Intracranial hemorrhage ([address] I62.9    Hypertensive emergency I16.1    IVH (intraventricular hemorrhage) (HCC) I61.5    Acute left hemiparesis (HCC) G81.94    Hyponatremia E87.1    Hypokalemia E87.6    Frontal sinus pain J34.89    Acute nonintractable headache R51.9         Plan:     Pt and ot  Dysphagia- speech therapy   Pain control  Monitor bp  Continue current medications  Prevent pressure sore  Prevent falls  Prognosis is guarded    Electronically signed by Sherry Horn MD on 7/28/2021 at 3:38 PM

## 2021-08-02 ENCOUNTER — OUTSIDE SERVICES (OUTPATIENT)
Dept: PRIMARY CARE CLINIC | Age: 81
End: 2021-08-02
Payer: MEDICARE

## 2021-08-02 DIAGNOSIS — I62.9 INTRACRANIAL HEMORRHAGE (HCC): ICD-10-CM

## 2021-08-02 DIAGNOSIS — I10 ESSENTIAL HYPERTENSION: ICD-10-CM

## 2021-08-02 DIAGNOSIS — R53.81 DEBILITY: ICD-10-CM

## 2021-08-02 DIAGNOSIS — G81.94 ACUTE LEFT HEMIPARESIS (HCC): Primary | ICD-10-CM

## 2021-08-02 PROCEDURE — 99308 SBSQ NF CARE LOW MDM 20: CPT | Performed by: FAMILY MEDICINE

## 2021-08-03 ENCOUNTER — OUTSIDE SERVICES (OUTPATIENT)
Dept: PRIMARY CARE CLINIC | Age: 81
End: 2021-08-03
Payer: MEDICARE

## 2021-08-03 DIAGNOSIS — I62.9 INTRACRANIAL HEMORRHAGE (HCC): ICD-10-CM

## 2021-08-03 DIAGNOSIS — R53.81 DEBILITY: ICD-10-CM

## 2021-08-03 DIAGNOSIS — R13.19 OTHER DYSPHAGIA: ICD-10-CM

## 2021-08-03 DIAGNOSIS — G81.94 ACUTE LEFT HEMIPARESIS (HCC): Primary | ICD-10-CM

## 2021-08-03 DIAGNOSIS — I10 ESSENTIAL HYPERTENSION: ICD-10-CM

## 2021-08-03 NOTE — PROGRESS NOTES
Patient:  Sharath Castaneda, 1940  I saw this patient on 08/04/2021, at [facility]. Continues to decline  Has left shoulder pain  She has poor intake  She has lt sided weakness. Reason for Visit:      ICD-10-CM    1. Acute left hemiparesis (HCC)  G81.94    2. Intracranial hemorrhage (HCC)  I62.9    3. Essential hypertension  I10    4. Debility  R53.81    5. Other dysphagia  R13.19        Changes since last visit:   Continues to decline   has poor intake, lt shoulder pain  1. Fall:  none  2. Behavioral Change: anxious   3. Pain Control: has lt shoulder pain  4. Mobility: decreased  5. Pressure Sore:  none  Current Outpatient Medications   Medication Sig Dispense Refill    divalproex (DEPAKOTE) 500 MG DR tablet Take 1 tablet by mouth every 12 hours 90 tablet 3    gabapentin (NEURONTIN) 300 MG capsule Take 1 capsule by mouth 2 times daily for 30 days. 90 capsule 3    QUEtiapine (SEROQUEL) 25 MG tablet Take 1 tablet by mouth nightly 60 tablet 3    diclofenac sodium (VOLTAREN) 1 % GEL Apply 2 g topically 2 times daily as needed for Pain (neck pain) 2 g 2    metoprolol tartrate (LOPRESSOR) 25 MG tablet Take 1 tablet by mouth 2 times daily 60 tablet 3    butalbital-acetaminophen-caffeine (FIORICET, ESGIC) -40 MG per tablet Take 1 tablet by mouth every 6 hours as needed for Headaches or Migraine 180 tablet 3    cholestyramine (QUESTRAN) 4 GM/DOSE powder TAKE ONE SCOOP DISSOLVED IN A GLASS OF WATER TWICE A DAY AS NEEDED. 378 g 1    Melatonin 10 MG TABS Take by mouth nightly as needed      Omega-3 Fatty Acids (FISH OIL PO)       losartan-hydroCHLOROthiazide (HYZAAR) 100-25 MG per tablet TAKE ONE TABLET DAILY. 90 tablet 1    amLODIPine (NORVASC) 5 MG tablet TAKE ONE TABLET DAILY.  90 tablet 1    CALCIUM-MAGNESIUM-VITAMIN D PO       Potassium Gluconate 595 (99 K) MG TABS Take by mouth daily      Flaxseed, Linseed, (FLAXSEED OIL PO)       Magnesium Oxide 250 MG TABS Take 1 tablet by mouth daily      ferrous sulfate 324 (65 Fe) MG EC tablet Take 324 mg by mouth daily (with breakfast)       No current facility-administered medications for this visit. 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 with unspecified severity (HCC)     Nondisp fx of fourth metatarsal bone of right foot with routine heal     Obstructive sleep apnea     Osteoarthritis     Pain in right foot     Spinal stenosis     Surgical aftercare, neoplasm     Vitamin D deficiency        Past Surgical History:    Past Surgical History:   Procedure Laterality Date    APPENDECTOMY      CHOLECYSTECTOMY      HERNIA REPAIR      HYSTERECTOMY      TONSILLECTOMY      TUBAL LIGATION         Social History:   Social History     Tobacco Use    Smoking status: Never Smoker    Smokeless tobacco: Never Used   Substance Use Topics    Alcohol use: Yes       Family History:   Family History   Problem Relation Age of Onset    Diabetes Mother     Stroke Father     Heart Disease Father            Review of Systems:  Constitutional: negative for fevers or chills  Eyes: negative for visual disturbance   ENT: negative for sore throat or nasal congestion,positive for  dysphagia  Respiratory: neg for shortness of breath , cough  Cardiovascular: neg for chest pain , palpitations,pnd,neg for leg edema  Gastrointestinal: neg for abd pain, nausea, vomiting, diarrhea or constipation,no karina,no blood in stool  Genitourinary: negative for dysuria, urgency,hematuria or frequency  Integument/breast: negative for skin rash or lesions  Neurological: positive for unilateral lt sided weakness.   Muscular Skeletal: no joint pain Psych - mood stable    Objective:    Vitals:   BP-120/70,Pulse-72,Respi-18,Temp-97.5  -----------------------------------------------------------------  Exam:  GEN:   awake, no apparent distress,poorly nurished  EYES: No gross abnormalities. ENT:ENT exam normal, no neck nodes or sinus tenderness  NECK: normal, supple, no lymphadenopathy,  no carotid bruits  PULM: clear to auscultation bilaterally- no wheezes, rales or rhonchi, normal air movement, no respiratory distress  COR: regular rate & rhythm, no murmurs and no gallops  ABD:  soft, non-tender, non-distended, normal bowel sounds, no masses or organomegaly  : deferred  EXT:no cyanosis, clubbing or edema present  no calf tenderness, and warm to touch. NEURO: has lt sided weakness  Lt shoulder movements painful  PSYCH: mood stable  SKIN:  No skin lesions or rashes    -----------------------------------------------------------------  Diagnostic Data:   · All diagnostic data was reviewed    Assessment:        ICD-10-CM    1. Acute left hemiparesis (HCC)  G81.94    2. Intracranial hemorrhage (HCC)  I62.9    3. Essential hypertension  I10    4. Debility  R53.81    5.  Other dysphagia  R13.19      Patient Active Problem List   Diagnosis Code    Personal history of other malignant neoplasm of skin Z85.828    Neoplasm of uncertain behavior of skin D48.5    Essential hypertension I10    Osteoarthritis M19.90    Chronic diarrhea K52.9    Thalamic hemorrhage with stroke ([facility] [address] I61.9    Intracranial hemorrhage ([facility] [address] I62.9    Hypertensive emergency I16.1    IVH (intraventricular hemorrhage) (HCC) I61.5    Acute left hemiparesis (HCC) G81.94    Hyponatremia E87.1    Hypokalemia E87.6    Frontal sinus pain J34.89    Acute nonintractable headache R51.9         Plan:     Discussed with daughter about her poor prognosis   She has decided to refer to hospice services for comfort care  Pain control  Monitor bp  Continue current medications  Prevent pressure sore  Prevent falls  Prognosis is guarded    Electronically signed by Jono Johansen MD on 8/5/2021 at 4:34 PM

## 2021-08-03 NOTE — PROGRESS NOTES
Patient:  Antonina Singh, 1940  I saw this patient on 08/02/2021, at [facility]. Has left shoulder pain  She has poor intake  She has lt sided weakness. Reason for Visit:      ICD-10-CM    1. Acute left hemiparesis (HCC)  G81.94    2. Intracranial hemorrhage (HCC)  I62.9    3. Essential hypertension  I10    4. Debility  R53.81        Changes since last visit:    has poor intake, lt shoulder pain  1. Fall:  none  2. Behavioral Change: anxious   3. Pain Control: has lt shoulder pain  4. Mobility: decreased  5. Pressure Sore:  none  Current Outpatient Medications   Medication Sig Dispense Refill    divalproex (DEPAKOTE) 500 MG DR tablet Take 1 tablet by mouth every 12 hours 90 tablet 3    gabapentin (NEURONTIN) 300 MG capsule Take 1 capsule by mouth 2 times daily for 30 days. 90 capsule 3    QUEtiapine (SEROQUEL) 25 MG tablet Take 1 tablet by mouth nightly 60 tablet 3    diclofenac sodium (VOLTAREN) 1 % GEL Apply 2 g topically 2 times daily as needed for Pain (neck pain) 2 g 2    metoprolol tartrate (LOPRESSOR) 25 MG tablet Take 1 tablet by mouth 2 times daily 60 tablet 3    butalbital-acetaminophen-caffeine (FIORICET, ESGIC) -40 MG per tablet Take 1 tablet by mouth every 6 hours as needed for Headaches or Migraine 180 tablet 3    cholestyramine (QUESTRAN) 4 GM/DOSE powder TAKE ONE SCOOP DISSOLVED IN A GLASS OF WATER TWICE A DAY AS NEEDED. 378 g 1    Melatonin 10 MG TABS Take by mouth nightly as needed      Omega-3 Fatty Acids (FISH OIL PO)       losartan-hydroCHLOROthiazide (HYZAAR) 100-25 MG per tablet TAKE ONE TABLET DAILY. 90 tablet 1    amLODIPine (NORVASC) 5 MG tablet TAKE ONE TABLET DAILY.  90 tablet 1    CALCIUM-MAGNESIUM-VITAMIN D PO       Potassium Gluconate 595 (99 K) MG TABS Take by mouth daily      Flaxseed, Linseed, (FLAXSEED OIL PO)       Magnesium Oxide 250 MG TABS Take 1 tablet by mouth daily      ferrous sulfate 324 (65 Fe) MG EC tablet Take 324 mg by mouth daily (with breakfast)       No current facility-administered medications for this visit. 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 with unspecified severity (HCC)     Nondisp fx of fourth metatarsal bone of right foot with routine heal     Obstructive sleep apnea     Osteoarthritis     Pain in right foot     Spinal stenosis     Surgical aftercare, neoplasm     Vitamin D deficiency        Past Surgical History:    Past Surgical History:   Procedure Laterality Date    APPENDECTOMY      CHOLECYSTECTOMY      HERNIA REPAIR      HYSTERECTOMY      TONSILLECTOMY      TUBAL LIGATION         Social History:   Social History     Tobacco Use    Smoking status: Never Smoker    Smokeless tobacco: Never Used   Substance Use Topics    Alcohol use: Yes       Family History:   Family History   Problem Relation Age of Onset    Diabetes Mother     Stroke Father     Heart Disease Father            Review of Systems:  Constitutional: negative for fevers or chills  Eyes: negative for visual disturbance   ENT: negative for sore throat or nasal congestion,positive for  dysphagia  Respiratory: neg for shortness of breath , cough  Cardiovascular: neg for chest pain , palpitations,pnd,neg for leg edema  Gastrointestinal: neg for abd pain, nausea, vomiting, diarrhea or constipation,no karina,no blood in stool  Genitourinary: negative for dysuria, urgency,hematuria or frequency  Integument/breast: negative for skin rash or lesions  Neurological: positive for unilateral lt sided weakness.   Muscular Skeletal: no joint pain   Psych - mood stable    Objective:    Vitals: BP-134/77,Pulse-57,Respi-18,Temp-97.5  -----------------------------------------------------------------  Exam:  GEN:   awake, no apparent distress  EYES: No gross abnormalities. ENT:ENT exam normal, no neck nodes or sinus tenderness  NECK: normal, supple, no lymphadenopathy,  no carotid bruits  PULM: clear to auscultation bilaterally- no wheezes, rales or rhonchi, normal air movement, no respiratory distress  COR: regular rate & rhythm, no murmurs and no gallops  ABD:  soft, non-tender, non-distended, normal bowel sounds, no masses or organomegaly  : deferred  EXT:no cyanosis, clubbing or edema present  no calf tenderness, and warm to touch. NEURO: has lt sided weakness  Lt shoulder movements painful  PSYCH: mood stable  SKIN:  No skin lesions or rashes    -----------------------------------------------------------------  Diagnostic Data:   · All diagnostic data was reviewed    Assessment:        ICD-10-CM    1. Acute left hemiparesis (HCC)  G81.94    2. Intracranial hemorrhage (HCC)  I62.9    3. Essential hypertension  I10    4.  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.5    Essential hypertension I10    Osteoarthritis M19.90    Chronic diarrhea K52.9    Thalamic hemorrhage with stroke ([facility] [address] I61.9    Intracranial hemorrhage (HCC) I62.9    Hypertensive emergency I16.1    IVH (intraventricular hemorrhage) (HCC) I61.5    Acute left hemiparesis (HCC) G81.94    Hyponatremia E87.1    Hypokalemia E87.6    Frontal sinus pain J34.89    Acute nonintractable headache R51.9         Plan:     Pt and ot  Xray of lt shoulder  Dysphagia- speech therapy   Pain control  Monitor bp  Continue current medications  Prevent pressure sore  Prevent falls  Prognosis is guarded    Electronically signed by Fatuma Lo MD on 8/3/2021 at 8:00 AM

## 2021-08-04 PROCEDURE — 99308 SBSQ NF CARE LOW MDM 20: CPT | Performed by: FAMILY MEDICINE

## 2024-05-14 NOTE — PROGRESS NOTES
Report called to [facility] at [address] [place] [address]. All questions/concerns answered. no